# Patient Record
Sex: MALE | Race: WHITE | NOT HISPANIC OR LATINO | Employment: OTHER | URBAN - NONMETROPOLITAN AREA
[De-identification: names, ages, dates, MRNs, and addresses within clinical notes are randomized per-mention and may not be internally consistent; named-entity substitution may affect disease eponyms.]

---

## 2017-01-03 ENCOUNTER — DOCUMENTATION (OUTPATIENT)
Dept: NEUROSURGERY | Facility: CLINIC | Age: 47
End: 2017-01-03

## 2017-01-03 NOTE — PROGRESS NOTES
Mailed office note to Evelin Leung APRN  [address] [facility]  [id number]

## 2017-05-26 ENCOUNTER — OFFICE VISIT (OUTPATIENT)
Dept: NEUROLOGY | Age: 47
End: 2017-05-26
Payer: MEDICAID

## 2017-05-26 VITALS
DIASTOLIC BLOOD PRESSURE: 78 MMHG | OXYGEN SATURATION: 94 % | SYSTOLIC BLOOD PRESSURE: 133 MMHG | BODY MASS INDEX: 42.66 KG/M2 | WEIGHT: 315 LBS | HEIGHT: 72 IN | HEART RATE: 81 BPM

## 2017-05-26 DIAGNOSIS — G47.33 OBSTRUCTIVE SLEEP APNEA: Primary | ICD-10-CM

## 2017-05-26 DIAGNOSIS — Z99.89 CPAP (CONTINUOUS POSITIVE AIRWAY PRESSURE) DEPENDENCE: ICD-10-CM

## 2017-05-26 PROCEDURE — 99213 OFFICE O/P EST LOW 20 MIN: CPT | Performed by: PHYSICIAN ASSISTANT

## 2017-05-26 RX ORDER — SERTRALINE HYDROCHLORIDE 100 MG/1
TABLET, FILM COATED ORAL
COMMUNITY
Start: 2017-05-20

## 2017-08-24 ENCOUNTER — OFFICE VISIT (OUTPATIENT)
Dept: NEUROLOGY | Age: 47
End: 2017-08-24
Payer: MEDICAID

## 2017-08-24 VITALS
RESPIRATION RATE: 20 BRPM | HEART RATE: 85 BPM | SYSTOLIC BLOOD PRESSURE: 135 MMHG | DIASTOLIC BLOOD PRESSURE: 88 MMHG | HEIGHT: 72 IN | WEIGHT: 315 LBS | BODY MASS INDEX: 42.66 KG/M2

## 2017-08-24 DIAGNOSIS — M54.42 CHRONIC BILATERAL LOW BACK PAIN WITH BILATERAL SCIATICA: ICD-10-CM

## 2017-08-24 DIAGNOSIS — M54.41 CHRONIC BILATERAL LOW BACK PAIN WITH BILATERAL SCIATICA: ICD-10-CM

## 2017-08-24 DIAGNOSIS — G89.29 CHRONIC BILATERAL LOW BACK PAIN WITH BILATERAL SCIATICA: ICD-10-CM

## 2017-08-24 DIAGNOSIS — M25.50 ARTHRALGIA, UNSPECIFIED JOINT: ICD-10-CM

## 2017-08-24 DIAGNOSIS — R20.1 HYPOESTHESIA OF SKIN: ICD-10-CM

## 2017-08-24 DIAGNOSIS — G47.33 OBSTRUCTIVE SLEEP APNEA: Primary | ICD-10-CM

## 2017-08-24 DIAGNOSIS — M54.2 CERVICALGIA: ICD-10-CM

## 2017-08-24 LAB
ALBUMIN SERPL-MCNC: 4.2 G/DL (ref 3.5–5.2)
ALP BLD-CCNC: 76 U/L (ref 40–130)
ALT SERPL-CCNC: 26 U/L (ref 5–41)
ANION GAP SERPL CALCULATED.3IONS-SCNC: 13 MMOL/L (ref 7–19)
AST SERPL-CCNC: 21 U/L (ref 5–40)
BASOPHILS ABSOLUTE: 0 K/UL (ref 0–0.2)
BASOPHILS RELATIVE PERCENT: 0.3 % (ref 0–1)
BILIRUB SERPL-MCNC: <0.2 MG/DL (ref 0.2–1.2)
BUN BLDV-MCNC: 14 MG/DL (ref 6–20)
CALCIUM SERPL-MCNC: 9.2 MG/DL (ref 8.6–10)
CHLORIDE BLD-SCNC: 100 MMOL/L (ref 98–111)
CO2: 28 MMOL/L (ref 22–29)
CREAT SERPL-MCNC: 0.6 MG/DL (ref 0.5–1.2)
EOSINOPHILS ABSOLUTE: 0.2 K/UL (ref 0–0.6)
EOSINOPHILS RELATIVE PERCENT: 1.8 % (ref 0–5)
FOLATE: 8.2 NG/ML (ref 4.5–32.2)
GFR NON-AFRICAN AMERICAN: >60
GLUCOSE BLD-MCNC: 98 MG/DL (ref 74–109)
HCT VFR BLD CALC: 43.4 % (ref 42–52)
HEMOGLOBIN: 13.8 G/DL (ref 14–18)
LYMPHOCYTES ABSOLUTE: 2.3 K/UL (ref 1.1–4.5)
LYMPHOCYTES RELATIVE PERCENT: 22.9 % (ref 20–40)
MCH RBC QN AUTO: 28.1 PG (ref 27–31)
MCHC RBC AUTO-ENTMCNC: 31.8 G/DL (ref 33–37)
MCV RBC AUTO: 88.4 FL (ref 80–94)
MONOCYTES ABSOLUTE: 0.6 K/UL (ref 0–0.9)
MONOCYTES RELATIVE PERCENT: 5.7 % (ref 0–10)
NEUTROPHILS ABSOLUTE: 7 K/UL (ref 1.5–7.5)
NEUTROPHILS RELATIVE PERCENT: 68.9 % (ref 50–65)
PDW BLD-RTO: 15.5 % (ref 11.5–14.5)
PLATELET # BLD: 250 K/UL (ref 130–400)
PMV BLD AUTO: 10.3 FL (ref 9.4–12.4)
POTASSIUM SERPL-SCNC: 4.1 MMOL/L (ref 3.5–5)
RBC # BLD: 4.91 M/UL (ref 4.7–6.1)
RHEUMATOID FACTOR: <10 IU/ML
SEDIMENTATION RATE, ERYTHROCYTE: 28 MM/HR (ref 0–10)
SODIUM BLD-SCNC: 141 MMOL/L (ref 136–145)
TOTAL PROTEIN: 7.8 G/DL (ref 6.6–8.7)
TSH SERPL DL<=0.05 MIU/L-ACNC: 2.47 UIU/ML (ref 0.27–4.2)
VITAMIN B-12: 453 PG/ML (ref 211–946)
WBC # BLD: 10.2 K/UL (ref 4.8–10.8)

## 2017-08-24 PROCEDURE — 99214 OFFICE O/P EST MOD 30 MIN: CPT | Performed by: PSYCHIATRY & NEUROLOGY

## 2017-08-24 RX ORDER — ALBUTEROL SULFATE 90 UG/1
1-2 AEROSOL, METERED RESPIRATORY (INHALATION)
COMMUNITY

## 2017-08-24 RX ORDER — OXYCODONE AND ACETAMINOPHEN 7.5; 325 MG/1; MG/1
1 TABLET ORAL
COMMUNITY
Start: 2016-10-16 | End: 2018-05-15

## 2017-08-24 RX ORDER — MONTELUKAST SODIUM 10 MG/1
TABLET ORAL
COMMUNITY
Start: 2016-11-16

## 2017-08-24 RX ORDER — METAXALONE 800 MG/1
800 TABLET ORAL
COMMUNITY
Start: 2016-10-16 | End: 2018-05-15

## 2017-08-24 RX ORDER — ALBUTEROL SULFATE 2.5 MG/3ML
SOLUTION RESPIRATORY (INHALATION)
COMMUNITY
Start: 2017-08-21

## 2017-08-24 RX ORDER — AMITRIPTYLINE HYDROCHLORIDE 25 MG/1
25 TABLET, FILM COATED ORAL NIGHTLY
Qty: 30 TABLET | Refills: 2 | Status: SHIPPED | OUTPATIENT
Start: 2017-08-24 | End: 2017-11-30

## 2017-08-26 LAB
ANA IGG, ELISA: DETECTED
LYME, EIA: 0.33 LIV (ref 0–1.2)

## 2017-08-27 LAB — ANA BY IFA: NORMAL

## 2017-09-01 ENCOUNTER — HOSPITAL ENCOUNTER (OUTPATIENT)
Dept: NEUROLOGY | Age: 47
Discharge: HOME | End: 2017-09-01
Payer: MEDICAID

## 2017-09-01 DIAGNOSIS — G89.29 CHRONIC BILATERAL LOW BACK PAIN WITH BILATERAL SCIATICA: ICD-10-CM

## 2017-09-01 DIAGNOSIS — M25.50 ARTHRALGIA, UNSPECIFIED JOINT: ICD-10-CM

## 2017-09-01 DIAGNOSIS — R20.1 HYPOESTHESIA OF SKIN: ICD-10-CM

## 2017-09-01 DIAGNOSIS — M54.2 CERVICALGIA: ICD-10-CM

## 2017-09-01 DIAGNOSIS — M54.41 CHRONIC BILATERAL LOW BACK PAIN WITH BILATERAL SCIATICA: ICD-10-CM

## 2017-09-01 DIAGNOSIS — M54.42 CHRONIC BILATERAL LOW BACK PAIN WITH BILATERAL SCIATICA: ICD-10-CM

## 2017-09-01 PROCEDURE — 95886 MUSC TEST DONE W/N TEST COMP: CPT

## 2017-09-01 PROCEDURE — 95911 NRV CNDJ TEST 9-10 STUDIES: CPT | Performed by: PSYCHIATRY & NEUROLOGY

## 2017-09-01 PROCEDURE — 95886 MUSC TEST DONE W/N TEST COMP: CPT | Performed by: PSYCHIATRY & NEUROLOGY

## 2017-09-01 PROCEDURE — 95911 NRV CNDJ TEST 9-10 STUDIES: CPT

## 2017-11-30 ENCOUNTER — OFFICE VISIT (OUTPATIENT)
Dept: NEUROLOGY | Age: 47
End: 2017-11-30
Payer: MEDICAID

## 2017-11-30 VITALS
DIASTOLIC BLOOD PRESSURE: 82 MMHG | HEART RATE: 80 BPM | BODY MASS INDEX: 42.66 KG/M2 | RESPIRATION RATE: 22 BRPM | SYSTOLIC BLOOD PRESSURE: 128 MMHG | WEIGHT: 315 LBS | HEIGHT: 72 IN

## 2017-11-30 DIAGNOSIS — M25.50 ARTHRALGIA, UNSPECIFIED JOINT: ICD-10-CM

## 2017-11-30 DIAGNOSIS — M47.812 SPONDYLOSIS OF CERVICAL REGION WITHOUT MYELOPATHY OR RADICULOPATHY: Primary | ICD-10-CM

## 2017-11-30 DIAGNOSIS — G56.03 BILATERAL CARPAL TUNNEL SYNDROME: ICD-10-CM

## 2017-11-30 DIAGNOSIS — G47.33 OBSTRUCTIVE SLEEP APNEA: ICD-10-CM

## 2017-11-30 PROCEDURE — 1036F TOBACCO NON-USER: CPT | Performed by: PSYCHIATRY & NEUROLOGY

## 2017-11-30 PROCEDURE — 99213 OFFICE O/P EST LOW 20 MIN: CPT | Performed by: PSYCHIATRY & NEUROLOGY

## 2017-11-30 PROCEDURE — G8484 FLU IMMUNIZE NO ADMIN: HCPCS | Performed by: PSYCHIATRY & NEUROLOGY

## 2017-11-30 PROCEDURE — G8427 DOCREV CUR MEDS BY ELIG CLIN: HCPCS | Performed by: PSYCHIATRY & NEUROLOGY

## 2017-11-30 PROCEDURE — G8417 CALC BMI ABV UP PARAM F/U: HCPCS | Performed by: PSYCHIATRY & NEUROLOGY

## 2017-11-30 RX ORDER — RANITIDINE 300 MG/1
TABLET ORAL
COMMUNITY
Start: 2017-10-11 | End: 2020-01-02 | Stop reason: ALTCHOICE

## 2017-11-30 RX ORDER — NORTRIPTYLINE HYDROCHLORIDE 10 MG/1
10 CAPSULE ORAL
COMMUNITY
End: 2018-05-15

## 2017-11-30 RX ORDER — SUCRALFATE 1 G/10ML
SUSPENSION ORAL
COMMUNITY
Start: 2017-10-11 | End: 2022-02-08

## 2017-11-30 NOTE — PATIENT INSTRUCTIONS
INSTRUCTIONS:  1. Will refer you to an orthopedic surgeon regarding the carpal tunnel syndrome  2. Will have you see Dr. Leia Amaya for a rheumatology evaluation  3.  Continue the CPAP use and same medications

## 2017-12-01 ENCOUNTER — TELEPHONE (OUTPATIENT)
Dept: NEUROLOGY | Age: 47
End: 2017-12-01

## 2017-12-01 NOTE — TELEPHONE ENCOUNTER
Called patient was called and given information that referral must come from PCP due to insurance being [employer]ons.  Patient voiced understanding information

## 2017-12-26 ENCOUNTER — APPOINTMENT (OUTPATIENT)
Dept: PREADMISSION TESTING | Facility: HOSPITAL | Age: 47
End: 2017-12-26

## 2017-12-26 VITALS
HEART RATE: 79 BPM | BODY MASS INDEX: 41.75 KG/M2 | HEIGHT: 73 IN | SYSTOLIC BLOOD PRESSURE: 153 MMHG | WEIGHT: 315 LBS | OXYGEN SATURATION: 97 % | DIASTOLIC BLOOD PRESSURE: 91 MMHG | RESPIRATION RATE: 16 BRPM

## 2017-12-26 LAB
ANION GAP SERPL CALCULATED.3IONS-SCNC: 10 MMOL/L (ref 4–13)
BUN BLD-MCNC: 16 MG/DL (ref 5–21)
BUN/CREAT SERPL: 20.5 (ref 7–25)
CALCIUM SPEC-SCNC: 9.3 MG/DL (ref 8.4–10.4)
CHLORIDE SERPL-SCNC: 100 MMOL/L (ref 98–110)
CO2 SERPL-SCNC: 31 MMOL/L (ref 24–31)
CREAT BLD-MCNC: 0.78 MG/DL (ref 0.5–1.4)
DEPRECATED RDW RBC AUTO: 46.5 FL (ref 40–54)
ERYTHROCYTE [DISTWIDTH] IN BLOOD BY AUTOMATED COUNT: 15.6 % (ref 12–15)
GFR SERPL CREATININE-BSD FRML MDRD: 107 ML/MIN/1.73
GLUCOSE BLD-MCNC: 93 MG/DL (ref 70–100)
HCT VFR BLD AUTO: 41 % (ref 40–52)
HGB BLD-MCNC: 13.8 G/DL (ref 14–18)
MCH RBC QN AUTO: 27.8 PG (ref 28–32)
MCHC RBC AUTO-ENTMCNC: 33.7 G/DL (ref 33–36)
MCV RBC AUTO: 82.7 FL (ref 82–95)
PLATELET # BLD AUTO: 245 10*3/MM3 (ref 130–400)
PMV BLD AUTO: 10.3 FL (ref 6–12)
POTASSIUM BLD-SCNC: 3.9 MMOL/L (ref 3.5–5.3)
RBC # BLD AUTO: 4.96 10*6/MM3 (ref 4.8–5.9)
SODIUM BLD-SCNC: 141 MMOL/L (ref 135–145)
WBC NRBC COR # BLD: 12.32 10*3/MM3 (ref 4.8–10.8)

## 2017-12-26 PROCEDURE — 93005 ELECTROCARDIOGRAM TRACING: CPT

## 2017-12-26 PROCEDURE — 93010 ELECTROCARDIOGRAM REPORT: CPT | Performed by: INTERNAL MEDICINE

## 2017-12-26 PROCEDURE — 85027 COMPLETE CBC AUTOMATED: CPT | Performed by: ORTHOPAEDIC SURGERY

## 2017-12-26 PROCEDURE — 80048 BASIC METABOLIC PNL TOTAL CA: CPT | Performed by: ORTHOPAEDIC SURGERY

## 2017-12-26 PROCEDURE — 36415 COLL VENOUS BLD VENIPUNCTURE: CPT

## 2017-12-26 RX ORDER — RANITIDINE 300 MG/1
300 TABLET ORAL NIGHTLY
COMMUNITY
End: 2021-03-24

## 2017-12-26 RX ORDER — SUCRALFATE 1 G/1
1 TABLET ORAL 4 TIMES DAILY
COMMUNITY

## 2017-12-26 RX ORDER — METOCLOPRAMIDE 10 MG/1
10 TABLET ORAL 3 TIMES DAILY PRN
COMMUNITY

## 2017-12-26 RX ORDER — NORTRIPTYLINE HYDROCHLORIDE 10 MG/1
10 CAPSULE ORAL NIGHTLY
COMMUNITY
End: 2021-01-12 | Stop reason: ALTCHOICE

## 2017-12-26 RX ORDER — FLUTICASONE PROPIONATE 50 MCG
1 SPRAY, SUSPENSION (ML) NASAL DAILY
Status: ON HOLD | COMMUNITY
End: 2018-01-02

## 2017-12-26 NOTE — DISCHARGE INSTRUCTIONS
DAY OF SURGERY INSTRUCTIONS        YOUR SURGEON: DR STEPHON KIRKPATRICK    PROCEDURE: RIGHT CARPAL TUNNEL RELEASE    DATE OF SURGERY: January 2, 2018    ARRIVAL TIME: AS DIRECTED BY OFFICE    DAY OF SURGERY TAKE ONLY THESE MEDICATIONS: NONE        BEFORE YOU COME TO THE HOSPITAL  (Pre-op instructions)  • Do not eat, drink, smoke or chew gum after midnight the night before surgery.  This also includes no mints.  • Morning of surgery take only the medicines you have been instructed with a sip of water unless otherwise instructed  by your physician.  • Do not shave, wear makeup or dark nail polish.  • Remove all jewelry including rings.  • Leave anything you consider valuable at home.  • Leave your suitcase in the car until after your surgery.  • Bring the following with you if applicable:  o Picture ID and insurance, Medicare or Medicaid cards  o Co-pay/deductible required by insurance (cash, check, credit card)  o Copy of advance directive, living will or power-of- documents if not brought to PAT  o CPAP or BIPAP mask and tubing  o Relaxation aids (MP3 player, book, magazine)  • On the day of surgery check in at registration located at the main entrance of the hospital.       Outpatient Surgery Guidelines, Adult  Outpatient procedures are those for which the person having the procedure is allowed to go home the same day as the procedure. Various procedures are done on an outpatient basis. You should follow some general guidelines if you will be having an outpatient procedure.  LET YOUR HEALTH CARE PROVIDER KNOW ABOUT:  · Any allergies you have.  · All medicines you are taking, including vitamins, herbs, eye drops, creams, and over-the-counter medicines.  · Previous problems you or members of your family have had with the use of anesthetics.  · Any blood disorders you have.  · Previous surgeries you have had.  · Medical conditions you have.  RISKS AND COMPLICATIONS  Your health care provider will discuss possible  risks and complications with you before surgery. Common risks and complications include:    · Problems due to the use of anesthetics.  · Blood loss and replacement (does not apply to minor surgical procedures).  · Temporary increase in pain due to surgery.  · Uncorrected pain or problems that the surgery was meant to correct.  · Infection.  · New damage.  BEFORE THE PROCEDURE  · Ask your health care provider about changing or stopping your regular medicines. You may need to stop taking certain medicines in the days or weeks before the procedure.  · Stop smoking at least 2 weeks before surgery. This lowers your risk for complications during and after surgery. Ask your health care provider for help with this if needed.  · Eat your usual meals and a light supper the day before surgery. Continue fluid intake. Do not drink alcohol.  · Do not eat or drink after midnight the night before your surgery.   · Arrange for someone to take you home and to stay with you for 24 hours after the procedure. Medicine given for your procedure may affect your ability to drive or to care for yourself.  · Call your health care provider's office if you develop an illness or problem that may prevent you from safely having your procedure.  AFTER THE PROCEDURE  After surgery, you will be taken to a recovery area, where your progress will be monitored. If there are no complications, you will be allowed to go home when you are awake, stable, and taking fluids well. You may have numbness around the surgical site. Healing will take some time. You will have tenderness at the surgical site and may have some swelling and bruising. You may also have some nausea.  HOME CARE INSTRUCTIONS  · Do not drive for 24 hours, or as directed by your health care provider. Do not drive while taking prescription pain medicines.  · Do not drink alcohol for 24 hours.  · Do not make important decisions or sign legal documents for 24 hours.  · You may resume a normal  diet and activities as directed.  · Do not lift anything heavier than 10 pounds (4.5 kg) or play contact sports until your health care provider says it is okay.  · Change your bandages (dressings) as directed.  · Only take over-the-counter or prescription medicines as directed by your health care provider.  · Follow up with your health care provider as directed.  SEEK MEDICAL CARE IF:  · You have increased bleeding (more than a small spot) from the surgical site.  · You have redness, swelling, or increasing pain in the wound.  · You see pus coming from the wound.  · You have a fever.  · You notice a bad smell coming from the wound or dressing.  · You feel lightheaded or faint.  · You develop a rash.  · You have trouble breathing.  · You develop allergies.  MAKE SURE YOU:  · Understand these instructions.  · Will watch your condition.  · Will get help right away if you are not doing well or get worse.     This information is not intended to replace advice given to you by your health care provider. Make sure you discuss any questions you have with your health care provider.     Document Released: 09/12/2002 Document Revised: 05/03/2016 Document Reviewed: 05/22/2014  [employer] Interactive Patient Education ©2016 [employer]       Fall Prevention in Hospitals, Adult  As a hospital patient, your condition and the treatments you receive can increase your risk for falls. Some additional risk factors for falls in a hospital include:  · Being in an unfamiliar environment.  · Being on bed rest.  · Your surgery.  · Taking certain medicines.  · Your tubing requirements, such as intravenous (IV) therapy or catheters.  It is important that you learn how to decrease fall risks while at the hospital. Below are important tips that can help prevent falls.  SAFETY TIPS FOR PREVENTING FALLS  Talk about your risk of falling.  · Ask your health care provider why you are at risk for falling. Is it your medicine, illness, tubing  placement, or something else?  · Make a plan with your health care provider to keep you safe from falls.  · Ask your health care provider or pharmacist about side effects of your medicines. Some medicines can make you dizzy or affect your coordination.  Ask for help.  · Ask for help before getting out of bed. You may need to press your call button.  · Ask for assistance in getting safely to the toilet.  · Ask for a walker or cane to be put at your bedside. Ask that most of the side rails on your bed be placed up before your health care provider leaves the room.  · Ask family or friends to sit with you.  · Ask for things that are out of your reach, such as your glasses, hearing aids, telephone, bedside table, or call button.  Follow these tips to avoid falling:  · Stay lying or seated, rather than standing, while waiting for help.  · Wear rubber-soled slippers or shoes whenever you walk in the hospital.  · Avoid quick, sudden movements.  ¨ Change positions slowly.  ¨ Sit on the side of your bed before standing.  ¨ Stand up slowly and wait before you start to walk.  · Let your health care provider know if there is a spill on the floor.  · Pay careful attention to the medical equipment, electrical cords, and tubes around you.  · When you need help, use your call button by your bed or in the bathroom. Wait for one of your health care providers to help you.  · If you feel dizzy or unsure of your footing, return to bed and wait for assistance.  · Avoid being distracted by the TV, telephone, or another person in your room.  · Do not lean or support yourself on rolling objects, such as IV poles or bedside tables.     This information is not intended to replace advice given to you by your health care provider. Make sure you discuss any questions you have with your health care provider.     Document Released: 12/15/2001 Document Revised: 01/08/2016 Document Reviewed: 08/25/2013  [employer] Interactive Patient Education ©2016  [employer]       Surgical Site Infections FAQs  What is a Surgical Site Infection (SSI)?  A surgical site infection is an infection that occurs after surgery in the part of the body where the surgery took place. Most patients who have surgery do not develop an infection. However, infections develop in about 1 to 3 out of every 100 patients who have surgery.  Some of the common symptoms of a surgical site infection are:  · Redness and pain around the area where you had surgery  · Drainage of cloudy fluid from your surgical wound  · Fever  Can SSIs be treated?  Yes. Most surgical site infections can be treated with antibiotics. The antibiotic given to you depends on the bacteria (germs) causing the infection. Sometimes patients with SSIs also need another surgery to treat the infection.  What are some of the things that hospitals are doing to prevent SSIs?  To prevent SSIs, doctors, nurses, and other healthcare providers:  · Clean their hands and arms up to their elbows with an antiseptic agent just before the surgery.  · Clean their hands with soap and water or an alcohol-based hand rub before and after caring for each patient.  · May remove some of your hair immediately before your surgery using electric clippers if the hair is in the same area where the procedure will occur. They should not shave you with a razor.  · Wear special hair covers, masks, gowns, and gloves during surgery to keep the surgery area clean.  · Give you antibiotics before your surgery starts. In most cases, you should get antibiotics within 60 minutes before the surgery starts and the antibiotics should be stopped within 24 hours after surgery.  · Clean the skin at the site of your surgery with a special soap that kills germs.  What can I do to help prevent SSIs?  Before your surgery:  · Tell your doctor about other medical problems you may have. Health problems such as allergies, diabetes, and obesity could affect your surgery and your  treatment.  · Quit smoking. Patients who smoke get more infections. Talk to your doctor about how you can quit before your surgery.  · Do not shave near where you will have surgery. Shaving with a razor can irritate your skin and make it easier to develop an infection.  At the time of your surgery:  · Speak up if someone tries to shave you with a razor before surgery. Ask why you need to be shaved and talk with your surgeon if you have any concerns.  · Ask if you will get antibiotics before surgery.  After your surgery:  · Make sure that your healthcare providers clean their hands before examining you, either with soap and water or an alcohol-based hand rub.  · If you do not see your providers clean their hands, please ask them to do so.  · Family and friends who visit you should not touch the surgical wound or dressings.  · Family and friends should clean their hands with soap and water or an alcohol-based hand rub before and after visiting you. If you do not see them clean their hands, ask them to clean their hands.  What do I need to do when I go home from the hospital?  · Before you go home, your doctor or nurse should explain everything you need to know about taking care of your wound. Make sure you understand how to care for your wound before you leave the hospital.  · Always clean your hands before and after caring for your wound.  · Before you go home, make sure you know who to contact if you have questions or problems after you get home.  · If you have any symptoms of an infection, such as redness and pain at the surgery site, drainage, or fever, call your doctor immediately.  If you have additional questions, please ask your doctor or nurse.  Developed and co-sponsored by The Society for Healthcare Epidemiology of [place] (SHEA); Infectious Diseases Society of [place] (IDSA); American Hospital Association; Association for Professionals in Infection Control and Epidemiology (APIC); Centers for Disease  Control and Prevention (CDC); and The Joint Commission.     This information is not intended to replace advice given to you by your health care provider. Make sure you discuss any questions you have with your health care provider.     Document Released: 12/23/2014 Document Revised: 01/08/2016 Document Reviewed: 03/02/2016  [employer] Interactive Patient Education ©2016 [employer] Inc.       [facility]  CHG 4% Patient Instruction Sheet    Preparing the Skin Before Surgery  Preparing or “prepping” skin before surgery can reduce the risk of infection at the surgical site. To make the process easier,[facility] has chosen 4% Chlorhexidine Gluconate (CHG) antiseptic solution.   The steps below outline the prepping process and should be carefully followed.                                                                                                                                                      Prep the skin at the following time(s):                                                      We recommend you shower the night before surgery, and again the morning of surgery with the 4% CHG antiseptic solution using half of the bottle and a cloth each time.  Dress in clean clothes/sleepwear after showering.  See instructions below for application.          Do not apply any lotions or moisturizers.       Do not shave the area to be prepped for at least 2 days prior to surgery.    Clipping the hair may be done immediately prior to your surgery at the hospital    if needed.    Directions:  Thoroughly rinse your body with water.  Apply 4% CHG to a cloth and wash skin gently, paying special attention to the operative site.  Rinse again thoroughly.  Once you have begun using this product do not apply anything else to your skin. If itching or redness persists, rinse affected areas and discontinue use.    When using this product:  • Keep out of eyes, ears, and mouth.  • If solution should contact these areas, rinse out promptly  and thoroughly with water.  • For external use only.  • Do not use in genital area, on your face or head.      PATIENT/FAMILY/RESPONSIBLE PARTY VERBALIZES UNDERSTANDING OF ABOVE EDUCATION.  COPY OF PAIN SCALE GIVEN AND REVIEWED WITH VERBALIZED UNDERSTANDING.

## 2017-12-31 ENCOUNTER — ANESTHESIA EVENT (OUTPATIENT)
Dept: PERIOP | Facility: HOSPITAL | Age: 47
End: 2017-12-31

## 2018-01-02 ENCOUNTER — ANESTHESIA (OUTPATIENT)
Dept: PERIOP | Facility: HOSPITAL | Age: 48
End: 2018-01-02

## 2018-01-02 ENCOUNTER — HOSPITAL ENCOUNTER (OUTPATIENT)
Facility: HOSPITAL | Age: 48
Setting detail: HOSPITAL OUTPATIENT SURGERY
Discharge: HOME | End: 2018-01-02
Attending: ORTHOPAEDIC SURGERY | Admitting: ORTHOPAEDIC SURGERY

## 2018-01-02 VITALS
OXYGEN SATURATION: 96 % | TEMPERATURE: 98.3 F | DIASTOLIC BLOOD PRESSURE: 74 MMHG | SYSTOLIC BLOOD PRESSURE: 149 MMHG | RESPIRATION RATE: 16 BRPM | HEART RATE: 81 BPM

## 2018-01-02 PROBLEM — G56.01 RIGHT CARPAL TUNNEL SYNDROME: Status: ACTIVE | Noted: 2018-01-02

## 2018-01-02 PROCEDURE — 25010000003 CEFAZOLIN PER 500 MG: Performed by: NURSE ANESTHETIST, CERTIFIED REGISTERED

## 2018-01-02 PROCEDURE — 25010000002 FENTANYL CITRATE (PF) 100 MCG/2ML SOLUTION: Performed by: NURSE ANESTHETIST, CERTIFIED REGISTERED

## 2018-01-02 PROCEDURE — 25010000002 PROPOFOL 10 MG/ML EMULSION: Performed by: NURSE ANESTHETIST, CERTIFIED REGISTERED

## 2018-01-02 RX ORDER — MIDAZOLAM HYDROCHLORIDE 1 MG/ML
2 INJECTION INTRAMUSCULAR; INTRAVENOUS
Status: DISCONTINUED | OUTPATIENT
Start: 2018-01-02 | End: 2018-01-02 | Stop reason: HOSPADM

## 2018-01-02 RX ORDER — ONDANSETRON 2 MG/ML
4 INJECTION INTRAMUSCULAR; INTRAVENOUS ONCE AS NEEDED
Status: DISCONTINUED | OUTPATIENT
Start: 2018-01-02 | End: 2018-01-02 | Stop reason: HOSPADM

## 2018-01-02 RX ORDER — SODIUM CHLORIDE, SODIUM LACTATE, POTASSIUM CHLORIDE, CALCIUM CHLORIDE 600; 310; 30; 20 MG/100ML; MG/100ML; MG/100ML; MG/100ML
100 INJECTION, SOLUTION INTRAVENOUS CONTINUOUS
Status: DISCONTINUED | OUTPATIENT
Start: 2018-01-02 | End: 2018-01-02 | Stop reason: HOSPADM

## 2018-01-02 RX ORDER — CEFAZOLIN SODIUM 1 G/3ML
INJECTION, POWDER, FOR SOLUTION INTRAMUSCULAR; INTRAVENOUS AS NEEDED
Status: DISCONTINUED | OUTPATIENT
Start: 2018-01-02 | End: 2018-01-02 | Stop reason: SURG

## 2018-01-02 RX ORDER — LABETALOL HYDROCHLORIDE 5 MG/ML
5 INJECTION, SOLUTION INTRAVENOUS
Status: DISCONTINUED | OUTPATIENT
Start: 2018-01-02 | End: 2018-01-02 | Stop reason: HOSPADM

## 2018-01-02 RX ORDER — ONDANSETRON 2 MG/ML
4 INJECTION INTRAMUSCULAR; INTRAVENOUS AS NEEDED
Status: DISCONTINUED | OUTPATIENT
Start: 2018-01-02 | End: 2018-01-02 | Stop reason: HOSPADM

## 2018-01-02 RX ORDER — HYDROCODONE BITARTRATE AND ACETAMINOPHEN 5; 325 MG/1; MG/1
1 TABLET ORAL ONCE AS NEEDED
Status: DISCONTINUED | OUTPATIENT
Start: 2018-01-02 | End: 2018-01-02 | Stop reason: HOSPADM

## 2018-01-02 RX ORDER — HYDRALAZINE HYDROCHLORIDE 20 MG/ML
5 INJECTION INTRAMUSCULAR; INTRAVENOUS
Status: DISCONTINUED | OUTPATIENT
Start: 2018-01-02 | End: 2018-01-02 | Stop reason: HOSPADM

## 2018-01-02 RX ORDER — SODIUM CHLORIDE 0.9 % (FLUSH) 0.9 %
3 SYRINGE (ML) INJECTION AS NEEDED
Status: DISCONTINUED | OUTPATIENT
Start: 2018-01-02 | End: 2018-01-02 | Stop reason: HOSPADM

## 2018-01-02 RX ORDER — SODIUM CHLORIDE, SODIUM LACTATE, POTASSIUM CHLORIDE, CALCIUM CHLORIDE 600; 310; 30; 20 MG/100ML; MG/100ML; MG/100ML; MG/100ML
1000 INJECTION, SOLUTION INTRAVENOUS CONTINUOUS
Status: DISCONTINUED | OUTPATIENT
Start: 2018-01-02 | End: 2018-01-02 | Stop reason: HOSPADM

## 2018-01-02 RX ORDER — FENTANYL CITRATE 50 UG/ML
INJECTION, SOLUTION INTRAMUSCULAR; INTRAVENOUS AS NEEDED
Status: DISCONTINUED | OUTPATIENT
Start: 2018-01-02 | End: 2018-01-02 | Stop reason: SURG

## 2018-01-02 RX ORDER — PROPOFOL 10 MG/ML
VIAL (ML) INTRAVENOUS AS NEEDED
Status: DISCONTINUED | OUTPATIENT
Start: 2018-01-02 | End: 2018-01-02 | Stop reason: SURG

## 2018-01-02 RX ORDER — IPRATROPIUM BROMIDE AND ALBUTEROL SULFATE 2.5; .5 MG/3ML; MG/3ML
3 SOLUTION RESPIRATORY (INHALATION) ONCE AS NEEDED
Status: DISCONTINUED | OUTPATIENT
Start: 2018-01-02 | End: 2018-01-02 | Stop reason: HOSPADM

## 2018-01-02 RX ORDER — MAGNESIUM HYDROXIDE 1200 MG/15ML
LIQUID ORAL AS NEEDED
Status: DISCONTINUED | OUTPATIENT
Start: 2018-01-02 | End: 2018-01-02 | Stop reason: HOSPADM

## 2018-01-02 RX ORDER — MORPHINE SULFATE 2 MG/ML
2 INJECTION, SOLUTION INTRAMUSCULAR; INTRAVENOUS AS NEEDED
Status: DISCONTINUED | OUTPATIENT
Start: 2018-01-02 | End: 2018-01-02 | Stop reason: HOSPADM

## 2018-01-02 RX ORDER — SODIUM CHLORIDE 0.9 % (FLUSH) 0.9 %
1-10 SYRINGE (ML) INJECTION AS NEEDED
Status: DISCONTINUED | OUTPATIENT
Start: 2018-01-02 | End: 2018-01-02 | Stop reason: HOSPADM

## 2018-01-02 RX ORDER — MIDAZOLAM HYDROCHLORIDE 1 MG/ML
1 INJECTION INTRAMUSCULAR; INTRAVENOUS
Status: DISCONTINUED | OUTPATIENT
Start: 2018-01-02 | End: 2018-01-02 | Stop reason: HOSPADM

## 2018-01-02 RX ORDER — MEPERIDINE HYDROCHLORIDE 25 MG/ML
12.5 INJECTION INTRAMUSCULAR; INTRAVENOUS; SUBCUTANEOUS
Status: DISCONTINUED | OUTPATIENT
Start: 2018-01-02 | End: 2018-01-02 | Stop reason: HOSPADM

## 2018-01-02 RX ORDER — LIDOCAINE HYDROCHLORIDE 20 MG/ML
INJECTION, SOLUTION INFILTRATION; PERINEURAL AS NEEDED
Status: DISCONTINUED | OUTPATIENT
Start: 2018-01-02 | End: 2018-01-02 | Stop reason: SURG

## 2018-01-02 RX ORDER — HYDROCODONE BITARTRATE AND ACETAMINOPHEN 5; 325 MG/1; MG/1
TABLET ORAL
Qty: 20 TABLET | Refills: 0 | Status: SHIPPED | OUTPATIENT
Start: 2018-01-02 | End: 2021-01-12 | Stop reason: ALTCHOICE

## 2018-01-02 RX ORDER — FLUMAZENIL 0.1 MG/ML
0.2 INJECTION INTRAVENOUS AS NEEDED
Status: DISCONTINUED | OUTPATIENT
Start: 2018-01-02 | End: 2018-01-02 | Stop reason: HOSPADM

## 2018-01-02 RX ORDER — METOCLOPRAMIDE HYDROCHLORIDE 5 MG/ML
5 INJECTION INTRAMUSCULAR; INTRAVENOUS
Status: DISCONTINUED | OUTPATIENT
Start: 2018-01-02 | End: 2018-01-02 | Stop reason: HOSPADM

## 2018-01-02 RX ORDER — NALOXONE HCL 0.4 MG/ML
0.04 VIAL (ML) INJECTION AS NEEDED
Status: DISCONTINUED | OUTPATIENT
Start: 2018-01-02 | End: 2018-01-02 | Stop reason: HOSPADM

## 2018-01-02 RX ADMIN — CEFAZOLIN 2 G: 1 INJECTION, POWDER, FOR SOLUTION INTRAVENOUS at 09:14

## 2018-01-02 RX ADMIN — PROPOFOL 400 MG: 10 INJECTION, EMULSION INTRAVENOUS at 09:18

## 2018-01-02 RX ADMIN — LIDOCAINE HYDROCHLORIDE 0.5 ML: 10 INJECTION, SOLUTION EPIDURAL; INFILTRATION; INTRACAUDAL; PERINEURAL at 08:39

## 2018-01-02 RX ADMIN — SODIUM CHLORIDE, POTASSIUM CHLORIDE, SODIUM LACTATE AND CALCIUM CHLORIDE 1000 ML: 600; 310; 30; 20 INJECTION, SOLUTION INTRAVENOUS at 08:39

## 2018-01-02 RX ADMIN — LIDOCAINE HYDROCHLORIDE 100 MG: 20 INJECTION, SOLUTION INFILTRATION; PERINEURAL at 09:18

## 2018-01-02 RX ADMIN — FENTANYL CITRATE 100 MCG: 50 INJECTION, SOLUTION INTRAMUSCULAR; INTRAVENOUS at 09:18

## 2018-01-02 NOTE — PLAN OF CARE
Problem: Patient Care Overview (Adult)  Goal: Plan of Care Review  Outcome: Outcome(s) achieved Date Met: 01/02/18 01/02/18 1055   Coping/Psychosocial Response Interventions   Plan Of Care Reviewed With patient;adams   Patient Care Overview   Progress improving   Outcome Evaluation   Outcome Summary/Follow up Plan Patient meets discharge criteria and is ready for discharge.     Goal: Adult Individualization and Mutuality  Outcome: Outcome(s) achieved Date Met: 01/02/18    Goal: Discharge Needs Assessment  Outcome: Outcome(s) achieved Date Met: 01/02/18      Problem: Perioperative Period (Adult)  Goal: Signs and Symptoms of Listed Potential Problems Will be Absent or Manageable (Perioperative Period)  Outcome: Outcome(s) achieved Date Met: 01/02/18

## 2018-01-02 NOTE — ANESTHESIA POSTPROCEDURE EVALUATION
Patient: Jose Luis Ziegler    Procedure Summary     Date Anesthesia Start Anesthesia Stop Room / Location    01/02/18 0914 0943  [facility] OR 06 / [facility]       Procedure Diagnosis Surgeon Provider    RIGHT CARPAL TUNNEL RELEASE (Right Wrist) Right carpal tunnel syndrome  (RIGHT CARPAL TUNNEL SYNDROME) MD Jonathan Toth CRNA          Anesthesia Type: general  Last vitals  BP   149/74 (01/02/18 1045)   Temp   98.3 °F (36.8 °C) (01/02/18 0956)   Pulse   81 (01/02/18 1045)   Resp   16 (01/02/18 1045)     SpO2   96 % (01/02/18 1045)     Post Anesthesia Care and Evaluation    Patient location during evaluation: PACU  Patient participation: complete - patient participated  Level of consciousness: awake and alert  Pain management: adequate  Airway patency: patent  Anesthetic complications: No anesthetic complications  PONV Status: controlled  Cardiovascular status: acceptable and hemodynamically stable  Respiratory status: acceptable  Hydration status: acceptable    Comments: Patient discharged from PACU prior to anesthesia evaluation based on Bella Score.  For details, see RN note.     /74 (BP Location: Left arm, Patient Position: Lying)  Pulse 81  Temp 98.3 °F (36.8 °C) (Temporal Artery )   Resp 16  SpO2 96%

## 2018-01-02 NOTE — DISCHARGE INSTRUCTIONS
General Anesthesia, Adult, Care After  Refer to this sheet in the next few weeks. These instructions provide you with information on caring for yourself after your procedure. Your health care provider may also give you more specific instructions. Your treatment has been planned according to current medical practices, but problems sometimes occur. Call your health care provider if you have any problems or questions after your procedure.  WHAT TO EXPECT AFTER THE PROCEDURE  After the procedure, it is typical to experience:  · Sleepiness.  · Nausea and vomiting.  HOME CARE INSTRUCTIONS  · For the first 24 hours after general anesthesia:  ¨ Have a responsible person with you.  ¨ Do not drive a car. If you are alone, do not take public transportation.  ¨ Do not drink alcohol.  ¨ Do not take medicine that has not been prescribed by your health care provider.  ¨ Do not sign important papers or make important decisions.  ¨ You may resume a normal diet and activities as directed by your health care provider.  · Change bandages (dressings) as directed.  · If you have questions or problems that seem related to general anesthesia, call the hospital and ask for the anesthetist or anesthesiologist on call.  SEEK MEDICAL CARE IF:  · You have nausea and vomiting that continue the day after anesthesia.  · You develop a rash.  SEEK IMMEDIATE MEDICAL CARE IF:    · You have difficulty breathing.  · You have chest pain.  · You have any allergic problems.     This information is not intended to replace advice given to you by your health care provider. Make sure you discuss any questions you have with your health care provider.     Document Released: 03/26/2002 Document Revised: 01/08/2016 Document Reviewed: 07/03/2014  [email or web address] Interactive Patient Education ©2016 [email or web address]    CALL YOUR PHYSICIAN IF YOU EXPERIENCE  INCREASED PAIN NOT HELPED BY YOUR PAIN MEDICATION.    .                                              Fall Prevention in  the Home      Falls can cause injuries. They can happen to people of all ages. There are many things you can do to make your home safe and to help prevent falls.    WHAT CAN I DO ON THE OUTSIDE OF MY HOME?  · Regularly fix the edges of walkways and driveways and fix any cracks.  · Remove anything that might make you trip as you walk through a door, such as a raised step or threshold.  · Trim any bushes or trees on the path to your home.  · Use bright outdoor lighting.  · Clear any walking paths of anything that might make someone trip, such as rocks or tools.  · Regularly check to see if handrails are loose or broken. Make sure that both sides of any steps have handrails.  · Any raised decks and porches should have guardrails on the edges.  · Have any leaves, snow, or ice cleared regularly.  · Use sand or salt on walking paths during winter.  · Clean up any spills in your garage right away. This includes oil or grease spills.  WHAT CAN I DO IN THE BATHROOM?    · Use night lights.  · Install grab bars by the toilet and in the tub and shower. Do not use towel bars as grab bars.  · Use non-skid mats or decals in the tub or shower.  · If you need to sit down in the shower, use a plastic, non-slip stool.  · Keep the floor dry. Clean up any water that spills on the floor as soon as it happens.  · Remove soap buildup in the tub or shower regularly.  · Attach bath mats securely with double-sided non-slip rug tape.  · Do not have throw rugs and other things on the floor that can make you trip.  WHAT CAN I DO IN THE BEDROOM?  · Use night lights.  · Make sure that you have a light by your bed that is easy to reach.  · Do not use any sheets or blankets that are too big for your bed. They should not hang down onto the floor.  · Have a firm chair that has side arms. You can use this for support while you get dressed.  · Do not have throw rugs and other things on the floor that can make you trip.  WHAT CAN I DO IN THE  KITCHEN?  · Clean up any spills right away.  · Avoid walking on wet floors.  · Keep items that you use a lot in easy-to-reach places.  · If you need to reach something above you, use a strong step stool that has a grab bar.  · Keep electrical cords out of the way.  · Do not use floor polish or wax that makes floors slippery. If you must use wax, use non-skid floor wax.  · Do not have throw rugs and other things on the floor that can make you trip.  WHAT CAN I DO WITH MY STAIRS?  · Do not leave any items on the stairs.  · Make sure that there are handrails on both sides of the stairs and use them. Fix handrails that are broken or loose. Make sure that handrails are as long as the stairways.  · Check any carpeting to make sure that it is firmly attached to the stairs. Fix any carpet that is loose or worn.  · Avoid having throw rugs at the top or bottom of the stairs. If you do have throw rugs, attach them to the floor with carpet tape.  · Make sure that you have a light switch at the top of the stairs and the bottom of the stairs. If you do not have them, ask someone to add them for you.  WHAT ELSE CAN I DO TO HELP PREVENT FALLS?  · Wear shoes that:  ¨ Do not have high heels.  ¨ Have rubber bottoms.  ¨ Are comfortable and fit you well.  ¨ Are closed at the toe. Do not wear sandals.  · If you use a stepladder:  ¨ Make sure that it is fully opened. Do not climb a closed stepladder.  ¨ Make sure that both sides of the stepladder are locked into place.  ¨ Ask someone to hold it for you, if possible.  · Clearly leland and make sure that you can see:  ¨ Any grab bars or handrails.  ¨ First and last steps.  ¨ Where the edge of each step is.  · Use tools that help you move around (mobility aids) if they are needed. These include:  ¨ Canes.  ¨ Walkers.  ¨ Scooters.  ¨ Crutches.  · Turn on the lights when you go into a dark area. Replace any light bulbs as soon as they burn out.  · Set up your furniture so you have a clear path.  Avoid moving your furniture around.  · If any of your floors are uneven, fix them.  · If there are any pets around you, be aware of where they are.  · Review your medicines with your doctor. Some medicines can make you feel dizzy. This can increase your chance of falling.  Ask your doctor what other things that you can do to help prevent falls.     This information is not intended to replace advice given to you by your health care provider. Make sure you discuss any questions you have with your health care provider.     Document Released: 10/14/2010 Document Revised: 05/03/2016 Document Reviewed: 01/22/2016  [employer] Interactive Patient Education ©2016 [employer]     PATIENT/FAMILY/RESPONSIBLE PARTY VERBALIZES UNDERSTANDING OF ABOVE EDUCATION.  COPY OF PAIN SCALE GIVEN AND REVIEWED WITH VERBALIZED UNDERSTANDING.                              UPPER EXTREMITY POST-OP INSTRUCTIONS - DR. KIRKPATRICK    IMPORTANT PHONE NUMBERS:  • For emergencies, please call [phone]  • You may reach Dr. Kirkpatrick and clinical staff at [phone]- M-K 8:00 am-5:00 pm  • After 5pm or on the weekends, please call [phone]  • Call immediately if you have any of the following symptoms:     Elevated temperature above 101.5 degrees for more than 48 hours after surgery     Persistent drainage from wound     Severe pain around surgical site    Sling use: The sling is provided for your comfort and to ensure proper healing of your repair following surgery. Please place the abduction pillow with the curved side against your side and the sling on the side of the pillow. Your surgery requires that you wear the sling if noted below.  ____ For comfort. Remove sling 24 hours and begin range of motion exercises  ____ At all times except bathing, dressing, and therapy. Also wear the sling during sleep.  ____ No sling required    Bathing:  ___No bandages, no restrictions!!  ___You may remove you dressing and shower on the 3rd day after surgery (Ex. [place]  surgery, shower on Friday)  ** if you are told to it is ok to remove your dressing and shower, DO NOT SOAK your incisions in a tub.  ___Keep splint clean, dry, and intact. DO NOT place foreign objects into your splint.      Dressings: Keep dressing/splint intact unless instructed otherwise below. SOME DRAINAGE IS NORMAL!    • DO NOT touch or apply ointment to the incision.    • DO NOT remove the steri-strips over the incisions (if you have steri-strips). They will         generally fall off on their own or can be removed 1 weeksafter surgery.    • If you have yellow gauze and it comes off, do not worry about it. Leave them off.   • Signs of infection that warrant a phone call to our clinical line:     o Excessive drainage or redness     o Red streaking coming away from the incision  o Increased pain  o Increased temperature above 101 degrees      Physical Therapy:        *  Your physical therapy status will be discussed with you postoperatively and at your first post-op appointment. Some injuries will not require physical therapy.      *  If you have a shoulder manipulation, please schedule therapy for the next day      Medications: You will be discharged with the appropriate medications following your surgery. Fill these at the pharmacy and take them as directed on the label. Not all of the medications below may be prescribed. Occasionally, other medications may be prescribed with specific instructions.    Percocet/Lortab (oxycodone/hydrocodone with tylenol) - Pain Medication, will cause drowsiness, possibly itchiness (this is NOT an allergy - use benadryl or an over the counter allergy medication such as Claritin or Zyrtec)     o Take 1-2 tablets every 4-6 hours. DO NOT EXCEED 4,000mg of Tylenol in 24 hours.  **DO NOT MIX WITH ALCOHOL, DRIVE WHILE TAKING, OR TAKE with extra TYLENOL**    Colace (Docusate) - stool softener, used for constipation. Take this only if you feel constipated.      Zofran (Ondansetron) or  Phenergan - Anti-nausea medication, will cause drowsiness      **If you are running low on pain medications, please notify us if you need a refill 24-48 hours prior to when you run out, so we can make arrangements to refill the prescription for you if we determine is necessary

## 2018-01-02 NOTE — H&P
Pt Name: Jose Luis Ziegler  MRN: [id number]  YOB: 1970  Date of evaluation: 1/2/2018    H&P including current review of systems was updated in the paper chart and/or the document previously scanned into the record.  There have been no significant changes or new problems since the original evaluation.  The patient's problems continue and indications for contemplated procedure have not changed.    Electronically signed by Derrell Pritchard MD on 1/2/2018 at 8:59 AM

## 2018-01-02 NOTE — OP NOTE
Patient Name: Natalie  : 1970  MRN: [id number]      DATE of SURGERY: 2018    SURGEON: Derrell Pritchard MD    ASSISTANT: NONE    PREOPERATIVE DIAGNOSIS    Right carpal tunnel syndrome.       POSTOPERATIVE DIAGNOSIS    Right carpal tunnel syndrome.       PROCEDURE PERFORMED    Right carpal tunnel release.       IMPLANTS  None.       ANESTHESIA    General endotracheal.       OPERATIVE INDICATIONS    The patient is a 47 y.o. male who presented to my clinic with complaints of numbness and tingling in the hand with clinical exam and neurodiagnostic evidence of carpal tunnel syndrome.  The patient wished to proceed with surgery, understanding the risks, benefits, and alternatives.  Conservative treatment failed to improve symptoms.  The risks include, but are not limited to, that of anesthesia, bleeding, infection, pain, damage to the motor and sensory branch of the median nerve, chronic pillar pain, incomplete resolution of symptoms.       ESTIMATED BLOOD LOSS    Less than 5 mL.       SPECIMENS    None.       DRAINS  None.       COMPLICATIONS    None.       PROCEDURE IN DETAIL    The patient was seen in the preoperative holding room; once again, the informed consent form was reviewed with the patient and signed. The site of surgery was marked with the patients agreement. After being transferred to the operating room, a timeout was performed identifying the correct patient as well as the operative site. Perioperative antibiotics were administered. The tourniquet was then placed on the brachium of the operative extremity. A sterile prep and drape was performed.       A longitudinal incision was made at the base of the palm in line with the radial border of the ring finger intersecting Kaplans cardinal line. Soft tissue was dissected in line with the incision through the palmar fascia. The transverse carpal ligament was identified and beginning distally, while protecting the superficial radial arch of  vessels, it was incised. A Cottonwood elevator was inserted into the carpal tunnel protecting the underlying median nerve and transection of the ligament was performed proximally. The motor branch of the median nerve was identified and protected. Finally, a pair of blunt-tipped scissors was inserted with the points directed toward the ulnar aspect of the wrist to protect the palmar cutaneous branch of the median nerve and the distal forearm fascia was incised as well.       A complete release of the transverse carpal ligament was performed, verified visually as well as with palpation. The floor of the carpal tunnel was swept with a Cottonwood identifying no masses. The incision was irrigated and the skin closed with simple interrupted 3-0 nylon sutures. Total tourniquet time was less than 10 minutes. A soft dressing was placed; the patient was awakened from anesthesia and transported to the recovery room in stable condition.       POSTOPERATIVE PLAN  Discharge home, return to clinic in 2 weeks for suture removal and activity as tolerated. No lifting heavier than a full coffee cup for 2 weeks.     Electronically signed by Derrell Pritchard MD on 1/2/2018 at 9:33 AM

## 2018-01-02 NOTE — PLAN OF CARE
Problem: Perioperative Period (Adult)  Goal: Signs and Symptoms of Listed Potential Problems Will be Absent or Manageable (Perioperative Period)  Outcome: Ongoing (interventions implemented as appropriate)   01/02/18 0900   Perioperative Period   Problems Assessed (Perioperative Period) pain;hypoxia/hypoxemia;hypothermia;situational response;perioperative injury   Problems Present (Perioperative Period) situational response

## 2018-01-02 NOTE — PLAN OF CARE
Problem: Patient Care Overview (Adult)  Goal: Plan of Care Review  Outcome: Ongoing (interventions implemented as appropriate)   01/02/18 0955   Coping/Psychosocial Response Interventions   Plan Of Care Reviewed With patient   Patient Care Overview   Progress improving   Outcome Evaluation   Outcome Summary/Follow up Plan d/c criteria met       Problem: Perioperative Period (Adult)  Goal: Signs and Symptoms of Listed Potential Problems Will be Absent or Manageable (Perioperative Period)  Outcome: Ongoing (interventions implemented as appropriate)

## 2018-01-02 NOTE — BRIEF OP NOTE
CARPAL TUNNEL RELEASE  Progress Note    Jose Luis Ziegler  1/2/2018    Pre-op Diagnosis:   RIGHT CARPAL TUNNEL SYNDROME       Post-Op Diagnosis Codes:     * Right carpal tunnel syndrome [G56.01]    Procedure/CPT® Codes:  CA REVISE MEDIAN N/CARPAL TUNNEL SURG [67366]    Procedure(s):  RIGHT CARPAL TUNNEL RELEASE    Surgeon(s):  Derrell Pritchard MD    Anesthesia: General with Block    Staff:   Circulator: Gael Paredes RN  Scrub Person: Jonathan Ziegler    Estimated Blood Loss: minimal    Urine Voided: * No values recorded between 1/2/2018 12:00 AM and 1/2/2018  8:59 AM *    Specimens:                None      Drains:           Findings: see op note    Complications: none      Derrell Pritchard MD     Date: 1/2/2018  Time: 8:59 AM

## 2018-01-02 NOTE — ANESTHESIA PREPROCEDURE EVALUATION
Anesthesia Evaluation     Patient summary reviewed and Nursing notes reviewed   no history of anesthetic complications:  NPO Solid Status: > 8 hours  NPO Liquid Status: > 8 hours     Airway   Mallampati: III  possible difficult intubation  Dental - normal exam     Pulmonary    (+) asthma, sleep apnea,     ROS comment: Took inhaler at 0630  Cardiovascular   Exercise tolerance: good (4-7 METS)    (+) hypertension poorly controlled,       Neuro/Psych  (+) weakness, numbness, psychiatric history Anxiety,     GI/Hepatic/Renal/Endo    (+) obesity, morbid obesity, PUD,     Musculoskeletal     Abdominal    Substance History - negative use     OB/GYN negative ob/gyn ROS         Other   (+) arthritis                                             Anesthesia Plan    ASA 3     general     intravenous induction   Anesthetic plan and risks discussed with patient.

## 2018-01-02 NOTE — ANESTHESIA PROCEDURE NOTES
Airway  Urgency: elective    Airway not difficult    General Information and Staff    Patient location during procedure: OR  CRNA: DOMINICK ORTIZ    Indications and Patient Condition  Indications for airway management: airway protection    Preoxygenated: yes  MILS maintained throughout  Mask difficulty assessment: 1 - vent by mask    Final Airway Details  Final airway type: supraglottic airway      Successful airway: I-gel  Size 5    Number of attempts at approach: 1

## 2018-01-02 NOTE — PLAN OF CARE
Problem: Patient Care Overview (Adult)  Goal: Plan of Care Review  Outcome: Ongoing (interventions implemented as appropriate)   01/02/18 0919   Coping/Psychosocial Response Interventions   Plan Of Care Reviewed With patient   Patient Care Overview   Progress no change   Outcome Evaluation   Outcome Summary/Follow up Plan no changes in the preop holding phase

## 2018-02-15 ENCOUNTER — TELEPHONE (OUTPATIENT)
Dept: NEUROSURGERY | Age: 48
End: 2018-02-15

## 2018-02-15 NOTE — TELEPHONE ENCOUNTER
Left a message that he can come by office and sign a records release to get copy of labs or can sign a record release at Dr Sheila Samuels and then we call fax there.

## 2018-03-07 ENCOUNTER — TRANSCRIBE ORDERS (OUTPATIENT)
Dept: ADMINISTRATIVE | Facility: HOSPITAL | Age: 48
End: 2018-03-07

## 2018-03-07 DIAGNOSIS — G56.93 BILATERAL NEUROPATHY OF UPPER EXTREMITIES: ICD-10-CM

## 2018-03-07 DIAGNOSIS — M54.50 LUMBAR SPINE PAIN: Primary | ICD-10-CM

## 2018-03-07 DIAGNOSIS — G57.93 NEUROPATHY INVOLVING BOTH LOWER EXTREMITIES: ICD-10-CM

## 2018-03-12 ENCOUNTER — HOSPITAL ENCOUNTER (OUTPATIENT)
Dept: MRI IMAGING | Facility: HOSPITAL | Age: 48
Discharge: HOME | End: 2018-03-12
Admitting: NURSE PRACTITIONER

## 2018-03-12 DIAGNOSIS — G57.93 NEUROPATHY INVOLVING BOTH LOWER EXTREMITIES: ICD-10-CM

## 2018-03-12 DIAGNOSIS — M54.50 LUMBAR SPINE PAIN: ICD-10-CM

## 2018-03-12 PROCEDURE — 72148 MRI LUMBAR SPINE W/O DYE: CPT

## 2018-03-16 PROCEDURE — 88304 TISSUE EXAM BY PATHOLOGIST: CPT | Performed by: SURGERY

## 2018-03-19 ENCOUNTER — LAB REQUISITION (OUTPATIENT)
Dept: LAB | Facility: HOSPITAL | Age: 48
End: 2018-03-19

## 2018-03-19 DIAGNOSIS — Z00.00 ENCOUNTER FOR GENERAL ADULT MEDICAL EXAMINATION WITHOUT ABNORMAL FINDINGS: ICD-10-CM

## 2018-03-20 LAB
CYTO UR: NORMAL
LAB AP CASE REPORT: NORMAL
LAB AP CLINICAL INFORMATION: NORMAL
Lab: NORMAL
PATH REPORT.FINAL DX SPEC: NORMAL
PATH REPORT.GROSS SPEC: NORMAL

## 2018-04-02 ENCOUNTER — TELEPHONE (OUTPATIENT)
Dept: NEUROLOGY | Age: 48
End: 2018-04-02

## 2018-05-15 ENCOUNTER — OFFICE VISIT (OUTPATIENT)
Dept: NEUROLOGY | Age: 48
End: 2018-05-15
Payer: MEDICAID

## 2018-05-15 VITALS
HEART RATE: 75 BPM | WEIGHT: 315 LBS | DIASTOLIC BLOOD PRESSURE: 84 MMHG | HEIGHT: 72 IN | BODY MASS INDEX: 42.66 KG/M2 | SYSTOLIC BLOOD PRESSURE: 143 MMHG

## 2018-05-15 DIAGNOSIS — M54.42 CHRONIC BILATERAL LOW BACK PAIN WITH BILATERAL SCIATICA: ICD-10-CM

## 2018-05-15 DIAGNOSIS — G56.21 ULNAR NEUROPATHY OF RIGHT UPPER EXTREMITY: ICD-10-CM

## 2018-05-15 DIAGNOSIS — M54.2 CERVICALGIA: ICD-10-CM

## 2018-05-15 DIAGNOSIS — G47.33 OBSTRUCTIVE SLEEP APNEA: Primary | ICD-10-CM

## 2018-05-15 DIAGNOSIS — G56.03 BILATERAL CARPAL TUNNEL SYNDROME: ICD-10-CM

## 2018-05-15 DIAGNOSIS — M54.41 CHRONIC BILATERAL LOW BACK PAIN WITH BILATERAL SCIATICA: ICD-10-CM

## 2018-05-15 DIAGNOSIS — G89.29 CHRONIC BILATERAL LOW BACK PAIN WITH BILATERAL SCIATICA: ICD-10-CM

## 2018-05-15 DIAGNOSIS — M47.812 SPONDYLOSIS OF CERVICAL REGION WITHOUT MYELOPATHY OR RADICULOPATHY: ICD-10-CM

## 2018-05-15 PROCEDURE — 99213 OFFICE O/P EST LOW 20 MIN: CPT | Performed by: PSYCHIATRY & NEUROLOGY

## 2018-05-15 PROCEDURE — 1036F TOBACCO NON-USER: CPT | Performed by: PSYCHIATRY & NEUROLOGY

## 2018-05-15 PROCEDURE — G8417 CALC BMI ABV UP PARAM F/U: HCPCS | Performed by: PSYCHIATRY & NEUROLOGY

## 2018-05-15 PROCEDURE — G8427 DOCREV CUR MEDS BY ELIG CLIN: HCPCS | Performed by: PSYCHIATRY & NEUROLOGY

## 2018-05-15 RX ORDER — ACETAMINOPHEN, ASPIRIN AND CAFFEINE 250; 250; 65 MG/1; MG/1; MG/1
1 TABLET, FILM COATED ORAL PRN
COMMUNITY

## 2018-05-15 RX ORDER — AMITRIPTYLINE HYDROCHLORIDE 25 MG/1
25 TABLET, FILM COATED ORAL NIGHTLY
COMMUNITY
End: 2020-01-02 | Stop reason: ALTCHOICE

## 2018-05-15 RX ORDER — DICLOFENAC SODIUM AND MISOPROSTOL 75; 200 MG/1; UG/1
1 TABLET, DELAYED RELEASE ORAL 2 TIMES DAILY
COMMUNITY
End: 2020-01-02 | Stop reason: ALTCHOICE

## 2018-05-15 RX ORDER — METOCLOPRAMIDE 10 MG/1
10 TABLET ORAL 4 TIMES DAILY
COMMUNITY
End: 2022-02-08

## 2018-05-15 RX ORDER — CHOLECALCIFEROL (VITAMIN D3) 1250 MCG
10000 CAPSULE ORAL
COMMUNITY

## 2018-05-15 RX ORDER — FLUTICASONE PROPIONATE 50 MCG
1 SPRAY, SUSPENSION (ML) NASAL DAILY
COMMUNITY
End: 2020-01-02 | Stop reason: ALTCHOICE

## 2018-05-15 RX ORDER — FOLIC ACID 1 MG/1
1 TABLET ORAL DAILY
COMMUNITY

## 2018-12-07 PROCEDURE — 88304 TISSUE EXAM BY PATHOLOGIST: CPT | Performed by: SURGERY

## 2018-12-10 ENCOUNTER — LAB REQUISITION (OUTPATIENT)
Dept: LAB | Facility: HOSPITAL | Age: 48
End: 2018-12-10

## 2018-12-10 DIAGNOSIS — Z00.00 ENCOUNTER FOR GENERAL ADULT MEDICAL EXAMINATION WITHOUT ABNORMAL FINDINGS: ICD-10-CM

## 2018-12-14 LAB
CYTO UR: NORMAL
LAB AP CASE REPORT: NORMAL
LAB AP CLINICAL INFORMATION: NORMAL
PATH REPORT.FINAL DX SPEC: NORMAL
PATH REPORT.GROSS SPEC: NORMAL

## 2019-06-03 ENCOUNTER — TELEPHONE (OUTPATIENT)
Dept: NEUROLOGY | Age: 49
End: 2019-06-03

## 2019-06-03 NOTE — TELEPHONE ENCOUNTER
Left a VM for pt to get a current dwnld report on his machine and bring with him to his appt. ..NL 06-03-19

## 2019-06-04 ENCOUNTER — OFFICE VISIT (OUTPATIENT)
Dept: NEUROLOGY | Age: 49
End: 2019-06-04
Payer: COMMERCIAL

## 2019-06-04 VITALS
HEIGHT: 72 IN | WEIGHT: 315 LBS | SYSTOLIC BLOOD PRESSURE: 141 MMHG | BODY MASS INDEX: 42.66 KG/M2 | HEART RATE: 80 BPM | DIASTOLIC BLOOD PRESSURE: 79 MMHG

## 2019-06-04 DIAGNOSIS — M54.2 CERVICALGIA: ICD-10-CM

## 2019-06-04 DIAGNOSIS — M47.22 OSTEOARTHRITIS OF SPINE WITH RADICULOPATHY, CERVICAL REGION: ICD-10-CM

## 2019-06-04 DIAGNOSIS — G47.33 OBSTRUCTIVE SLEEP APNEA: Primary | ICD-10-CM

## 2019-06-04 PROCEDURE — 99214 OFFICE O/P EST MOD 30 MIN: CPT | Performed by: PSYCHIATRY & NEUROLOGY

## 2019-06-04 RX ORDER — BUDESONIDE AND FORMOTEROL FUMARATE DIHYDRATE 160; 4.5 UG/1; UG/1
2 AEROSOL RESPIRATORY (INHALATION) 2 TIMES DAILY
COMMUNITY
End: 2021-06-08

## 2019-06-04 RX ORDER — LEVALBUTEROL TARTRATE 45 UG/1
1-2 AEROSOL, METERED ORAL EVERY 4 HOURS PRN
COMMUNITY
End: 2020-01-02 | Stop reason: ALTCHOICE

## 2019-06-04 RX ORDER — LAMOTRIGINE 25 MG/1
25 TABLET ORAL DAILY
COMMUNITY
End: 2022-02-08

## 2019-06-04 RX ORDER — CALCIUM CARBONATE 500(1250)
500 TABLET ORAL DAILY
COMMUNITY

## 2019-06-04 RX ORDER — LISINOPRIL 10 MG/1
10 TABLET ORAL DAILY
COMMUNITY

## 2019-06-04 RX ORDER — TIZANIDINE 4 MG/1
4 TABLET ORAL NIGHTLY
COMMUNITY

## 2019-06-04 NOTE — PATIENT INSTRUCTIONS
INSTRUCTIONS:  1. Increase the CPAP pressure to 18cm. At some point will need a sleep study and new machine possibly a BiPAP  2. Will arrange an MRI C spine  3. Will arrange physical therapy  4.  Call back after PT if that does not help

## 2019-06-04 NOTE — PROGRESS NOTES
Pneumonia 1981    Proteinuria     PTSD (post-traumatic stress disorder)     Umbilical hernia     Umbilical pain 0/53/2104    Unspecified sleep apnea     uses CPAP on room air       Past Surgical History:   Procedure Laterality Date    CARPAL TUNNEL RELEASE Left 2011    CHOLECYSTECTOMY  03/2018    DENTAL SURGERY  1986    HERNIA REPAIR      umbilical & supraumbilical hernia repair on 6/27/14    HIATAL HERNIA REPAIR      ROTATOR CUFF REPAIR Right 2008    VASECTOMY  2001       Recent Hospitalizations  · None    Significant Injuries  · None    Habits  Dair Luis reports that he quit smoking about 25 years ago. His smoking use included cigarettes. He has a 4.00 pack-year smoking history. He quit smokeless tobacco use about 10 years ago. His smokeless tobacco use included snuff. He reports that he drinks alcohol. He reports that he does not use drugs. Family History   Problem Relation Age of Onset    Diabetes Mother     High Blood Pressure Mother     Diabetes Father     Cancer Father         leukemia    Diabetes Brother         type 1       Social History  Chino Martell , lives in [place], [place], and is a student.     Medications:  Current Outpatient Medications   Medication Sig Dispense Refill    budesonide-formoterol (SYMBICORT) 160-4.5 MCG/ACT AERO Inhale 2 puffs into the lungs 2 times daily      levalbuterol (XOPENEX HFA) 45 MCG/ACT inhaler Inhale 1-2 puffs into the lungs every 4 hours as needed for Wheezing      calcium carbonate (OSCAL) 500 MG TABS tablet Take 500 mg by mouth daily      lisinopril (PRINIVIL;ZESTRIL) 10 MG tablet Take 10 mg by mouth daily      tiZANidine (ZANAFLEX) 4 MG tablet Take 4 mg by mouth nightly      lamoTRIgine (LAMICTAL) 25 MG tablet Take 25 mg by mouth daily      fluticasone (FLONASE) 50 MCG/ACT nasal spray 1 spray by Nasal route daily      Cholecalciferol (VITAMIN D3) 64537 units CAPS Take 10,000 Units by mouth       diclofenac-Misoprostol negative for - hematuria or urinary frequency/urgency  Musculoskeletal ROS: positive for - joint pain, joint stiffness   Neurological ROS: negative for - memory loss,  weakness  Positive for - numbness, tingling. Examination:  Vitals:  BP (!) 141/79   Pulse 80   Ht 6' (1.829 m)   Wt (!) 349 lb (158.3 kg)   BMI 47.33 kg/m²   General appearance:  His head is telted to the right and he appears in pain. HEENT:  Sclera clear, anicteric, Oropharynx clear, no lesions, Neck supple with midline trachea, Thyroid without masses and Trachea midline  Mallampati 3  Heart::  regular rate and rhythm, S1, S2 normal, no murmur, click, rub or gallop  Lungs:  clear to auscultation bilaterally  Extremities:  extremities normal, atraumatic, no cyanosis or edema  Neurologic:  Extraocular movements are intact without nystagmus.  Visual fields are full to confrontation.  Facial movements are symmetrical and normal.  Speech is precise.  Extremity strength is normal in both uppers and lowers.  Tinel's signs are positive at the wrists.  He has right thenar muscle atrophy.  Deep tendon reflexes are intact and symmetrical.  Rapid alternating movements are unimpaired.  Finger-to-nose testing is performed well, without dysmetria.  Gait is normal.    Pertinent Diagnostic Studies:  Sleep study from 2014. CPAP download shows that he is 100% compliant with CPAP use at 16cm with residual AHI of 0.6. Result Narrative     OM-CERVICAL SPINE WO CONTRAST- 6/16/2016 8-00 AM CDT    HISTORY- Neck pain, cervicalgia. FINDINGS- This exam of the cervical spine consists of sagittal and axial  T1 and T2-weighted images with a sagittal STIR sequence. The C2-3 level  is unremarkable. At C3-4, there is mild spondylotic change which produces mild flattening  of the cord and thecal sac. There appears to be mild bilateral foraminal  narrowing.     At C4-5, there is a left paracentral disc osteophyte complex which  produces a left paracentral concave deformity upon the cord and thecal  sac. There is mild spinal stenosis with the AP diameter of the canal  measuring 9 mm in the midline. There is mild bilateral foraminal  narrowing. At C5-6, there is mild spondylotic change which produces uniform  flattening of the cord and thecal sac. There is mild spinal stenosis  with the AP diameter of the canal measuring 9 mm in the midline. There  is bilateral foraminal narrowing. At C6-7, there is spondylotic change producing mild flattening of the  thecal sac, worse on the left. There appears to be mild left-sided  foraminal narrowing. The C7-T1 level is unremarkable. IMPRESSION-   Multilevel degenerative changes as described above with spinal stenosis  at C4-5 and C5-6.             Reading Radiologist- Lis Castillo       Releasing Radiologist- Lis Castillo       Released Date Time- 06/16/16 1134       Assessment:       ICD-10-CM    1. Obstructive sleep apnea G47.33    2. Osteoarthritis of spine with radiculopathy, cervical region M47.22    I discussed the diagnosis of obstructive sleep apnea with Erle Oar including the pathophysiology (namely the mechanism of breathing and obstruction of upper airway, interruptions of sleep, hypoxemia, hypercapnia, and results of repetitive sympathetic activation), risks, evaluation, and treatment options. I discussed the risks of driving when drowsy and advised that Erle Oar not drive when drowsy and avoid sedating medications and respiratory suppressants. He has had worsened neck pain, stiffness and arm pain and numbness. He had severe spondylosis in 2016 with some spinal cord impression then. I worry it has worsened and he may require surgery. Plan:   1. Increase the CPAP pressure to 18cm. At some point will need a sleep study and new machine possibly a BiPAP  2. Will arrange an MRI C spine  3. Will arrange physical therapy  4.  Call back after PT if that does not help    Electronically signed by Hoda Palomares MD on 6/4/2019

## 2019-06-13 ENCOUNTER — HOSPITAL ENCOUNTER (OUTPATIENT)
Dept: MRI IMAGING | Age: 49
Discharge: HOME | End: 2019-06-13
Payer: COMMERCIAL

## 2019-06-13 DIAGNOSIS — M47.22 OSTEOARTHRITIS OF SPINE WITH RADICULOPATHY, CERVICAL REGION: ICD-10-CM

## 2019-06-13 DIAGNOSIS — M54.2 CERVICALGIA: ICD-10-CM

## 2019-06-13 PROCEDURE — 6360000004 HC RX CONTRAST MEDICATION: Performed by: PSYCHIATRY & NEUROLOGY

## 2019-06-13 PROCEDURE — 72156 MRI NECK SPINE W/O & W/DYE: CPT

## 2019-06-13 PROCEDURE — A9577 INJ MULTIHANCE: HCPCS | Performed by: PSYCHIATRY & NEUROLOGY

## 2019-06-13 RX ADMIN — GADOBENATE DIMEGLUMINE 20 ML: 529 INJECTION, SOLUTION INTRAVENOUS at 08:28

## 2019-06-14 ENCOUNTER — TELEPHONE (OUTPATIENT)
Dept: NEUROLOGY | Age: 49
End: 2019-06-14

## 2019-06-14 NOTE — TELEPHONE ENCOUNTER
----- Message from Kavita Dean MD sent at 6/13/2019 11:48 PM CDT -----  MRI C spine looked about the same as in 2016.   He has spurs, disc bulges with some narrowing of the spinal canal.  If PT does not help, call back and will have him see a spine surgeon

## 2019-06-20 ENCOUNTER — TELEPHONE (OUTPATIENT)
Dept: NEUROLOGY | Age: 49
End: 2019-06-20

## 2019-06-20 NOTE — TELEPHONE ENCOUNTER
Kelvin Man called from [facility]    Number :[phone]  GVA:[phone]    Looking for Orders for the patient for Physical Therapy. Please call or Fax.

## 2019-08-12 ENCOUNTER — OFFICE VISIT (OUTPATIENT)
Dept: NEUROLOGY | Age: 49
End: 2019-08-12
Payer: COMMERCIAL

## 2019-08-12 VITALS
SYSTOLIC BLOOD PRESSURE: 131 MMHG | WEIGHT: 315 LBS | DIASTOLIC BLOOD PRESSURE: 73 MMHG | RESPIRATION RATE: 18 BRPM | BODY MASS INDEX: 42.66 KG/M2 | HEART RATE: 81 BPM | HEIGHT: 72 IN

## 2019-08-12 DIAGNOSIS — G47.33 OBSTRUCTIVE SLEEP APNEA: ICD-10-CM

## 2019-08-12 DIAGNOSIS — M54.2 CERVICALGIA: ICD-10-CM

## 2019-08-12 DIAGNOSIS — M54.42 CHRONIC BILATERAL LOW BACK PAIN WITH BILATERAL SCIATICA: ICD-10-CM

## 2019-08-12 DIAGNOSIS — M47.22 OSTEOARTHRITIS OF SPINE WITH RADICULOPATHY, CERVICAL REGION: Primary | ICD-10-CM

## 2019-08-12 DIAGNOSIS — M54.41 CHRONIC BILATERAL LOW BACK PAIN WITH BILATERAL SCIATICA: ICD-10-CM

## 2019-08-12 DIAGNOSIS — G89.29 CHRONIC BILATERAL LOW BACK PAIN WITH BILATERAL SCIATICA: ICD-10-CM

## 2019-08-12 PROCEDURE — 99213 OFFICE O/P EST LOW 20 MIN: CPT | Performed by: PSYCHIATRY & NEUROLOGY

## 2019-08-12 NOTE — PROGRESS NOTES
in pain. HEENT:  Sclera clear, anicteric, Oropharynx clear, no lesions, Neck supple with midline trachea, Thyroid without masses and Trachea midline  Mallampati 3  Heart::  regular rate and rhythm, S1, S2 normal, no murmur, click, rub or gallop  Lungs:  clear to auscultation bilaterally  Extremities:  extremities normal, atraumatic, no cyanosis or edema  Neurologic:  Extraocular movements are intact without nystagmus.  Visual fields are full to confrontation.  Facial movements are symmetrical and normal.  Speech is precise.  Extremity strength is normal in both uppers and lowers.  Tinel's signs are positive at the wrists.  He has right thenar muscle atrophy.  Deep tendon reflexes are intact and symmetrical.  Rapid alternating movements are unimpaired.  Finger-to-nose testing is performed well, without dysmetria.  Gait is normal.    Pertinent Diagnostic Studies:  Narrative   EXAMINATION:  MRI CERVICAL SPINE W WO CONTRAST  6/13/2019 8:30 AM   HISTORY: Osteoarthritis of cervical spine with radiculopathy   COMPARISON: No comparison study. TECHNIQUE: MR evaluation of the cervical spine was performed. Gadolinium enhanced imaging obtained. FINDINGS:    There is no cord signal abnormalities. There is T2 signal hyperintensity can compatible with type I Modic   endplate changes at O4-C4. No additional marrow signal abnormalities   observed. Particularly there is no MR evidence of acute posttraumatic   change. There is no pathologic marrow replacement process. There is no abnormal gadolinium enhancement. DISC SPACE LEVELS:   C2-C3: There is mild narrowing of the disc space anteriorly. Minimal   central disc protrusion suspected. Bony spinal canal is capacious   without canal stenosis. The foramina are widely patent. C3-C4: There is mild disc space narrowing. There is broad-based   posterior osteophyte and disc complex extending into the foramina with   uncinate hypertrophy.  There is mild intradural thecal sac

## 2019-08-28 ENCOUNTER — TRANSCRIBE ORDERS (OUTPATIENT)
Dept: ADMINISTRATIVE | Facility: HOSPITAL | Age: 49
End: 2019-08-28

## 2019-08-28 DIAGNOSIS — M25.512 CHRONIC LEFT SHOULDER PAIN: Primary | ICD-10-CM

## 2019-08-28 DIAGNOSIS — G89.29 CHRONIC LEFT SHOULDER PAIN: Primary | ICD-10-CM

## 2019-09-03 ENCOUNTER — HOSPITAL ENCOUNTER (OUTPATIENT)
Dept: MRI IMAGING | Facility: HOSPITAL | Age: 49
Discharge: HOME | End: 2019-09-03
Admitting: NURSE PRACTITIONER

## 2019-09-03 PROCEDURE — 73221 MRI JOINT UPR EXTREM W/O DYE: CPT

## 2019-11-26 ENCOUNTER — TRANSCRIBE ORDERS (OUTPATIENT)
Dept: ADMINISTRATIVE | Facility: HOSPITAL | Age: 49
End: 2019-11-26

## 2019-11-26 DIAGNOSIS — K21.9 HIATAL HERNIA WITH GERD: Primary | ICD-10-CM

## 2019-11-26 DIAGNOSIS — K44.9 HIATAL HERNIA WITH GERD: Primary | ICD-10-CM

## 2019-12-02 ENCOUNTER — APPOINTMENT (OUTPATIENT)
Dept: GENERAL RADIOLOGY | Facility: HOSPITAL | Age: 49
End: 2019-12-02

## 2019-12-11 ENCOUNTER — TELEPHONE (OUTPATIENT)
Dept: NEUROLOGY | Age: 49
End: 2019-12-11

## 2020-01-02 ENCOUNTER — OFFICE VISIT (OUTPATIENT)
Dept: NEUROLOGY | Age: 50
End: 2020-01-02
Payer: COMMERCIAL

## 2020-01-02 VITALS
HEIGHT: 72 IN | OXYGEN SATURATION: 97 % | WEIGHT: 315 LBS | SYSTOLIC BLOOD PRESSURE: 126 MMHG | HEART RATE: 93 BPM | DIASTOLIC BLOOD PRESSURE: 80 MMHG | BODY MASS INDEX: 42.66 KG/M2

## 2020-01-02 PROCEDURE — G8484 FLU IMMUNIZE NO ADMIN: HCPCS | Performed by: PSYCHIATRY & NEUROLOGY

## 2020-01-02 PROCEDURE — G8427 DOCREV CUR MEDS BY ELIG CLIN: HCPCS | Performed by: PSYCHIATRY & NEUROLOGY

## 2020-01-02 PROCEDURE — G8417 CALC BMI ABV UP PARAM F/U: HCPCS | Performed by: PSYCHIATRY & NEUROLOGY

## 2020-01-02 PROCEDURE — 99213 OFFICE O/P EST LOW 20 MIN: CPT | Performed by: PSYCHIATRY & NEUROLOGY

## 2020-01-02 PROCEDURE — 1036F TOBACCO NON-USER: CPT | Performed by: PSYCHIATRY & NEUROLOGY

## 2020-01-02 NOTE — PROGRESS NOTES
(PROVENTIL) (2.5 MG/3ML) 0.083% nebulizer solution       sertraline (ZOLOFT) 100 MG tablet       cetirizine (ZYRTEC) 10 MG tablet Take 10 mg by mouth daily      azelastine (ASTELIN) 137 MCG/SPRAY nasal spray 1-2 sprays by Nasal route daily. Use in each nostril as directed       No current facility-administered medications for this visit. Allergies: Allergies as of 01/02/2020 - Review Complete 01/02/2020   Allergen Reaction Noted    Beta adrenergic blockers  10/16/2016    Gabapentin      Ibuprofen Other (See Comments) 12/21/2016    Medrol [methylprednisolone]  06/10/2014    Proton pump inhibitors  12/26/2017       Review of Systems:  General ROS: negative for - chills or fever  Psychological ROS: negative for - anxiety or depression  ENT ROS: negative for - headaches or sinus pain  Hematological and Lymphatic ROS: negative for - bleeding problems, bruising or swollen lymph nodes  Respiratory ROS: negative for - cough, hemoptysis or shortness of breath  Cardiovascular ROS: negative for - chest pain or palpitations  Gastrointestinal ROS: negative for - blood in stools, constipation, diarrhea or nausea/vomiting  Genito-Urinary ROS: negative for - hematuria or urinary frequency/urgency  Musculoskeletal ROS: posiitve for - joint pain, joint stiffness   Neurological ROS: negative for - memory loss, weakness     Examination:  Vitals:  /80   Pulse 93   Ht 6' (1.829 m)   Wt (!) 358 lb (162.4 kg)   SpO2 97%   BMI 48.55 kg/m²   General appearance:  His head is telted to the right and he appears in pain.   HEENT:  Sclera clear, anicteric, Oropharynx clear, no lesions, Neck supple with midline trachea, Thyroid without masses and Trachea midline  Mallampati 3  Heart::  regular rate and rhythm, S1, S2 normal, no murmur, click, rub or gallop  Lungs:  clear to auscultation bilaterally  Extremities:  extremities normal, atraumatic, no cyanosis or edema  Neurologic:  Extraocular movements are intact without nystagmus.  Visual fields are full to confrontation.  Facial movements are symmetrical and normal.  Speech is precise.  Extremity strength is normal in both uppers and lowers.  Tinel's signs are positive at the Deyanira Horns has right thenar muscle atrophy.  Deep tendon reflexes are intact and symmetrical.  Rapid alternating movements are unimpaired.  Finger-to-nose testing is performed well, without dysmetria.  Gait is normal.    Pertinent Diagnostic Studies:  CPAP download shows that he is is 100% compliant with CPAP at 18cm with residual AHI of 0.6. Assessment:       ICD-10-CM    1. Obstructive sleep apnea G47.33    2. Osteoarthritis of spine with radiculopathy, cervical region M47.22    3. Bilateral carpal tunnel syndrome G56.03    4. Cubital tunnel syndrome, bilateral G56.23    He is objectively compliant with and clinically benefiting from CPAP use. He has known left CTS based on NCS/EMG from over 2 years ago. He also has known right cubital tunnel syndrome and now complains of left hand ulnar side numbness and tingling. Plan:   1. CPAP supplies ordered  2. NCS/EMG  3.  After that will refer back to Dr. Garcia Russian      Electronically signed by Cherelle Crawford MD on 1/2/2020

## 2020-01-10 ENCOUNTER — HOSPITAL ENCOUNTER (OUTPATIENT)
Dept: NEUROLOGY | Age: 50
Discharge: HOME | End: 2020-01-10
Payer: MEDICARE

## 2020-01-10 PROCEDURE — 95886 MUSC TEST DONE W/N TEST COMP: CPT

## 2020-01-10 PROCEDURE — 95911 NRV CNDJ TEST 9-10 STUDIES: CPT | Performed by: PSYCHIATRY & NEUROLOGY

## 2020-01-10 PROCEDURE — 95886 MUSC TEST DONE W/N TEST COMP: CPT | Performed by: PSYCHIATRY & NEUROLOGY

## 2020-01-10 PROCEDURE — 95911 NRV CNDJ TEST 9-10 STUDIES: CPT

## 2020-06-05 ENCOUNTER — TELEPHONE (OUTPATIENT)
Dept: NEUROLOGY | Age: 50
End: 2020-06-05

## 2020-06-05 NOTE — TELEPHONE ENCOUNTER
Called patient to see if patient would like to change his appointment to a  VV through his mychart with Dr. Luna Garcia. No answer. Left a VM regarding this information and to call our office back.

## 2020-06-08 ENCOUNTER — OFFICE VISIT (OUTPATIENT)
Dept: NEUROLOGY | Age: 50
End: 2020-06-08
Payer: MEDICARE

## 2020-06-08 VITALS
HEART RATE: 84 BPM | DIASTOLIC BLOOD PRESSURE: 77 MMHG | WEIGHT: 315 LBS | SYSTOLIC BLOOD PRESSURE: 128 MMHG | BODY MASS INDEX: 42.66 KG/M2 | HEIGHT: 72 IN | RESPIRATION RATE: 20 BRPM

## 2020-06-08 PROBLEM — G56.23 CUBITAL TUNNEL SYNDROME, BILATERAL: Status: ACTIVE | Noted: 2020-06-08

## 2020-06-08 PROCEDURE — 1036F TOBACCO NON-USER: CPT | Performed by: PSYCHIATRY & NEUROLOGY

## 2020-06-08 PROCEDURE — 3017F COLORECTAL CA SCREEN DOC REV: CPT | Performed by: PSYCHIATRY & NEUROLOGY

## 2020-06-08 PROCEDURE — G8417 CALC BMI ABV UP PARAM F/U: HCPCS | Performed by: PSYCHIATRY & NEUROLOGY

## 2020-06-08 PROCEDURE — G8427 DOCREV CUR MEDS BY ELIG CLIN: HCPCS | Performed by: PSYCHIATRY & NEUROLOGY

## 2020-06-08 PROCEDURE — 99213 OFFICE O/P EST LOW 20 MIN: CPT | Performed by: PSYCHIATRY & NEUROLOGY

## 2020-06-08 NOTE — PROGRESS NOTES
[facility]  [address] [place] [address] [address], [address]  [place]  Phone [phone]  Fax [phone]     [facility] Follow Up Encounter  20 1:51 PM CDT    Information:   Patient Name: Sydnie Duncan  :   1970  Age:   48 y.o. MRN:   [id number]  Account #:  [de-identified]  Today:  20    Provider: Fabian Raphael M.D. Chief Complaint:   Chief Complaint   Patient presents with    Follow-up    Sleep Apnea       Subjective: Sydnie Duncan is a 48 y.o. man  with a history of obstructive sleep apnea and cervical spondylosis who is following up. He continues to use his CPAP nightly. He rests well at night. He denies daytime drowsiness. Last visit, he was having worsening hand numbness. A NCS/EMG was performed that showed CTS. He was referred for CTR but did not proceed. He now says he is ready to have the [facility]. He still has aggravating hand numbness that interferes with daily activity.         Objective:     Past Medical History:  Past Medical History:   Diagnosis Date    Anemia     Ankle edema     Anxiety     Asthma     Autoimmune disorder ([facility] [address]     been screened but not positively diagnosed    CPAP (continuous positive airway pressure) dependence     12cm    Depression     Diverticulosis     left colon    Fatigue     Fatty liver     Headache(784.0)     cluster sinus    Hyperthyroidism     Obesity, Class III, BMI 40-49.9 (morbid obesity) (HCC)     Obsessive compulsive disorder     Obstructive sleep apnea     AHI:  19.4 per PSG,     Pneumonia 1981    Proteinuria     PTSD (post-traumatic stress disorder)     Umbilical hernia     Umbilical pain     Unspecified sleep apnea     uses CPAP on room air       Past Surgical History:   Procedure Laterality Date    CARPAL TUNNEL RELEASE Left 2011    CHOLECYSTECTOMY  2018    DENTAL SURGERY  1986    HERNIA REPAIR      umbilical & supraumbilical hernia repair on 14    HIATAL HERNIA REPAIR  ROTATOR CUFF REPAIR Right 2008    VASECTOMY  2001       Recent Hospitalizations  · None    Significant Injuries  · None    Habits  Anuradha Limon reports that he quit smoking about 26 years ago. His smoking use included cigarettes. He has a 4.00 pack-year smoking history. He quit smokeless tobacco use about 11 years ago. His smokeless tobacco use included snuff. He reports current alcohol use. He reports that he does not use drugs. Family History   Problem Relation Age of Onset    Diabetes Mother     High Blood Pressure Mother     Diabetes Father     Cancer Father         leukemia    Diabetes Brother         type 1       Social History  John Graham , lives in [place], [place], and is a student.     Medications:  Current Outpatient Medications   Medication Sig Dispense Refill    Fluticasone furoate-vilanterol (BREO ELLIPTA) 200-25 MCG/INH AEPB inhaler Breo Ellipta 200 mcg-25 mcg/dose powder for inhalation      calcium carbonate (OSCAL) 500 MG TABS tablet Take 500 mg by mouth daily      lisinopril (PRINIVIL;ZESTRIL) 10 MG tablet Take 10 mg by mouth daily      tiZANidine (ZANAFLEX) 4 MG tablet Take 4 mg by mouth nightly      lamoTRIgine (LAMICTAL) 25 MG tablet Take 25 mg by mouth daily      Cholecalciferol (VITAMIN D3) 75362 units CAPS Take 10,000 Units by mouth       Levothyroxine Sodium (SYNTHROID PO) Take 0.5 mcg by mouth       methotrexate (RHEUMATREX) 2.5 MG chemo tablet Take by mouth once a week Indications: 10 PO Q Week       metoclopramide (REGLAN) 10 MG tablet Take 10 mg by mouth 4 times daily      folic acid (FOLVITE) 1 MG tablet Take 1 mg by mouth daily      CARAFATE 1 GM/10ML suspension       albuterol sulfate  (90 Base) MCG/ACT inhaler Inhale 1-2 puffs into the lungs      montelukast (SINGULAIR) 10 MG tablet       albuterol (PROVENTIL) (2.5 MG/3ML) 0.083% nebulizer solution       sertraline (ZOLOFT) 100 MG tablet       cetirizine (ZYRTEC) 10 MG tablet Take Alert, well developed, well nourished, in no distress  HEENT:  normocephalic, atraumatic, sclera appear normal, no nasal abnormalities, no rhinorrhea, Ears appear normal, oral mucous membranes are moist without erythema, trachea midline, thyroid is normal, no lymphadenopathy or neck mass. Cardiovascular:  Regular rate and rhythm without murmer. No peripheral edema, No cyanosis or clubbing. No carotid bruits. Pulmonary:  Lungs are clear to auscultation. Breathing appears normal, good expansion, normal effort without use of accessory muscles  Musculoskeletal:  Joints are normal.  No splints, slings, or casts. Spine appears normal with normal posture and range of motion. Integument:  No rash, erythema, or pallor  Psychiatric:  Mood, affect, and behavior appear normal      NEUROLOGIC EXAMINATION:  Mental Status:  alert, oriented to person, place, and time. Speech:  Clear without dysarthria or dysphonia  Language:  Fluent without aphasia  Cranial Nerves:   II Visual fields are full to confrontation   III,IV, VI Extraocular movements are full   V Facial sensation is intact   VII Facial movements are symmetrical without weakness   VIII Hearing is intact   IX,X Shoulder shrug and head rotation strength are intact   XII No tongue atrophy or fasciculations. Normal tongue protrusion. No tongue weakness  Motor:  Normal strength in both upper and lower extremities. Normal muscle tone and bulk. Deep tendon reflexes are intact and symmetrical in both upper and lower extremities. Weems's signs are absent bilaterally. There is no ankle clonus on either side. Toes are downgoing to plantar stimulation bilaterally. Sensation:  Sensation is intact to light touch, temperature, and vibration in all extremities. Coordination:  Rapid alternating movements are normal in both upper and lower extremities. Finger to nose testing is unimpaired bilaterally. Gait:  Normal station and gait.      Pertinent Diagnostic

## 2020-07-27 ENCOUNTER — OFFICE VISIT (OUTPATIENT)
Dept: NEUROSURGERY | Facility: CLINIC | Age: 50
End: 2020-07-27

## 2020-07-27 ENCOUNTER — TELEPHONE (OUTPATIENT)
Dept: NEUROLOGY | Age: 50
End: 2020-07-27

## 2020-07-27 ENCOUNTER — HOSPITAL ENCOUNTER (OUTPATIENT)
Dept: GENERAL RADIOLOGY | Facility: HOSPITAL | Age: 50
Discharge: HOME | End: 2020-07-27
Admitting: NURSE PRACTITIONER

## 2020-07-27 VITALS
DIASTOLIC BLOOD PRESSURE: 80 MMHG | BODY MASS INDEX: 42.66 KG/M2 | HEIGHT: 72 IN | SYSTOLIC BLOOD PRESSURE: 122 MMHG | WEIGHT: 315 LBS

## 2020-07-27 DIAGNOSIS — M47.812 CERVICAL SPONDYLOSIS: Primary | ICD-10-CM

## 2020-07-27 DIAGNOSIS — Z78.9 NON-SMOKER: ICD-10-CM

## 2020-07-27 PROCEDURE — 99204 OFFICE O/P NEW MOD 45 MIN: CPT | Performed by: NURSE PRACTITIONER

## 2020-07-27 PROCEDURE — 72052 X-RAY EXAM NECK SPINE 6/>VWS: CPT

## 2020-07-27 NOTE — PROGRESS NOTES
Chief complaint:   Chief Complaint   Patient presents with   • Neck Pain     Garth has been referred here today for follow up for neck, arm and hand pain, his MRI of his cervical was June of last year at [facility], we have called to have uploaded for review.  He has had bilateral carpal tunnel procedures done.  He has also had EMG/NCV of BUE by Dr. Chowdhury.  No recent physical therapy.        Subjective     HPI: This is a 50-year-old male gentleman who was referred to us by SOFIA Kang for neck and arm pain.  He is here to be evaluated today.  The patient states that the pain in his neck has been going on for several years.  The pain is constant.  It is better whenever he has his neck supported and worse whenever he has his arms above his head.  He has pain that radiates into both arms with the left being worse than the right.  Pain is constant but the severity varies in his arms.  The pain will radiate all the way down his arms to his hands.  It is made better and worse depending on position of his arms.  Denies any bowel or bladder incontinence.  He has not done any recent physical therapy with his last therapy session done in 2019.  He has not done any chiropractic care.  He was working with Dr. Li in pain management and receiving injections however he has not had any injections since November and was only having limited improvement with him at that time.  He currently is disabled and has been for over 8 years due to his pain issues as well as other mental issues that he deals with.  He is right-hand dominant.    Review of Systems   Constitutional: Positive for fatigue.   Musculoskeletal: Positive for back pain, gait problem, neck pain and neck stiffness.   Neurological: Positive for dizziness, weakness and numbness.   All other systems reviewed and are negative.       Past Medical History:   Diagnosis Date   • Anemia    • Arthritis    • Asthma    • Claustrophobia    • Gluten intolerance    • Hernia  "   • Hypertension    • OCD (obsessive compulsive disorder)    • Peptic ulcer     AND DUODENAL   • PTSD (post-traumatic stress disorder)    • Reflux gastritis    • Sleep apnea    • Spinal stenosis     injections in cervical area approx every 3 months.      Past Surgical History:   Procedure Laterality Date   • CARPAL TUNNEL RELEASE Left    • CARPAL TUNNEL RELEASE Right 2018    Procedure: RIGHT CARPAL TUNNEL RELEASE;  Surgeon: Derrell Pritchard MD;  Location: [facility] OR;  Service:    • ROTATOR CUFF REPAIR     • UMBILICAL HERNIA REPAIR      with mesh   • VASECTOMY       Family History   Problem Relation Age of Onset   • Diabetes Mother    • Hypertension Mother    • Diabetes Sister      Social History     Tobacco Use   • Smoking status: Former Smoker     Packs/day: 1.00     Years: 6.00     Pack years: 6.00     Types: Cigarettes     Last attempt to quit: 1994     Years since quittin.6   • Smokeless tobacco: Former User     Types: Snuff     Quit date: 2009   Substance Use Topics   • Alcohol use: Yes     Alcohol/week: 3.0 standard drinks     Types: 3 Cans of beer per week   • Drug use: No       (Not in a hospital admission)  Allergies:  Gabapentin; Methylprednisolone; Beta adrenergic blockers; Ibuprofen; and Proton pump inhibitors    Objective      Vital Signs  /80 (BP Location: Right arm, Patient Position: Sitting)   Ht 182.9 cm (72\")   Wt (!) 160 kg (351 lb 12.8 oz)   BMI 47.71 kg/m²     Physical Exam   Constitutional: He is oriented to person, place, and time. He appears well-developed and well-nourished.   HENT:   Head: Normocephalic.   Eyes: Pupils are equal, round, and reactive to light. Conjunctivae, EOM and lids are normal.   Neck: Normal range of motion.   Cardiovascular: Normal rate and regular rhythm.   Pulmonary/Chest: Effort normal and breath sounds normal.   Abdominal: Normal appearance.   Musculoskeletal: Normal range of motion.        Cervical back: He exhibits pain. "   Neurological: He is alert and oriented to person, place, and time. He has normal strength and normal reflexes. He displays normal reflexes. No cranial nerve deficit or sensory deficit. GCS eye subscore is 4. GCS verbal subscore is 5. GCS motor subscore is 6.   Skin: Skin is warm.   Psychiatric: He has a normal mood and affect. His speech is normal and behavior is normal. Thought content normal. Cognition and memory are normal.       Neurologic Exam     Mental Status   Oriented to person, place, and time.   Speech: speech is normal     Cranial Nerves     CN III, IV, VI   Pupils are equal, round, and reactive to light.  Extraocular motions are normal.     Motor Exam     Strength   Strength 5/5 throughout.       Results Review: MRI of the cervical spine shows multilevel disc degeneration and spondylosis throughout with disc bulging and areas of bilateral foraminal narrowing.  No fracture visualized.  No cord signal change.  Mild loss of the normal cervical curvature.        Assessment/Plan: I am going to send the patient for a x-ray of the cervical spine to include standing flexion extension.  Patient has been through multiple conservative measures without any significant improvement.  I will discuss this patient with Dr. Elmore once I have the imaging results and the nerve conduction study and see if he feels like anything from a surgical standpoint would be of a benefit to the patient versus continuing with conservative treatments.  We will follow-up with him after I get the results and can talk to Dr. Elmore.  Patient is a non-smoker  BMI shows the patient is Extremely Obese. BMI chart was given to the patient.     Jose Luis was seen today for neck pain.    Diagnoses and all orders for this visit:    Cervical spondylosis  -     XR Spine Cervical Complete With Obli Flex Ext    Body mass index (BMI) 45.0-49.9, adult (CMS/[facility])    Non-smoker          I discussed the patients findings and my recommendations with  patient    Pavel Lyn, APRN  07/27/20  10:13

## 2020-07-27 NOTE — PATIENT INSTRUCTIONS

## 2020-07-29 ENCOUNTER — TELEPHONE (OUTPATIENT)
Dept: NEUROSURGERY | Facility: CLINIC | Age: 50
End: 2020-07-29

## 2020-07-29 NOTE — TELEPHONE ENCOUNTER
Called and spoke with Linn in Dr. Jose Luis Chowdhury's office and requested copies of Jose Luis's EMG/NCV test results.  They are to fax over to the office.

## 2020-07-30 NOTE — TELEPHONE ENCOUNTER
EMG/NCV still not had not been received, called their office again and spoke with Roxy, she states she is sending that report over now.

## 2020-07-30 NOTE — TELEPHONE ENCOUNTER
EMG/ NCV report from [facility]/Dr. Chowdhury's has been routed to chart.

## 2020-09-28 ENCOUNTER — LAB REQUISITION (OUTPATIENT)
Dept: LAB | Facility: HOSPITAL | Age: 50
End: 2020-09-28

## 2020-09-28 DIAGNOSIS — Z00.00 ENCOUNTER FOR GENERAL ADULT MEDICAL EXAMINATION WITHOUT ABNORMAL FINDINGS: ICD-10-CM

## 2020-09-28 LAB — SARS-COV-2 RNA PNL SPEC NAA+PROBE: NOT DETECTED

## 2020-09-28 PROCEDURE — 87635 SARS-COV-2 COVID-19 AMP PRB: CPT

## 2020-11-02 ENCOUNTER — TELEPHONE (OUTPATIENT)
Dept: NEUROSURGERY | Facility: CLINIC | Age: 50
End: 2020-11-02

## 2020-11-02 NOTE — TELEPHONE ENCOUNTER
Pepper with patients pcp's office called to check on a f/u appt for this patient as he stated he has not been seen since 7/27/20 with Pavel. Per the note it stated Pavel would talk to Dr. Elmore on test results. Please review and contact patient.

## 2020-11-06 ENCOUNTER — TELEPHONE (OUTPATIENT)
Dept: CARDIOVASCULAR ICU | Facility: HOSPITAL | Age: 50
End: 2020-11-06

## 2020-11-06 ENCOUNTER — TELEPHONE (OUTPATIENT)
Dept: NEUROSURGERY | Facility: CLINIC | Age: 50
End: 2020-11-06

## 2020-11-06 DIAGNOSIS — M50.20 DISPLACEMENT OF CERVICAL INTERVERTEBRAL DISC WITHOUT MYELOPATHY: Primary | ICD-10-CM

## 2020-11-06 NOTE — TELEPHONE ENCOUNTER
Called pt to let him know we have faxed his therapy order to [facility] and that he just needs to call to schedule an appt. Pt voiced understanding.

## 2020-11-06 NOTE — TELEPHONE ENCOUNTER
Physical therapy order faxed  appt made, reminder mailed to patient  Pepper called patient to let him know      med lowery CMA

## 2020-11-06 NOTE — TELEPHONE ENCOUNTER
Call patient with results of his imaging and after discussing everything with Dr. Elmore he said he may potentially need a surgical intervention for his neck.  He is can go through physical therapy and then follow-up with Dr. Elmore for further evaluation and see if surgery is the next appropriate step for him

## 2020-11-10 ENCOUNTER — TRANSCRIBE ORDERS (OUTPATIENT)
Dept: ADMINISTRATIVE | Facility: HOSPITAL | Age: 50
End: 2020-11-10

## 2020-11-10 DIAGNOSIS — R42 DIZZINESS AND GIDDINESS: Primary | ICD-10-CM

## 2020-11-17 ENCOUNTER — HOSPITAL ENCOUNTER (OUTPATIENT)
Dept: ULTRASOUND IMAGING | Facility: HOSPITAL | Age: 50
Discharge: HOME | End: 2020-11-17
Admitting: NURSE PRACTITIONER

## 2020-11-17 DIAGNOSIS — R42 DIZZINESS AND GIDDINESS: ICD-10-CM

## 2020-11-17 PROCEDURE — 93880 EXTRACRANIAL BILAT STUDY: CPT | Performed by: SURGERY

## 2020-11-17 PROCEDURE — 93880 EXTRACRANIAL BILAT STUDY: CPT

## 2020-12-28 ENCOUNTER — TELEPHONE (OUTPATIENT)
Dept: NEUROLOGY | Age: 50
End: 2020-12-28

## 2020-12-28 NOTE — TELEPHONE ENCOUNTER
Called and spoke with patient to let patient know that his appointment for today 12-28-20 with Dr. Zelda Rosenberg had to be rescheduled due to the provider is out of the office. Patient is aware of the appointment time/date of when I have his appointment rescheduled too.

## 2021-01-12 ENCOUNTER — OFFICE VISIT (OUTPATIENT)
Dept: NEUROSURGERY | Facility: CLINIC | Age: 51
End: 2021-01-12

## 2021-01-12 VITALS
WEIGHT: 315 LBS | DIASTOLIC BLOOD PRESSURE: 78 MMHG | HEIGHT: 72 IN | SYSTOLIC BLOOD PRESSURE: 126 MMHG | BODY MASS INDEX: 42.66 KG/M2

## 2021-01-12 DIAGNOSIS — R53.1 WEAKNESS: ICD-10-CM

## 2021-01-12 DIAGNOSIS — Z78.9 NON-SMOKER: ICD-10-CM

## 2021-01-12 DIAGNOSIS — M47.812 CERVICAL SPONDYLOSIS: ICD-10-CM

## 2021-01-12 DIAGNOSIS — G56.01 RIGHT CARPAL TUNNEL SYNDROME: ICD-10-CM

## 2021-01-12 DIAGNOSIS — G56.02 LEFT CARPAL TUNNEL SYNDROME: ICD-10-CM

## 2021-01-12 DIAGNOSIS — M50.20 DISPLACEMENT OF CERVICAL INTERVERTEBRAL DISC WITHOUT MYELOPATHY: Primary | ICD-10-CM

## 2021-01-12 DIAGNOSIS — M54.12 CERVICAL RADICULOPATHY: ICD-10-CM

## 2021-01-12 PROCEDURE — 99214 OFFICE O/P EST MOD 30 MIN: CPT | Performed by: NEUROLOGICAL SURGERY

## 2021-01-12 RX ORDER — LAMOTRIGINE 25 MG/1
TABLET ORAL
COMMUNITY

## 2021-01-12 NOTE — PATIENT INSTRUCTIONS
"PATIENT TO CONTINUE TO FOLLOW UP WITH HIS PRIMARY CARE PROVIDER FOR YEARLY PHYSICAL EXAMS TO ENSURE COMPLETE HEALTH MAINTENANCE        BMI for Adults  What is BMI?  Body mass index (BMI) is a number that is calculated from a person's weight and height. BMI can help estimate how much of a person's weight is composed of fat. BMI does not measure body fat directly. Rather, it is an alternative to procedures that directly measure body fat, which can be difficult and expensive.  BMI can help identify people who may be at higher risk for certain medical problems.  What are BMI measurements used for?  BMI is used as a screening tool to identify possible weight problems. It helps determine whether a person is obese, overweight, a healthy weight, or underweight.  BMI is useful for:  · Identifying a weight problem that may be related to a medical condition or may increase the risk for medical problems.  · Promoting changes, such as changes in diet and exercise, to help reach a healthy weight. BMI screening can be repeated to see if these changes are working.  How is BMI calculated?  BMI involves measuring your weight in relation to your height. Both height and weight are measured, and the BMI is calculated from those numbers. This can be done either in English ([place] or metric measurements. Note that charts and online BMI calculators are available to help you find your BMI quickly and easily without having to do these calculations yourself.  To calculate your BMI in English ([place] measurements:    1. Measure your weight in pounds (lb).  2. Multiply the number of pounds by 703.  ? For example, for a person who weighs 180 lb, multiply that number by 703, which equals 126,540.  3. Measure your height in inches. Then multiply that number by itself to get a measurement called \"inches squared.\"  ? For example, for a person who is 70 inches tall, the \"inches squared\" measurement is 70 inches x 70 inches, which equals 4,900 inches " "squared.  4. Divide the total from step 2 (number of lb x 703) by the total from step 3 (inches squared): 126,540 ÷ 4,900 = 25.8. This is your BMI.  To calculate your BMI in metric measurements:  1. Measure your weight in kilograms (kg).  2. Measure your height in meters (m). Then multiply that number by itself to get a measurement called \"meters squared.\"  ? For example, for a person who is 1.75 m tall, the \"meters squared\" measurement is 1.75 m x 1.75 m, which is equal to 3.1 meters squared.  3. Divide the number of kilograms (your weight) by the meters squared number. In this example: 70 ÷ 3.1 = 22.6. This is your BMI.  What do the results mean?  BMI charts are used to identify whether you are underweight, normal weight, overweight, or obese. The following guidelines will be used:  · Underweight: BMI less than 18.5.  · Normal weight: BMI between 18.5 and 24.9.  · Overweight: BMI between 25 and 29.9.  · Obese: BMI of 30 or above.  Keep these notes in mind:  · Weight includes both fat and muscle, so someone with a muscular build, such as an athlete, may have a BMI that is higher than 24.9. In cases like these, BMI is not an accurate measure of body fat.  · To determine if excess body fat is the cause of a BMI of 25 or higher, further assessments may need to be done by a health care provider.  · BMI is usually interpreted in the same way for men and women.  Where to find more information  For more information about BMI, including tools to quickly calculate your BMI, go to these websites:  · Centers for Disease Control and Prevention: www.cdc.gov  · American Heart Association: www.heart.org  · National Heart, Lung, and Blood [place]: www.nhlbi.nih.gov  Summary  · Body mass index (BMI) is a number that is calculated from a person's weight and height.  · BMI may help estimate how much of a person's weight is composed of fat. BMI can help identify those who may be at higher risk for certain medical problems.  · BMI " "can be measured using English measurements or metric measurements.  · BMI charts are used to identify whether you are underweight, normal weight, overweight, or obese.  This information is not intended to replace advice given to you by your health care provider. Make sure you discuss any questions you have with your health care provider.  Document Revised: 09/09/2020 Document Reviewed: 07/17/2020  [employer] Patient Education © 2020 [employer]      DASH Eating Plan  DASH stands for \"Dietary Approaches to Stop Hypertension.\" The DASH eating plan is a healthy eating plan that has been shown to reduce high blood pressure (hypertension). It may also reduce your risk for type 2 diabetes, heart disease, and stroke. The DASH eating plan may also help with weight loss.  What are tips for following this plan?    General guidelines  · Avoid eating more than 2,300 mg (milligrams) of salt (sodium) a day. If you have hypertension, you may need to reduce your sodium intake to 1,500 mg a day.  · Limit alcohol intake to no more than 1 drink a day for nonpregnant women and 2 drinks a day for men. One drink equals 12 oz of beer, 5 oz of wine, or 1½ oz of hard liquor.  · Work with your health care provider to maintain a healthy body weight or to lose weight. Ask what an ideal weight is for you.  · Get at least 30 minutes of exercise that causes your heart to beat faster (aerobic exercise) most days of the week. Activities may include walking, swimming, or biking.  · Work with your health care provider or diet and nutrition specialist (dietitian) to adjust your eating plan to your individual calorie needs.  Reading food labels    · Check food labels for the amount of sodium per serving. Choose foods with less than 5 percent of the Daily Value of sodium. Generally, foods with less than 300 mg of sodium per serving fit into this eating plan.  · To find whole grains, look for the word \"whole\" as the first word in the ingredient " "list.  Shopping  · Buy products labeled as \"low-sodium\" or \"no salt added.\"  · Buy fresh foods. Avoid canned foods and premade or frozen meals.  Cooking  · Avoid adding salt when cooking. Use salt-free seasonings or herbs instead of table salt or sea salt. Check with your health care provider or pharmacist before using salt substitutes.  · Do not molina foods. Cook foods using healthy methods such as baking, boiling, grilling, and broiling instead.  · Cook with heart-healthy oils, such as olive, canola, soybean, or sunflower oil.  Meal planning  · Eat a balanced diet that includes:  ? 5 or more servings of fruits and vegetables each day. At each meal, try to fill half of your plate with fruits and vegetables.  ? Up to 6-8 servings of whole grains each day.  ? Less than 6 oz of lean meat, poultry, or fish each day. A 3-oz serving of meat is about the same size as a deck of cards. One egg equals 1 oz.  ? 2 servings of low-fat dairy each day.  ? A serving of nuts, seeds, or beans 5 times each week.  ? Heart-healthy fats. Healthy fats called Omega-3 fatty acids are found in foods such as flaxseeds and coldwater fish, like sardines, salmon, and mackerel.  · Limit how much you eat of the following:  ? Canned or prepackaged foods.  ? Food that is high in trans fat, such as fried foods.  ? Food that is high in saturated fat, such as fatty meat.  ? Sweets, desserts, sugary drinks, and other foods with added sugar.  ? Full-fat dairy products.  · Do not salt foods before eating.  · Try to eat at least 2 vegetarian meals each week.  · Eat more home-cooked food and less restaurant, buffet, and fast food.  · When eating at a restaurant, ask that your food be prepared with less salt or no salt, if possible.  What foods are recommended?  The items listed may not be a complete list. Talk with your dietitian about what dietary choices are best for you.  Grains  Whole-grain or whole-wheat bread. Whole-grain or whole-wheat pasta. Brown " rice. Oatmeal. Quinoa. Bulgur. Whole-grain and low-sodium cereals. Rosie bread. Low-fat, low-sodium crackers. Whole-wheat flour tortillas.  Vegetables  Fresh or frozen vegetables (raw, steamed, roasted, or grilled). Low-sodium or reduced-sodium tomato and vegetable juice. Low-sodium or reduced-sodium tomato sauce and tomato paste. Low-sodium or reduced-sodium canned vegetables.  Fruits  All fresh, dried, or frozen fruit. Canned fruit in natural juice (without added sugar).  Meat and other protein foods  Skinless chicken or turkey. Ground chicken or turkey. Pork with fat trimmed off. Fish and seafood. Egg whites. Dried beans, peas, or lentils. Unsalted nuts, nut butters, and seeds. Unsalted canned beans. Lean cuts of beef with fat trimmed off. Low-sodium, lean deli meat.  Dairy  Low-fat (1%) or fat-free (skim) milk. Fat-free, low-fat, or reduced-fat cheeses. Nonfat, low-sodium ricotta or cottage cheese. Low-fat or nonfat yogurt. Low-fat, low-sodium cheese.  Fats and oils  Soft margarine without trans fats. Vegetable oil. Low-fat, reduced-fat, or light mayonnaise and salad dressings (reduced-sodium). Canola, safflower, olive, soybean, and sunflower oils. Avocado.  Seasoning and other foods  Herbs. Spices. Seasoning mixes without salt. Unsalted popcorn and pretzels. Fat-free sweets.  What foods are not recommended?  The items listed may not be a complete list. Talk with your dietitian about what dietary choices are best for you.  Grains  Baked goods made with fat, such as croissants, muffins, or some breads. Dry pasta or rice meal packs.  Vegetables  Creamed or fried vegetables. Vegetables in a cheese sauce. Regular canned vegetables (not low-sodium or reduced-sodium). Regular canned tomato sauce and paste (not low-sodium or reduced-sodium). Regular tomato and vegetable juice (not low-sodium or reduced-sodium). Pickles. Olives.  Fruits  Canned fruit in a light or heavy syrup. Fried fruit. Fruit in cream or butter  sauce.  Meat and other protein foods  Fatty cuts of meat. Ribs. Fried meat. Moran. Sausage. Bologna and other processed lunch meats. Salami. Fatback. Hotdogs. Bratwurst. Salted nuts and seeds. Canned beans with added salt. Canned or smoked fish. Whole eggs or egg yolks. Chicken or turkey with skin.  Dairy  Whole or 2% milk, cream, and half-and-half. Whole or full-fat cream cheese. Whole-fat or sweetened yogurt. Full-fat cheese. Nondairy creamers. Whipped toppings. Processed cheese and cheese spreads.  Fats and oils  Butter. Stick margarine. Lard. Shortening. Ghee. Moran fat. Tropical oils, such as coconut, palm kernel, or palm oil.  Seasoning and other foods  Salted popcorn and pretzels. Onion salt, garlic salt, seasoned salt, table salt, and sea salt. Worcestershire sauce. Tartar sauce. Barbecue sauce. Teriyaki sauce. Soy sauce, including reduced-sodium. Steak sauce. Canned and packaged gravies. Fish sauce. Oyster sauce. Cocktail sauce. Horseradish that you find on the shelf. Ketchup. Mustard. Meat flavorings and tenderizers. Bouillon cubes. Hot sauce and Tabasco sauce. Premade or packaged marinades. Premade or packaged taco seasonings. Relishes. Regular salad dressings.  Where to find more information:  · National Heart, Lung, and Blood [place]: www.nhlbi.nih.gov  · American Heart Association: www.heart.org  Summary  · The DASH eating plan is a healthy eating plan that has been shown to reduce high blood pressure (hypertension). It may also reduce your risk for type 2 diabetes, heart disease, and stroke.  · With the DASH eating plan, you should limit salt (sodium) intake to 2,300 mg a day. If you have hypertension, you may need to reduce your sodium intake to 1,500 mg a day.  · When on the DASH eating plan, aim to eat more fresh fruits and vegetables, whole grains, lean proteins, low-fat dairy, and heart-healthy fats.  · Work with your health care provider or diet and nutrition specialist (dietitian) to adjust  your eating plan to your individual calorie needs.  This information is not intended to replace advice given to you by your health care provider. Make sure you discuss any questions you have with your health care provider.  Document Revised: 11/30/2018 Document Reviewed: 12/11/2017  Elsevier Patient Education © 2020 [employer] Inc.

## 2021-01-12 NOTE — H&P
SUBJECTIVE:  Patient ID: Jose Luis Ziegler is a 50 y.o. male is here today for follow-up.    Chief Complaint: Neck  pain  Chief Complaint   Patient presents with   • NECK & ARM PAIN     patient is here for follow up to discuss further options; patient is undergoing therapy at this time with [facility] in [place] (had #7 yesterday); patient has seen Dr Li and had PMI w/o significant improvement; patient had BCTR - however, EMG/NCV BUE on 1/20/2020 shows BCTS; patient has xrays @ [facility] and MRI @ [facility] (shared).       HPI  Is a 50-year-old gentleman that we have been following for neck pain and bilateral upper extremity pain.  He is here in follow-up after his most recent dedicated course of physical therapy.  He complains of neck pain and left greater than right paraspinal radicular pain down the arms into the hands along with numbness and tingling.  He just finished a dedicated course of physical therapy which included traction.  He said he got short-term relief from the traction sessions but no long-term improvement.  He has had several pain management interventions from Dr. Li all with only short-term improvement.  He has had carpal tunnel release surgery by Dr. Irizarry about 2 years ago which he said did significantly help the discomfort he was having in his hands.  He has also been diagnosed with a severe left cubital tunnel syndrome.    The following portions of the patient's history were reviewed and updated as appropriate: allergies, current medications, past family history, past medical history, past social history, past surgical history and problem list.    OBJECTIVE:    Review of Systems   Musculoskeletal: Positive for neck pain.   Neurological: Positive for numbness.          Physical Exam  Eyes:      Extraocular Movements: EOM normal.      Pupils: Pupils are equal, round, and reactive to light.   Neurological:      Mental Status: He is oriented to person, place, and time.       Coordination: Finger-Nose-Finger Test normal.      Gait: Gait is intact.      Deep Tendon Reflexes: Strength normal.   Psychiatric:         Speech: Speech normal.         Neurologic Exam     Mental Status   Oriented to person, place, and time.   Attention: normal.   Speech: speech is normal   Level of consciousness: alert  Knowledge: good.     Cranial Nerves     CN II   Visual fields full to confrontation.     CN III, IV, VI   Pupils are equal, round, and reactive to light.  Extraocular motions are normal.     CN V   Facial sensation intact.     CN VII   Facial expression full, symmetric.     CN VIII   CN VIII normal.     CN IX, X   CN IX normal.   CN X normal.     CN XI   CN XI normal.     CN XII   CN XII normal.     Motor Exam   Muscle bulk: normal  Overall muscle tone: normal  Right arm pronator drift: absent  Left arm pronator drift: absent    Strength   Strength 5/5 throughout.     Sensory Exam   Light touch normal.   Pinprick normal.     Gait, Coordination, and Reflexes     Gait  Gait: normal    Coordination   Finger to nose coordination: normal    Tremor   Resting tremor: absent  Intention tremor: absent  Action tremor: absent    Reflexes   Reflexes 2+ except as noted.       Independent Review of Radiographic Studies:       ASSESSMENT/PLAN:  The patient complains of primarily left paraspinal neck pain and left upper extremity radicular pain numbness or tingling.  He also has some right-sided symptoms.  His MRI demonstrates severe leftward stenosis at C6-7, bilateral stenosis at C5-6 and severe leftward stenosis at C4-5.  I do believe his neck is primarily responsible for his discomfort.  He has been through an extensive course of conservative care and at this point I would recommend a 3 level anterior cervical fusion.  The risks and benefits were discussed at length which included but were not limited to infection, bleeding, paralysis, spinal fluid leak, bowel bladder incontinence, speech and swallow  difficulty, stroke, coma, and death.      1. Displacement of cervical intervertebral disc without myelopathy    2. Cervical spondylosis    3. Cervical radiculopathy    4. Right carpal tunnel syndrome    5. Left carpal tunnel syndrome    6. Weakness    7. Non-smoker    8. Body mass index (BMI) 45.0-49.9, adult        The patient's Body mass index is 45.54 kg/m².. BMI is above normal parameters. Recommendations include: educational material    Return for POSTOPERATIVELY.      Antony Elmore MD

## 2021-02-01 ENCOUNTER — TELEPHONE (OUTPATIENT)
Dept: NEUROLOGY | Age: 51
End: 2021-02-01

## 2021-02-01 NOTE — TELEPHONE ENCOUNTER
Called pt to let them know we had to reschedule appt that was on 2/4/21 with Dr. Alysa Ramirez, he has had a death in the family. Pt should be aware of his new appt time and date, but he was not happy and he hung up on me.

## 2021-03-24 ENCOUNTER — APPOINTMENT (OUTPATIENT)
Dept: PREADMISSION TESTING | Facility: HOSPITAL | Age: 51
End: 2021-03-24

## 2021-03-24 VITALS
HEIGHT: 73 IN | RESPIRATION RATE: 18 BRPM | SYSTOLIC BLOOD PRESSURE: 153 MMHG | OXYGEN SATURATION: 96 % | BODY MASS INDEX: 41.75 KG/M2 | HEART RATE: 84 BPM | DIASTOLIC BLOOD PRESSURE: 85 MMHG | WEIGHT: 315 LBS

## 2021-03-24 DIAGNOSIS — M54.12 CERVICAL RADICULOPATHY: ICD-10-CM

## 2021-03-24 DIAGNOSIS — M47.812 CERVICAL SPONDYLOSIS: ICD-10-CM

## 2021-03-24 DIAGNOSIS — M50.20 DISPLACEMENT OF CERVICAL INTERVERTEBRAL DISC WITHOUT MYELOPATHY: ICD-10-CM

## 2021-03-24 LAB
ALBUMIN SERPL-MCNC: 4.4 G/DL (ref 3.5–5.2)
ALBUMIN/GLOB SERPL: 1.3 G/DL
ALP SERPL-CCNC: 74 U/L (ref 39–117)
ALT SERPL W P-5'-P-CCNC: 25 U/L (ref 1–41)
ANION GAP SERPL CALCULATED.3IONS-SCNC: 10 MMOL/L (ref 5–15)
AST SERPL-CCNC: 19 U/L (ref 1–40)
BACTERIA UR QL AUTO: ABNORMAL /HPF
BILIRUB SERPL-MCNC: 0.4 MG/DL (ref 0–1.2)
BILIRUB UR QL STRIP: NEGATIVE
BUN SERPL-MCNC: 16 MG/DL (ref 6–20)
BUN/CREAT SERPL: 22.5 (ref 7–25)
CALCIUM SPEC-SCNC: 9.7 MG/DL (ref 8.6–10.5)
CHLORIDE SERPL-SCNC: 102 MMOL/L (ref 98–107)
CLARITY UR: CLEAR
CO2 SERPL-SCNC: 26 MMOL/L (ref 22–29)
COLOR UR: YELLOW
CREAT SERPL-MCNC: 0.71 MG/DL (ref 0.76–1.27)
DEPRECATED RDW RBC AUTO: 49.1 FL (ref 37–54)
ERYTHROCYTE [DISTWIDTH] IN BLOOD BY AUTOMATED COUNT: 15.4 % (ref 12.3–15.4)
GFR SERPL CREATININE-BSD FRML MDRD: 117 ML/MIN/1.73
GLOBULIN UR ELPH-MCNC: 3.3 GM/DL
GLUCOSE SERPL-MCNC: 122 MG/DL (ref 65–99)
GLUCOSE UR STRIP-MCNC: NEGATIVE MG/DL
HCT VFR BLD AUTO: 43.3 % (ref 37.5–51)
HGB BLD-MCNC: 14.3 G/DL (ref 13–17.7)
HGB UR QL STRIP.AUTO: NEGATIVE
HYALINE CASTS UR QL AUTO: ABNORMAL /LPF
KETONES UR QL STRIP: NEGATIVE
LEUKOCYTE ESTERASE UR QL STRIP.AUTO: NEGATIVE
MCH RBC QN AUTO: 28.9 PG (ref 26.6–33)
MCHC RBC AUTO-ENTMCNC: 33 G/DL (ref 31.5–35.7)
MCV RBC AUTO: 87.7 FL (ref 79–97)
NITRITE UR QL STRIP: NEGATIVE
PH UR STRIP.AUTO: 6 [PH] (ref 5–8)
PLATELET # BLD AUTO: 259 10*3/MM3 (ref 140–450)
PMV BLD AUTO: 10.3 FL (ref 6–12)
POTASSIUM SERPL-SCNC: 4 MMOL/L (ref 3.5–5.2)
PROT SERPL-MCNC: 7.7 G/DL (ref 6–8.5)
PROT UR QL STRIP: ABNORMAL
RBC # BLD AUTO: 4.94 10*6/MM3 (ref 4.14–5.8)
RBC # UR: ABNORMAL /HPF
REF LAB TEST METHOD: ABNORMAL
SODIUM SERPL-SCNC: 138 MMOL/L (ref 136–145)
SP GR UR STRIP: >=1.03 (ref 1–1.03)
SQUAMOUS #/AREA URNS HPF: ABNORMAL /HPF
UROBILINOGEN UR QL STRIP: ABNORMAL
WBC # BLD AUTO: 7.8 10*3/MM3 (ref 3.4–10.8)
WBC UR QL AUTO: ABNORMAL /HPF

## 2021-03-24 PROCEDURE — 36415 COLL VENOUS BLD VENIPUNCTURE: CPT

## 2021-03-24 PROCEDURE — 85027 COMPLETE CBC AUTOMATED: CPT

## 2021-03-24 PROCEDURE — 80053 COMPREHEN METABOLIC PANEL: CPT

## 2021-03-24 PROCEDURE — 93005 ELECTROCARDIOGRAM TRACING: CPT

## 2021-03-24 PROCEDURE — 93010 ELECTROCARDIOGRAM REPORT: CPT | Performed by: INTERNAL MEDICINE

## 2021-03-24 PROCEDURE — 81001 URINALYSIS AUTO W/SCOPE: CPT

## 2021-03-24 RX ORDER — ASPIRIN 81 MG/1
81 TABLET ORAL DAILY
COMMUNITY

## 2021-03-24 RX ORDER — CHLORAL HYDRATE 500 MG
CAPSULE ORAL
COMMUNITY
End: 2022-07-26

## 2021-03-24 RX ORDER — FLUCONAZOLE 100 MG/1
100 TABLET ORAL DAILY
COMMUNITY
Start: 2021-03-20 | End: 2021-03-29

## 2021-03-24 RX ORDER — FAMOTIDINE 40 MG/1
40 TABLET, FILM COATED ORAL DAILY
COMMUNITY

## 2021-03-24 RX ORDER — FOLIC ACID 1 MG/1
1 TABLET ORAL DAILY
COMMUNITY

## 2021-03-24 RX ORDER — ACETAMINOPHEN 500 MG
1000 TABLET ORAL 2 TIMES DAILY
COMMUNITY

## 2021-03-24 RX ORDER — IPRATROPIUM BROMIDE AND ALBUTEROL SULFATE 2.5; .5 MG/3ML; MG/3ML
3 SOLUTION RESPIRATORY (INHALATION) EVERY 4 HOURS PRN
COMMUNITY

## 2021-03-24 RX ORDER — LEVOTHYROXINE SODIUM 0.05 MG/1
50 TABLET ORAL DAILY
COMMUNITY

## 2021-03-24 RX ORDER — MOMETASONE FUROATE 50 UG/1
2 SPRAY, METERED NASAL DAILY
COMMUNITY

## 2021-03-24 RX ORDER — LISINOPRIL 10 MG/1
10 TABLET ORAL DAILY
COMMUNITY

## 2021-03-24 NOTE — DISCHARGE INSTRUCTIONS
DAY OF SURGERY INSTRUCTIONS        YOUR SURGEON: Dr. Elmore    PROCEDURE: Cervical Discectomy Anterior Interbody Fusion, Anterior Cervical Instrumentation Cervical 4-5, 5-6, 6-7    DATE OF SURGERY: March 31, 2021    ARRIVAL TIME: AS DIRECTED BY OFFICE    YOU MAY TAKE THE FOLLOWING MEDICATION(S) THE MORNING OF SURGERY WITH A SIP OF WATER: ***      ALL OTHER HOME MEDICATION CHECK WITH YOUR PHYSICIAN      DO NOT TAKE ANY ERECTILE DYSFUNCTION MEDICATIONS (EX: CIALIS, VIAGRA) 24 HOURS PRIOR TO SURGERY                      MANAGING PAIN AFTER SURGERY    We know you are probably wondering what your pain will be like after surgery.  Following surgery it is unrealistic to expect you will not have pain.   Pain is how our bodies let us know that something is wrong or cautions us to be careful.  That said, our goal is to make your pain tolerable.    Methods we may use to treat your pain include (oral or IV medications, PCAs, epidurals, nerve blocks, etc.)   While some procedures require IV pain medications for a short time after surgery, transitioning to pain medications by mouth allows for better management of pain.   Your nurse will encourage you to take oral pain medications whenever possible.  IV medications work almost immediately, but only last a short while.  Taking medications by mouth allows for a more constant level of medication in your blood stream for a longer period of time.      Once your pain is out of control it is harder to get back under control.  It is important you are aware when your next dose of pain medication is due.  If you are admitted, your nurse may write the time of your next dose on the white board in your room to help you remember.      We are interested in your pain and encourage you to inform us about aggravating factors during your visit.   Many times a simple repositioning every few hours can make a big difference.    If your physician says it is okay, do not let your pain prevent you from  getting out of bed. Be sure to call your nurse for assistance prior to getting up so you do not fall.      Before surgery, please decide your tolerable pain goal.  These faces help describe the pain ratings we use on a 0-10 scale.   Be prepared to tell us your goal and whether or not you take pain or anxiety medications at home.          BEFORE YOU COME TO THE HOSPITAL  (Pre-op instructions)  • Do not eat, drink, smoke or chew gum after midnight the night before surgery.  This also includes no mints.  • Morning of surgery take only the medicines you have been instructed with a sip of water unless otherwise instructed  by your physician.  • Do not shave, wear makeup or dark nail polish.  • Remove all jewelry including rings.  • Leave anything you consider valuable at home.  • Leave your suitcase in the car until after your surgery.  • Bring the following with you if applicable:  o Picture ID and insurance, Medicare or Medicaid cards  o Co-pay/deductible required by insurance (cash, check, credit card)  o Copy of advance directive, living will or power-of- documents if not brought to PAT  o CPAP or BIPAP mask and tubing  o Relaxation aids ( book, magazine), etc.  o Hearing aids                        ON THE DAY OF SURGERY  · On the day of surgery check in at registration located at the main entrance of the hospital.   ? You will be registered and given a beeper with instructions where to wait in the main lobby.  ? When your beeper lights up and vibrates a member of the Outpatient Surgery staff will meet you at the double doors under the stair steps and escort you to your preoperative room.   · You may have cloth compression devices placed on your legs. These help to prevent blood clots and reduce swelling in your legs.  · An IV may be inserted into one of your veins.  · In the operating room, you may be given one or more of the following:  ? A medicine to help you relax (sedative).  ? A medicine to numb the  "area (local anesthetic).  ? A medicine to make you fall asleep (general anesthetic).  ? A medicine that is injected into an area of your body to numb everything below the injection site (regional anesthetic).  · Your surgical site will be marked or identified.  · You may be given an antibiotic through your IV to help prevent infection.  Contact a health care provider if you:  · Develop a fever of more than 100.4°F (38°C) or other feelings of illness during the 48 hours before your surgery.  · Have symptoms that get worse.  Have questions or concerns about your surgery    General Anesthesia/Surgery, Adult  General anesthesia is the use of medicines to make a person \"go to sleep\" (unconscious) for a medical procedure. General anesthesia must be used for certain procedures, and is often recommended for procedures that:  · Last a long time.  · Require you to be still or in an unusual position.  · Are major and can cause blood loss.  The medicines used for general anesthesia are called general anesthetics. As well as making you unconscious for a certain amount of time, these medicines:  · Prevent pain.  · Control your blood pressure.  · Relax your muscles.  Tell a health care provider about:  · Any allergies you have.  · All medicines you are taking, including vitamins, herbs, eye drops, creams, and over-the-counter medicines.  · Any problems you or family members have had with anesthetic medicines.  · Types of anesthetics you have had in the past.  · Any blood disorders you have.  · Any surgeries you have had.  · Any medical conditions you have.  · Any recent upper respiratory, chest, or ear infections.  · Any history of:  ? Heart or lung conditions, such as heart failure, sleep apnea, asthma, or chronic obstructive pulmonary disease (COPD).  ?  service.  ? Depression or anxiety.  · Any tobacco or drug use, including marijuana or alcohol use.  · Whether you are pregnant or may be pregnant.  What are the " risks?  Generally, this is a safe procedure. However, problems may occur, including:  · Allergic reaction.  · Lung and heart problems.  · Inhaling food or liquid from the stomach into the lungs (aspiration).  · Nerve injury.  · Air in the bloodstream, which can lead to stroke.  · Extreme agitation or confusion (delirium) when you wake up from the anesthetic.  · Waking up during your procedure and being unable to move. This is rare.  These problems are more likely to develop if you are having a major surgery or if you have an advanced or serious medical condition. You can prevent some of these complications by answering all of your health care provider's questions thoroughly and by following all instructions before your procedure.  General anesthesia can cause side effects, including:  · Nausea or vomiting.  · A sore throat from the breathing tube.  · Hoarseness.  · Wheezing or coughing.  · Shaking chills.  · Tiredness.  · Body aches.  · Anxiety.  · Sleepiness or drowsiness.  · Confusion or agitation.  RISKS AND COMPLICATIONS OF SURGERY  Your health care provider will discuss possible risks and complications with you before surgery. Common risks and complications include:    · Problems due to the use of anesthetics.  · Blood loss and replacement (does not apply to minor surgical procedures).  · Temporary increase in pain due to surgery.  · Uncorrected pain or problems that the surgery was meant to correct.  · Infection.  · New damage.    What happens before the procedure?    Medicines  Ask your health care provider about:  · Changing or stopping your regular medicines. This is especially important if you are taking diabetes medicines or blood thinners.  · Taking medicines such as aspirin and ibuprofen. These medicines can thin your blood. Do not take these medicines unless your health care provider tells you to take them.  · Taking over-the-counter medicines, vitamins, herbs, and supplements. Do not take these during  the week before your procedure unless your health care provider approves them.  General instructions  · Starting 3-6 weeks before the procedure, do not use any products that contain nicotine or tobacco, such as cigarettes and e-cigarettes. If you need help quitting, ask your health care provider.  · If you brush your teeth on the morning of the procedure, make sure to spit out all of the toothpaste.  · Tell your health care provider if you become ill or develop a cold, cough, or fever.  · If instructed by your health care provider, bring your sleep apnea device with you on the day of your surgery (if applicable).  · Ask your health care provider if you will be going home the same day, the following day, or after a longer hospital stay.  ? Plan to have someone take you home from the hospital or clinic.  ? Plan to have a responsible adult care for you for at least 24 hours after you leave the hospital or clinic. This is important.  What happens during the procedure?  · You will be given anesthetics through both of the following:  ? A mask placed over your nose and mouth.  ? An IV in one of your veins.  · You may receive a medicine to help you relax (sedative).  · After you are unconscious, a breathing tube may be inserted down your throat to help you breathe. This will be removed before you wake up.  · An anesthesia specialist will stay with you throughout your procedure. He or she will:  ? Keep you comfortable and safe by continuing to give you medicines and adjusting the amount of medicine that you get.  ? Monitor your blood pressure, pulse, and oxygen levels to make sure that the anesthetics do not cause any problems.  The procedure may vary among health care providers and hospitals.  What happens after the procedure?  · Your blood pressure, temperature, heart rate, breathing rate, and blood oxygen level will be monitored until the medicines you were given have worn off.  · You will wake up in a recovery area. You  may wake up slowly.  · If you feel anxious or agitated, you may be given medicine to help you calm down.  · If you will be going home the same day, your health care provider may check to make sure you can walk, drink, and urinate.  · Your health care provider will treat any pain or side effects you have before you go home.  · Do not drive for 24 hours if you were given a sedative.  Summary  · General anesthesia is used to keep you still and prevent pain during a procedure.  · It is important to tell your healthcare provider about your medical history and any surgeries you have had, and previous experience with anesthesia.  · Follow your healthcare provider’s instructions about when to stop eating, drinking, or taking certain medicines before your procedure.  · Plan to have someone take you home from the hospital or clinic.  This information is not intended to replace advice given to you by your health care provider. Make sure you discuss any questions you have with your health care provider.  Document Released: 03/26/2009 Document Revised: 08/03/2018 Document Reviewed: 08/03/2018  [employer] Interactive Patient Education © 2019 [employer]       Fall Prevention in Hospitals, Adult  As a hospital patient, your condition and the treatments you receive can increase your risk for falls. Some additional risk factors for falls in a hospital include:  · Being in an unfamiliar environment.  · Being on bed rest.  · Your surgery.  · Taking certain medicines.  · Your tubing requirements, such as intravenous (IV) therapy or catheters.  It is important that you learn how to decrease fall risks while at the hospital. Below are important tips that can help prevent falls.  SAFETY TIPS FOR PREVENTING FALLS  Talk about your risk of falling.  · Ask your health care provider why you are at risk for falling. Is it your medicine, illness, tubing placement, or something else?  · Make a plan with your health care provider to keep you safe  from falls.  · Ask your health care provider or pharmacist about side effects of your medicines. Some medicines can make you dizzy or affect your coordination.  Ask for help.  · Ask for help before getting out of bed. You may need to press your call button.  · Ask for assistance in getting safely to the toilet.  · Ask for a walker or cane to be put at your bedside. Ask that most of the side rails on your bed be placed up before your health care provider leaves the room.  · Ask family or friends to sit with you.  · Ask for things that are out of your reach, such as your glasses, hearing aids, telephone, bedside table, or call button.  Follow these tips to avoid falling:  · Stay lying or seated, rather than standing, while waiting for help.  · Wear rubber-soled slippers or shoes whenever you walk in the hospital.  · Avoid quick, sudden movements.  ¨ Change positions slowly.  ¨ Sit on the side of your bed before standing.  ¨ Stand up slowly and wait before you start to walk.  · Let your health care provider know if there is a spill on the floor.  · Pay careful attention to the medical equipment, electrical cords, and tubes around you.  · When you need help, use your call button by your bed or in the bathroom. Wait for one of your health care providers to help you.  · If you feel dizzy or unsure of your footing, return to bed and wait for assistance.  · Avoid being distracted by the TV, telephone, or another person in your room.  · Do not lean or support yourself on rolling objects, such as IV poles or bedside tables.     This information is not intended to replace advice given to you by your health care provider. Make sure you discuss any questions you have with your health care provider.     Document Released: 12/15/2001 Document Revised: 01/08/2016 Document Reviewed: 08/25/2013  [employer] Interactive Patient Education ©2016 [employer]       [facility] 4% Patient Instruction Sheet    Chlorhexidine  Before Surgery  Chlorhexidine gluconate (CHG) is a germ-killing (antiseptic) solution that is used to clean the skin. It gets rid of the bacteria that normally live on the skin. Cleaning your skin with CHG before surgery helps lower the risk for infection after surgery.    How to use CHG solution  · You will take 2 showers, one shower the night before surgery, the second shower the morning of surgery before coming to the hospital.  · Use CHG only as told by your health care provider, and follow the instructions on the label.  · Use CHG solution while taking a shower. Follow these steps when using CHG solution (unless your health care provider gives you different instructions):  1. Start the shower.  2. Use your normal soap and shampoo to wash your face and hair.  3. Turn off the shower or move out of the shower stream.  4. Pour the CHG onto a clean washcloth. Do not use any type of brush or rough-edged sponge.  5. Starting at your neck, lather your body down to your toes. Make sure you:  6. Pay special attention to the part of your body where you will be having surgery. Scrub this area for at least 1 minute.  7. Use the full amount of CHG as directed. Usually, this is one half bottle for each shower.  8. Do not use CHG on your head or face. If the solution gets into your ears or eyes, rinse them well with water.  9. Avoid your genital area.  10. Avoid any areas of skin that have broken skin, cuts, or scrapes.  11. Scrub your back and under your arms. Make sure to wash skin folds.  12. Let the lather sit on your skin for 1-2 minutes or as long as told by your health care  provider.  13. Thoroughly rinse your entire body in the shower. Make sure that all body creases and crevices are rinsed well.  14. Dry off with a clean towel. Do not put any substances on your body afterward, such as powder, lotion, or perfume.  15. Put on clean clothes or pajamas.  16. If it is the night before your surgery, sleep in clean  sheets.    What are the risks?  Risks of using CHG include:  · A skin reaction.  · Hearing loss, if CHG gets in your ears.  · Eye injury, if CHG gets in your eyes and is not rinsed out.  · The CHG product catching fire.  Make sure that you avoid smoking and flames after applying CHG to your skin.  Do not use CHG:  · If you have a chlorhexidine allergy or have previously reacted to chlorhexidine.  · On babies younger than 2 months of age.      On the day of surgery, when you are taken to your room in Outpatient Surgery you will be given a CHG prepackaged cloth to wipe the site for your surgery.  How to use CHG prepackaged cloths  · Follow the instructions on the label.  · Use the CHG cloth on clean, dry skin. Follow these steps when using a CHG cloth (unless your health care provider gives you different instructions):  1. Using the CHG cloth, vigorously scrub the part of your body where you will be having surgery. Scrub using a back-and-forth motion for 3 minutes. The area on your body should be completely wet with CHG when you are finished scrubbing.  2. Do not rinse. Discard the cloth and let the area air-dry for 1 minute. Do not put any substances on your body afterward, such as powder, lotion, or perfume.  Contact a health care provider if:  · Your skin gets irritated after scrubbing.  · You have questions about using your solution or cloth.  Get help right away if:  · Your eyes become very red or swollen.  · Your eyes itch badly.  · Your skin itches badly and is red or swollen.  · Your hearing changes.  · You have trouble seeing.  · You have swelling or tingling in your mouth or throat.  · You have trouble breathing.  · You swallow any chlorhexidine.  Summary  · Chlorhexidine gluconate (CHG) is a germ-killing (antiseptic) solution that is used to clean the skin. Cleaning your skin with CHG before surgery helps lower the risk for infection after surgery.  · You may be given CHG to use at home. It may be in a  bottle or in a prepackaged cloth to use on your skin. Carefully follow your health care provider's instructions and the instructions on the product label.  · Do not use CHG if you have a chlorhexidine allergy.  · Contact your health care provider if your skin gets irritated after scrubbing.  This information is not intended to replace advice given to you by your health care provider. Make sure you discuss any questions you have with your health care provider.  Document Released: 09/11/2013 Document Revised: 11/15/2018 Document Reviewed: 11/15/2018  [employer] Interactive Patient Education © 2019 [employer]          PATIENT/FAMILY/RESPONSIBLE PARTY VERBALIZES UNDERSTANDING OF ABOVE EDUCATION.  COPY OF PAIN SCALE GIVEN AND REVIEWED WITH VERBALIZED UNDERSTANDING.

## 2021-03-25 ENCOUNTER — TRANSCRIBE ORDERS (OUTPATIENT)
Dept: ADMINISTRATIVE | Facility: HOSPITAL | Age: 51
End: 2021-03-25

## 2021-03-25 DIAGNOSIS — Z11.59 SCREENING FOR VIRAL DISEASE: Primary | ICD-10-CM

## 2021-03-25 LAB
QT INTERVAL: 388 MS
QTC INTERVAL: 430 MS

## 2021-03-26 ENCOUNTER — HOSPITAL ENCOUNTER (OUTPATIENT)
Dept: GENERAL RADIOLOGY | Facility: HOSPITAL | Age: 51
Discharge: HOME | End: 2021-03-26
Admitting: NURSE PRACTITIONER

## 2021-03-26 ENCOUNTER — TRANSCRIBE ORDERS (OUTPATIENT)
Dept: ADMINISTRATIVE | Facility: HOSPITAL | Age: 51
End: 2021-03-26

## 2021-03-26 DIAGNOSIS — Z01.818 ENCOUNTER FOR OTHER PREPROCEDURAL EXAMINATION: ICD-10-CM

## 2021-03-26 DIAGNOSIS — Z01.818 ENCOUNTER FOR OTHER PREPROCEDURAL EXAMINATION: Primary | ICD-10-CM

## 2021-03-26 PROCEDURE — 71046 X-RAY EXAM CHEST 2 VIEWS: CPT

## 2021-03-30 ENCOUNTER — LAB (OUTPATIENT)
Dept: LAB | Facility: HOSPITAL | Age: 51
End: 2021-03-30

## 2021-03-30 DIAGNOSIS — Z00.00 ENCOUNTER FOR GENERAL ADULT MEDICAL EXAMINATION WITHOUT ABNORMAL FINDINGS: Primary | ICD-10-CM

## 2021-03-30 LAB — SARS-COV-2 RNA RESP QL NAA+PROBE: NOT DETECTED

## 2021-03-30 PROCEDURE — C9803 HOPD COVID-19 SPEC COLLECT: HCPCS

## 2021-03-30 PROCEDURE — U0003 INFECTIOUS AGENT DETECTION BY NUCLEIC ACID (DNA OR RNA); SEVERE ACUTE RESPIRATORY SYNDROME CORONAVIRUS 2 (SARS-COV-2) (CORONAVIRUS DISEASE [COVID-19]), AMPLIFIED PROBE TECHNIQUE, MAKING USE OF HIGH THROUGHPUT TECHNOLOGIES AS DESCRIBED BY CMS-2020-01-R: HCPCS

## 2021-03-31 ENCOUNTER — HOSPITAL ENCOUNTER (OUTPATIENT)
Facility: HOSPITAL | Age: 51
Discharge: HOME | End: 2021-04-01
Attending: NEUROLOGICAL SURGERY | Admitting: NEUROLOGICAL SURGERY

## 2021-03-31 ENCOUNTER — ANESTHESIA (OUTPATIENT)
Dept: PERIOP | Facility: HOSPITAL | Age: 51
End: 2021-03-31

## 2021-03-31 ENCOUNTER — APPOINTMENT (OUTPATIENT)
Dept: GENERAL RADIOLOGY | Facility: HOSPITAL | Age: 51
End: 2021-03-31

## 2021-03-31 ENCOUNTER — ANESTHESIA EVENT (OUTPATIENT)
Dept: PERIOP | Facility: HOSPITAL | Age: 51
End: 2021-03-31

## 2021-03-31 DIAGNOSIS — M47.812 CERVICAL SPONDYLOSIS: ICD-10-CM

## 2021-03-31 DIAGNOSIS — M50.20 DISPLACEMENT OF CERVICAL INTERVERTEBRAL DISC WITHOUT MYELOPATHY: ICD-10-CM

## 2021-03-31 DIAGNOSIS — M54.12 CERVICAL RADICULOPATHY: ICD-10-CM

## 2021-03-31 DIAGNOSIS — Z78.9 DECREASED ACTIVITIES OF DAILY LIVING (ADL): ICD-10-CM

## 2021-03-31 LAB
ABO GROUP BLD: NORMAL
BLD GP AB SCN SERPL QL: NEGATIVE
RH BLD: NEGATIVE
T&S EXPIRATION DATE: NORMAL

## 2021-03-31 PROCEDURE — 25010000002 CEFAZOLIN PER 500 MG: Performed by: NURSE PRACTITIONER

## 2021-03-31 PROCEDURE — 86901 BLOOD TYPING SEROLOGIC RH(D): CPT | Performed by: NEUROLOGICAL SURGERY

## 2021-03-31 PROCEDURE — 25010000002 SUCCINYLCHOLINE PER 20 MG: Performed by: NURSE ANESTHETIST, CERTIFIED REGISTERED

## 2021-03-31 PROCEDURE — 76000 FLUOROSCOPY <1 HR PHYS/QHP: CPT

## 2021-03-31 PROCEDURE — C1713 ANCHOR/SCREW BN/BN,TIS/BN: HCPCS | Performed by: NEUROLOGICAL SURGERY

## 2021-03-31 PROCEDURE — 25010000002 CEFAZOLIN PER 500 MG: Performed by: NEUROLOGICAL SURGERY

## 2021-03-31 PROCEDURE — 22853 INSJ BIOMECHANICAL DEVICE: CPT | Performed by: NEUROLOGICAL SURGERY

## 2021-03-31 PROCEDURE — 88311 DECALCIFY TISSUE: CPT | Performed by: NEUROLOGICAL SURGERY

## 2021-03-31 PROCEDURE — 25010000003 CEFAZOLIN PER 500 MG: Performed by: NEUROLOGICAL SURGERY

## 2021-03-31 PROCEDURE — 86900 BLOOD TYPING SEROLOGIC ABO: CPT | Performed by: NEUROLOGICAL SURGERY

## 2021-03-31 PROCEDURE — 25010000002 FENTANYL CITRATE (PF) 250 MCG/5ML SOLUTION: Performed by: NURSE ANESTHETIST, CERTIFIED REGISTERED

## 2021-03-31 PROCEDURE — 94799 UNLISTED PULMONARY SVC/PX: CPT

## 2021-03-31 PROCEDURE — S0260 H&P FOR SURGERY: HCPCS | Performed by: NEUROLOGICAL SURGERY

## 2021-03-31 PROCEDURE — 72040 X-RAY EXAM NECK SPINE 2-3 VW: CPT

## 2021-03-31 PROCEDURE — 88304 TISSUE EXAM BY PATHOLOGIST: CPT | Performed by: NEUROLOGICAL SURGERY

## 2021-03-31 PROCEDURE — 22551 ARTHRD ANT NTRBDY CERVICAL: CPT | Performed by: NEUROLOGICAL SURGERY

## 2021-03-31 PROCEDURE — 22552 ARTHRD ANT NTRBD CERVICAL EA: CPT | Performed by: NEUROLOGICAL SURGERY

## 2021-03-31 PROCEDURE — 22846 INSERT SPINE FIXATION DEVICE: CPT | Performed by: NEUROLOGICAL SURGERY

## 2021-03-31 PROCEDURE — 86850 RBC ANTIBODY SCREEN: CPT | Performed by: NEUROLOGICAL SURGERY

## 2021-03-31 PROCEDURE — 25010000002 PROPOFOL 10 MG/ML EMULSION: Performed by: NURSE ANESTHETIST, CERTIFIED REGISTERED

## 2021-03-31 PROCEDURE — 25010000002 HYDROMORPHONE PER 4 MG: Performed by: ANESTHESIOLOGY

## 2021-03-31 DEVICE — INTERBODY FUSION DEVICE NANOLOCK SURFACE TECHNOLOGY 6 DEGREE SMALL 7MM
Type: IMPLANTABLE DEVICE | Site: SPINE CERVICAL | Status: FUNCTIONAL
Brand: ENDOSKELETON TC

## 2021-03-31 DEVICE — INTERBODY FUSION DEVICE NANOLOCK SURFACE TECHNOLOGY 6 DEGREE SMALL 8MM
Type: IMPLANTABLE DEVICE | Site: SPINE CERVICAL | Status: FUNCTIONAL
Brand: ENDOSKELETON TC

## 2021-03-31 DEVICE — KT HEMOST ABS SURGIFOAM PORCN 1GRAM: Type: IMPLANTABLE DEVICE | Site: SPINE CERVICAL | Status: FUNCTIONAL

## 2021-03-31 DEVICE — SCREW 7713515 ZEVO VAR SD 3.5MM X 15MM
Type: IMPLANTABLE DEVICE | Site: SPINE CERVICAL | Status: FUNCTIONAL
Brand: ZEVO™ ANTERIOR CERVICAL PLATE SYSTEM

## 2021-03-31 DEVICE — INTERBODY FUSION DEVICE NANOLOCK SURFACE TECHNOLOGY 6 DEGREE SMALL 9MM
Type: IMPLANTABLE DEVICE | Site: SPINE CERVICAL | Status: FUNCTIONAL
Brand: ENDOSKELETON TC

## 2021-03-31 DEVICE — HEMOST ABS SURGIFOAM SZ100 8X12 10MM: Type: IMPLANTABLE DEVICE | Site: SPINE CERVICAL | Status: FUNCTIONAL

## 2021-03-31 DEVICE — DBM T43103 2.5CC GRAFTON PUTTY
Type: IMPLANTABLE DEVICE | Site: SPINE CERVICAL | Status: FUNCTIONAL
Brand: GRAFTON®AND GRAFTON PLUS®DEMINERALIZED BONE MATRIX (DBM)

## 2021-03-31 RX ORDER — SODIUM CHLORIDE 9 MG/ML
INJECTION, SOLUTION INTRAVENOUS AS NEEDED
Status: DISCONTINUED | OUTPATIENT
Start: 2021-03-31 | End: 2021-03-31 | Stop reason: HOSPADM

## 2021-03-31 RX ORDER — SODIUM CHLORIDE 0.9 % (FLUSH) 0.9 %
3 SYRINGE (ML) INJECTION AS NEEDED
Status: DISCONTINUED | OUTPATIENT
Start: 2021-03-31 | End: 2021-03-31 | Stop reason: HOSPADM

## 2021-03-31 RX ORDER — SODIUM CHLORIDE, SODIUM LACTATE, POTASSIUM CHLORIDE, CALCIUM CHLORIDE 600; 310; 30; 20 MG/100ML; MG/100ML; MG/100ML; MG/100ML
1000 INJECTION, SOLUTION INTRAVENOUS CONTINUOUS
Status: DISCONTINUED | OUTPATIENT
Start: 2021-03-31 | End: 2021-03-31 | Stop reason: SDUPTHER

## 2021-03-31 RX ORDER — POLYETHYLENE GLYCOL 3350 17 G/17G
17 POWDER, FOR SOLUTION ORAL DAILY PRN
Status: DISCONTINUED | OUTPATIENT
Start: 2021-03-31 | End: 2021-04-01 | Stop reason: HOSPADM

## 2021-03-31 RX ORDER — FENTANYL CITRATE 50 UG/ML
INJECTION, SOLUTION INTRAMUSCULAR; INTRAVENOUS AS NEEDED
Status: DISCONTINUED | OUTPATIENT
Start: 2021-03-31 | End: 2021-03-31 | Stop reason: SURG

## 2021-03-31 RX ORDER — MONTELUKAST SODIUM 10 MG/1
10 TABLET ORAL NIGHTLY
Status: DISCONTINUED | OUTPATIENT
Start: 2021-03-31 | End: 2021-04-01 | Stop reason: HOSPADM

## 2021-03-31 RX ORDER — ACETAMINOPHEN 500 MG
1000 TABLET ORAL ONCE
Status: COMPLETED | OUTPATIENT
Start: 2021-03-31 | End: 2021-03-31

## 2021-03-31 RX ORDER — SERTRALINE HYDROCHLORIDE 100 MG/1
100 TABLET, FILM COATED ORAL NIGHTLY
Status: DISCONTINUED | OUTPATIENT
Start: 2021-03-31 | End: 2021-04-01 | Stop reason: HOSPADM

## 2021-03-31 RX ORDER — OXYCODONE AND ACETAMINOPHEN 10; 325 MG/1; MG/1
1 TABLET ORAL ONCE AS NEEDED
Status: DISCONTINUED | OUTPATIENT
Start: 2021-03-31 | End: 2021-03-31 | Stop reason: HOSPADM

## 2021-03-31 RX ORDER — LEVOTHYROXINE SODIUM 0.05 MG/1
50 TABLET ORAL
Status: DISCONTINUED | OUTPATIENT
Start: 2021-03-31 | End: 2021-04-01 | Stop reason: HOSPADM

## 2021-03-31 RX ORDER — LAMOTRIGINE 25 MG/1
25 TABLET ORAL NIGHTLY
Status: DISCONTINUED | OUTPATIENT
Start: 2021-03-31 | End: 2021-04-01 | Stop reason: HOSPADM

## 2021-03-31 RX ORDER — SODIUM CHLORIDE 0.9 % (FLUSH) 0.9 %
3-10 SYRINGE (ML) INJECTION AS NEEDED
Status: DISCONTINUED | OUTPATIENT
Start: 2021-03-31 | End: 2021-03-31 | Stop reason: HOSPADM

## 2021-03-31 RX ORDER — CYCLOBENZAPRINE HCL 10 MG
10 TABLET ORAL 3 TIMES DAILY PRN
Status: DISCONTINUED | OUTPATIENT
Start: 2021-03-31 | End: 2021-04-01 | Stop reason: HOSPADM

## 2021-03-31 RX ORDER — ACETAMINOPHEN 325 MG/1
650 TABLET ORAL EVERY 4 HOURS PRN
Status: DISCONTINUED | OUTPATIENT
Start: 2021-03-31 | End: 2021-04-01 | Stop reason: HOSPADM

## 2021-03-31 RX ORDER — NALOXONE HCL 0.4 MG/ML
0.4 VIAL (ML) INJECTION
Status: DISCONTINUED | OUTPATIENT
Start: 2021-03-31 | End: 2021-04-01 | Stop reason: HOSPADM

## 2021-03-31 RX ORDER — FAMOTIDINE 20 MG/1
40 TABLET, FILM COATED ORAL DAILY
Status: DISCONTINUED | OUTPATIENT
Start: 2021-04-01 | End: 2021-04-01 | Stop reason: HOSPADM

## 2021-03-31 RX ORDER — LABETALOL HYDROCHLORIDE 5 MG/ML
5 INJECTION, SOLUTION INTRAVENOUS
Status: DISCONTINUED | OUTPATIENT
Start: 2021-03-31 | End: 2021-03-31 | Stop reason: HOSPADM

## 2021-03-31 RX ORDER — LIDOCAINE HYDROCHLORIDE 10 MG/ML
0.5 INJECTION, SOLUTION EPIDURAL; INFILTRATION; INTRACAUDAL; PERINEURAL ONCE AS NEEDED
Status: DISCONTINUED | OUTPATIENT
Start: 2021-03-31 | End: 2021-03-31 | Stop reason: HOSPADM

## 2021-03-31 RX ORDER — NALOXONE HCL 0.4 MG/ML
0.04 VIAL (ML) INJECTION AS NEEDED
Status: DISCONTINUED | OUTPATIENT
Start: 2021-03-31 | End: 2021-03-31 | Stop reason: HOSPADM

## 2021-03-31 RX ORDER — PROPOFOL 10 MG/ML
VIAL (ML) INTRAVENOUS AS NEEDED
Status: DISCONTINUED | OUTPATIENT
Start: 2021-03-31 | End: 2021-03-31 | Stop reason: SURG

## 2021-03-31 RX ORDER — NEOSTIGMINE METHYLSULFATE 5 MG/5 ML
SYRINGE (ML) INTRAVENOUS AS NEEDED
Status: DISCONTINUED | OUTPATIENT
Start: 2021-03-31 | End: 2021-03-31

## 2021-03-31 RX ORDER — SODIUM CHLORIDE 0.9 % (FLUSH) 0.9 %
3 SYRINGE (ML) INJECTION EVERY 12 HOURS SCHEDULED
Status: DISCONTINUED | OUTPATIENT
Start: 2021-03-31 | End: 2021-03-31 | Stop reason: HOSPADM

## 2021-03-31 RX ORDER — HYDROMORPHONE HYDROCHLORIDE 1 MG/ML
0.5 INJECTION, SOLUTION INTRAMUSCULAR; INTRAVENOUS; SUBCUTANEOUS
Status: DISCONTINUED | OUTPATIENT
Start: 2021-03-31 | End: 2021-03-31 | Stop reason: HOSPADM

## 2021-03-31 RX ORDER — LISINOPRIL 10 MG/1
10 TABLET ORAL DAILY
Status: DISCONTINUED | OUTPATIENT
Start: 2021-03-31 | End: 2021-04-01 | Stop reason: HOSPADM

## 2021-03-31 RX ORDER — FAMOTIDINE 10 MG/ML
20 INJECTION, SOLUTION INTRAVENOUS
Status: COMPLETED | OUTPATIENT
Start: 2021-03-31 | End: 2021-03-31

## 2021-03-31 RX ORDER — BUPIVACAINE HCL/0.9 % NACL/PF 0.1 %
2 PLASTIC BAG, INJECTION (ML) EPIDURAL EVERY 8 HOURS SCHEDULED
Status: COMPLETED | OUTPATIENT
Start: 2021-03-31 | End: 2021-04-01

## 2021-03-31 RX ORDER — SODIUM CHLORIDE 9 MG/ML
75 INJECTION, SOLUTION INTRAVENOUS CONTINUOUS
Status: DISCONTINUED | OUTPATIENT
Start: 2021-03-31 | End: 2021-04-01 | Stop reason: HOSPADM

## 2021-03-31 RX ORDER — SUCCINYLCHOLINE CHLORIDE 20 MG/ML
INJECTION INTRAMUSCULAR; INTRAVENOUS AS NEEDED
Status: DISCONTINUED | OUTPATIENT
Start: 2021-03-31 | End: 2021-03-31 | Stop reason: SURG

## 2021-03-31 RX ORDER — BUPIVACAINE HCL/0.9 % NACL/PF 0.1 %
2 PLASTIC BAG, INJECTION (ML) EPIDURAL ONCE
Status: COMPLETED | OUTPATIENT
Start: 2021-03-31 | End: 2021-03-31

## 2021-03-31 RX ORDER — MORPHINE SULFATE 2 MG/ML
1 INJECTION, SOLUTION INTRAMUSCULAR; INTRAVENOUS EVERY 4 HOURS PRN
Status: DISCONTINUED | OUTPATIENT
Start: 2021-03-31 | End: 2021-04-01 | Stop reason: HOSPADM

## 2021-03-31 RX ORDER — MAGNESIUM HYDROXIDE 1200 MG/15ML
LIQUID ORAL AS NEEDED
Status: DISCONTINUED | OUTPATIENT
Start: 2021-03-31 | End: 2021-03-31 | Stop reason: HOSPADM

## 2021-03-31 RX ORDER — SODIUM CHLORIDE 0.9 % (FLUSH) 0.9 %
10 SYRINGE (ML) INJECTION AS NEEDED
Status: DISCONTINUED | OUTPATIENT
Start: 2021-03-31 | End: 2021-04-01 | Stop reason: HOSPADM

## 2021-03-31 RX ORDER — SODIUM CHLORIDE 0.9 % (FLUSH) 0.9 %
3 SYRINGE (ML) INJECTION EVERY 12 HOURS SCHEDULED
Status: DISCONTINUED | OUTPATIENT
Start: 2021-03-31 | End: 2021-04-01 | Stop reason: HOSPADM

## 2021-03-31 RX ORDER — ONDANSETRON 2 MG/ML
4 INJECTION INTRAMUSCULAR; INTRAVENOUS AS NEEDED
Status: DISCONTINUED | OUTPATIENT
Start: 2021-03-31 | End: 2021-03-31 | Stop reason: HOSPADM

## 2021-03-31 RX ORDER — HYDROCODONE BITARTRATE AND ACETAMINOPHEN 7.5; 325 MG/1; MG/1
1 TABLET ORAL EVERY 4 HOURS PRN
Status: DISCONTINUED | OUTPATIENT
Start: 2021-03-31 | End: 2021-04-01 | Stop reason: HOSPADM

## 2021-03-31 RX ORDER — METOCLOPRAMIDE 10 MG/1
10 TABLET ORAL 3 TIMES DAILY PRN
Status: DISCONTINUED | OUTPATIENT
Start: 2021-03-31 | End: 2021-04-01 | Stop reason: HOSPADM

## 2021-03-31 RX ORDER — SUCRALFATE 1 G/1
1 TABLET ORAL 4 TIMES DAILY
Status: DISCONTINUED | OUTPATIENT
Start: 2021-03-31 | End: 2021-04-01 | Stop reason: HOSPADM

## 2021-03-31 RX ORDER — SODIUM CHLORIDE 0.9 % (FLUSH) 0.9 %
10 SYRINGE (ML) INJECTION AS NEEDED
Status: DISCONTINUED | OUTPATIENT
Start: 2021-03-31 | End: 2021-03-31 | Stop reason: HOSPADM

## 2021-03-31 RX ORDER — IPRATROPIUM BROMIDE AND ALBUTEROL SULFATE 2.5; .5 MG/3ML; MG/3ML
3 SOLUTION RESPIRATORY (INHALATION) EVERY 4 HOURS PRN
Status: DISCONTINUED | OUTPATIENT
Start: 2021-03-31 | End: 2021-04-01 | Stop reason: HOSPADM

## 2021-03-31 RX ORDER — BISACODYL 10 MG
10 SUPPOSITORY, RECTAL RECTAL DAILY PRN
Status: DISCONTINUED | OUTPATIENT
Start: 2021-03-31 | End: 2021-04-01 | Stop reason: HOSPADM

## 2021-03-31 RX ORDER — FLUMAZENIL 0.1 MG/ML
0.2 INJECTION INTRAVENOUS AS NEEDED
Status: DISCONTINUED | OUTPATIENT
Start: 2021-03-31 | End: 2021-03-31 | Stop reason: HOSPADM

## 2021-03-31 RX ORDER — FENTANYL CITRATE 50 UG/ML
25 INJECTION, SOLUTION INTRAMUSCULAR; INTRAVENOUS
Status: DISCONTINUED | OUTPATIENT
Start: 2021-03-31 | End: 2021-03-31 | Stop reason: HOSPADM

## 2021-03-31 RX ORDER — DOCUSATE SODIUM 100 MG/1
100 CAPSULE, LIQUID FILLED ORAL 2 TIMES DAILY
Status: DISCONTINUED | OUTPATIENT
Start: 2021-03-31 | End: 2021-04-01 | Stop reason: HOSPADM

## 2021-03-31 RX ORDER — ONDANSETRON 2 MG/ML
4 INJECTION INTRAMUSCULAR; INTRAVENOUS EVERY 6 HOURS PRN
Status: DISCONTINUED | OUTPATIENT
Start: 2021-03-31 | End: 2021-04-01 | Stop reason: HOSPADM

## 2021-03-31 RX ORDER — SODIUM CHLORIDE, SODIUM LACTATE, POTASSIUM CHLORIDE, CALCIUM CHLORIDE 600; 310; 30; 20 MG/100ML; MG/100ML; MG/100ML; MG/100ML
100 INJECTION, SOLUTION INTRAVENOUS CONTINUOUS
Status: DISCONTINUED | OUTPATIENT
Start: 2021-03-31 | End: 2021-03-31 | Stop reason: SDUPTHER

## 2021-03-31 RX ORDER — LIDOCAINE HYDROCHLORIDE 20 MG/ML
INJECTION, SOLUTION EPIDURAL; INFILTRATION; INTRACAUDAL; PERINEURAL AS NEEDED
Status: DISCONTINUED | OUTPATIENT
Start: 2021-03-31 | End: 2021-03-31 | Stop reason: SURG

## 2021-03-31 RX ADMIN — SUCRALFATE 1 G: 1 TABLET ORAL at 16:15

## 2021-03-31 RX ADMIN — CEFAZOLIN SODIUM 2 G: 10 INJECTION, POWDER, FOR SOLUTION INTRAVENOUS at 14:17

## 2021-03-31 RX ADMIN — LIDOCAINE HYDROCHLORIDE 100 MG: 20 INJECTION, SOLUTION EPIDURAL; INFILTRATION; INTRACAUDAL; PERINEURAL at 07:17

## 2021-03-31 RX ADMIN — CYCLOBENZAPRINE 10 MG: 10 TABLET, FILM COATED ORAL at 21:39

## 2021-03-31 RX ADMIN — HYDROMORPHONE HYDROCHLORIDE 0.5 MG: 1 INJECTION, SOLUTION INTRAMUSCULAR; INTRAVENOUS; SUBCUTANEOUS at 11:12

## 2021-03-31 RX ADMIN — SODIUM CHLORIDE, POTASSIUM CHLORIDE, SODIUM LACTATE AND CALCIUM CHLORIDE: 600; 310; 30; 20 INJECTION, SOLUTION INTRAVENOUS at 07:15

## 2021-03-31 RX ADMIN — SODIUM CHLORIDE, POTASSIUM CHLORIDE, SODIUM LACTATE AND CALCIUM CHLORIDE 1000 ML: 600; 310; 30; 20 INJECTION, SOLUTION INTRAVENOUS at 06:32

## 2021-03-31 RX ADMIN — ACETAMINOPHEN 1000 MG: 500 TABLET, FILM COATED ORAL at 06:52

## 2021-03-31 RX ADMIN — LEVOTHYROXINE SODIUM 50 MCG: 50 TABLET ORAL at 13:04

## 2021-03-31 RX ADMIN — SERTRALINE 100 MG: 100 TABLET, FILM COATED ORAL at 21:39

## 2021-03-31 RX ADMIN — SODIUM CHLORIDE, PRESERVATIVE FREE 3 ML: 5 INJECTION INTRAVENOUS at 13:03

## 2021-03-31 RX ADMIN — SODIUM CHLORIDE, POTASSIUM CHLORIDE, SODIUM LACTATE AND CALCIUM CHLORIDE: 600; 310; 30; 20 INJECTION, SOLUTION INTRAVENOUS at 09:22

## 2021-03-31 RX ADMIN — DOCUSATE SODIUM 100 MG: 100 CAPSULE ORAL at 21:39

## 2021-03-31 RX ADMIN — DOCUSATE SODIUM 100 MG: 100 CAPSULE ORAL at 13:03

## 2021-03-31 RX ADMIN — LISINOPRIL 10 MG: 10 TABLET ORAL at 13:03

## 2021-03-31 RX ADMIN — SODIUM CHLORIDE, PRESERVATIVE FREE 3 ML: 5 INJECTION INTRAVENOUS at 21:39

## 2021-03-31 RX ADMIN — FAMOTIDINE 20 MG: 10 INJECTION INTRAVENOUS at 06:52

## 2021-03-31 RX ADMIN — Medication 200 MCG/KG/MIN: at 07:20

## 2021-03-31 RX ADMIN — SODIUM CHLORIDE 75 ML/HR: 9 INJECTION, SOLUTION INTRAVENOUS at 12:30

## 2021-03-31 RX ADMIN — HYDROCODONE BITARTRATE AND ACETAMINOPHEN 1 TABLET: 7.5; 325 TABLET ORAL at 18:10

## 2021-03-31 RX ADMIN — SUCCINYLCHOLINE CHLORIDE 140 MG: 20 INJECTION, SOLUTION INTRAMUSCULAR; INTRAVENOUS at 07:17

## 2021-03-31 RX ADMIN — Medication 200 MG: at 07:17

## 2021-03-31 RX ADMIN — MONTELUKAST SODIUM 10 MG: 10 TABLET, FILM COATED ORAL at 21:39

## 2021-03-31 RX ADMIN — FENTANYL CITRATE 100 MCG: 50 INJECTION, SOLUTION INTRAMUSCULAR; INTRAVENOUS at 07:21

## 2021-03-31 RX ADMIN — FENTANYL CITRATE 150 MCG: 50 INJECTION, SOLUTION INTRAMUSCULAR; INTRAVENOUS at 07:17

## 2021-03-31 RX ADMIN — SUCRALFATE 1 G: 1 TABLET ORAL at 21:39

## 2021-03-31 RX ADMIN — Medication 3 G: at 07:17

## 2021-03-31 RX ADMIN — SODIUM CHLORIDE, POTASSIUM CHLORIDE, SODIUM LACTATE AND CALCIUM CHLORIDE 1000 ML: 600; 310; 30; 20 INJECTION, SOLUTION INTRAVENOUS at 06:31

## 2021-03-31 RX ADMIN — LAMOTRIGINE 25 MG: 25 TABLET ORAL at 21:39

## 2021-03-31 RX ADMIN — FENTANYL CITRATE 250 MCG: 50 INJECTION, SOLUTION INTRAMUSCULAR; INTRAVENOUS at 07:27

## 2021-03-31 RX ADMIN — CEFAZOLIN SODIUM 2 G: 10 INJECTION, POWDER, FOR SOLUTION INTRAVENOUS at 21:38

## 2021-03-31 NOTE — ANESTHESIA PROCEDURE NOTES
Airway  Urgency: elective    Date/Time: 3/31/2021 7:30 AM  Airway not difficult    General Information and Staff    Patient location during procedure: OR  CRNA: Jonathan Malhotra CRNA    Indications and Patient Condition  Indications for airway management: airway protection    Preoxygenated: yes  Mask difficulty assessment: 1 - vent by mask    Final Airway Details  Final airway type: endotracheal airway      Successful airway: ETT    Successful intubation technique: video laryngoscopy  Facilitating devices/methods: Bougie  Endotracheal tube insertion site: oral  Blade: Ponce  Blade size: 4 (3.5)  ETT size (mm): 8.0  Cormack-Lehane Classification: grade I - full view of glottis  Placement verified by: chest auscultation and capnometry   Measured from: lips  ETT/EBT  to lips (cm): 24  Number of attempts at approach: 1  Assessment: lips, teeth, and gum same as pre-op and atraumatic intubation

## 2021-03-31 NOTE — ANESTHESIA PREPROCEDURE EVALUATION
Anesthesia Evaluation     no history of anesthetic complications:  NPO Solid Status: > 8 hours  NPO Liquid Status: > 8 hours           Airway   Mallampati: III  TM distance: >3 FB  Neck ROM: full  Dental      Pulmonary    (+) a smoker (quit 1994) Former, asthma,sleep apnea on CPAP,   Cardiovascular   Exercise tolerance: good (4-7 METS)    ECG reviewed    (+) hypertension,   (-) CAD      Neuro/Psych  (+) numbness, psychiatric history PTSD,     (-) seizures, TIA, CVA  GI/Hepatic/Renal/Endo    (+) morbid obesity, GERD, PUD,    (-) liver disease, no renal disease, diabetes    ROS Comment: thrush    Musculoskeletal     Abdominal    Substance History      OB/GYN          Other   arthritis (Rheumatoid arthritis),                      Anesthesia Plan    ASA 3     general     intravenous induction     Anesthetic plan, all risks, benefits, and alternatives have been provided, discussed and informed consent has been obtained with: patient.

## 2021-03-31 NOTE — ANESTHESIA POSTPROCEDURE EVALUATION
Patient: Jose Luis Ziegler    Procedure Summary     Date: 03/31/21 Room / Location: [facility] OR [address] [place] [address] PAD OR    Anesthesia Start: 0707 Anesthesia Stop: 1043    Procedure: CERVICAL DISCECTOMY ANTERIOR INTERBODY  FUSION, ANTERIOR CERVICAL INSTRUMENTATION C4-5,5-6,6-7 (N/A Spine Cervical) Diagnosis:       Displacement of cervical intervertebral disc without myelopathy      Cervical spondylosis      Cervical radiculopathy      (Displacement of cervical intervertebral disc without myelopathy [M50.20])      (Cervical spondylosis [M47.812])      (Cervical radiculopathy [M54.12])    Surgeons: Antony Elmore MD Provider: Jonathan Malhotra CRNA    Anesthesia Type: general ASA Status: 3          Anesthesia Type: general    Vitals  Vitals Value Taken Time   /80 03/31/21 1155   Temp 97.4 °F (36.3 °C) 03/31/21 1155   Pulse 81 03/31/21 1155   Resp 18 03/31/21 1155   SpO2 96 % 03/31/21 1155           Post Anesthesia Care and Evaluation    Patient location during evaluation: PACU  Patient participation: complete - patient participated  Level of consciousness: awake and alert  Pain management: adequate  Airway patency: patent  Anesthetic complications: No anesthetic complications    Cardiovascular status: acceptable  Respiratory status: acceptable  Hydration status: acceptable    Comments: Blood pressure 118/72, pulse 78, temperature 97.8 °F (36.6 °C), temperature source Oral, resp. rate 17, SpO2 95 %.    Pt discharged from PACU based on sergio score >8

## 2021-04-01 VITALS
SYSTOLIC BLOOD PRESSURE: 122 MMHG | HEIGHT: 73 IN | DIASTOLIC BLOOD PRESSURE: 74 MMHG | RESPIRATION RATE: 18 BRPM | TEMPERATURE: 97.6 F | OXYGEN SATURATION: 96 % | WEIGHT: 315 LBS | BODY MASS INDEX: 41.75 KG/M2 | HEART RATE: 90 BPM

## 2021-04-01 LAB
ANION GAP SERPL CALCULATED.3IONS-SCNC: 8 MMOL/L (ref 5–15)
BASOPHILS # BLD AUTO: 0.03 10*3/MM3 (ref 0–0.2)
BASOPHILS NFR BLD AUTO: 0.3 % (ref 0–1.5)
BUN SERPL-MCNC: 13 MG/DL (ref 6–20)
BUN/CREAT SERPL: 23.6 (ref 7–25)
CALCIUM SPEC-SCNC: 8.7 MG/DL (ref 8.6–10.5)
CHLORIDE SERPL-SCNC: 103 MMOL/L (ref 98–107)
CO2 SERPL-SCNC: 29 MMOL/L (ref 22–29)
CREAT SERPL-MCNC: 0.55 MG/DL (ref 0.76–1.27)
DEPRECATED RDW RBC AUTO: 49.6 FL (ref 37–54)
EOSINOPHIL # BLD AUTO: 0.04 10*3/MM3 (ref 0–0.4)
EOSINOPHIL NFR BLD AUTO: 0.3 % (ref 0.3–6.2)
ERYTHROCYTE [DISTWIDTH] IN BLOOD BY AUTOMATED COUNT: 15.4 % (ref 12.3–15.4)
GFR SERPL CREATININE-BSD FRML MDRD: >150 ML/MIN/1.73
GLUCOSE SERPL-MCNC: 117 MG/DL (ref 65–99)
HCT VFR BLD AUTO: 37.4 % (ref 37.5–51)
HGB BLD-MCNC: 12.4 G/DL (ref 13–17.7)
IMM GRANULOCYTES # BLD AUTO: 0.03 10*3/MM3 (ref 0–0.05)
IMM GRANULOCYTES NFR BLD AUTO: 0.3 % (ref 0–0.5)
LAB AP CASE REPORT: NORMAL
LYMPHOCYTES # BLD AUTO: 1.69 10*3/MM3 (ref 0.7–3.1)
LYMPHOCYTES NFR BLD AUTO: 14.5 % (ref 19.6–45.3)
MCH RBC QN AUTO: 29.3 PG (ref 26.6–33)
MCHC RBC AUTO-ENTMCNC: 33.2 G/DL (ref 31.5–35.7)
MCV RBC AUTO: 88.4 FL (ref 79–97)
MONOCYTES # BLD AUTO: 0.85 10*3/MM3 (ref 0.1–0.9)
MONOCYTES NFR BLD AUTO: 7.3 % (ref 5–12)
NEUTROPHILS NFR BLD AUTO: 77.3 % (ref 42.7–76)
NEUTROPHILS NFR BLD AUTO: 9 10*3/MM3 (ref 1.7–7)
NRBC BLD AUTO-RTO: 0 /100 WBC (ref 0–0.2)
PATH REPORT.FINAL DX SPEC: NORMAL
PATH REPORT.GROSS SPEC: NORMAL
PLATELET # BLD AUTO: 247 10*3/MM3 (ref 140–450)
PMV BLD AUTO: 10.8 FL (ref 6–12)
POTASSIUM SERPL-SCNC: 4.1 MMOL/L (ref 3.5–5.2)
RBC # BLD AUTO: 4.23 10*6/MM3 (ref 4.14–5.8)
SODIUM SERPL-SCNC: 140 MMOL/L (ref 136–145)
WBC # BLD AUTO: 11.64 10*3/MM3 (ref 3.4–10.8)

## 2021-04-01 PROCEDURE — 97161 PT EVAL LOW COMPLEX 20 MIN: CPT

## 2021-04-01 PROCEDURE — 25010000002 CEFAZOLIN PER 500 MG: Performed by: NURSE PRACTITIONER

## 2021-04-01 PROCEDURE — 97165 OT EVAL LOW COMPLEX 30 MIN: CPT

## 2021-04-01 PROCEDURE — 99024 POSTOP FOLLOW-UP VISIT: CPT | Performed by: NURSE PRACTITIONER

## 2021-04-01 PROCEDURE — 94799 UNLISTED PULMONARY SVC/PX: CPT

## 2021-04-01 PROCEDURE — 80048 BASIC METABOLIC PNL TOTAL CA: CPT | Performed by: NURSE PRACTITIONER

## 2021-04-01 PROCEDURE — 85025 COMPLETE CBC W/AUTO DIFF WBC: CPT | Performed by: NURSE PRACTITIONER

## 2021-04-01 RX ORDER — HYDROCODONE BITARTRATE AND ACETAMINOPHEN 7.5; 325 MG/1; MG/1
1 TABLET ORAL EVERY 6 HOURS PRN
Qty: 120 TABLET | Refills: 0 | Status: SHIPPED | OUTPATIENT
Start: 2021-04-01 | End: 2021-05-01

## 2021-04-01 RX ADMIN — SUCRALFATE 1 G: 1 TABLET ORAL at 11:20

## 2021-04-01 RX ADMIN — LEVOTHYROXINE SODIUM 50 MCG: 50 TABLET ORAL at 09:28

## 2021-04-01 RX ADMIN — SUCRALFATE 1 G: 1 TABLET ORAL at 05:12

## 2021-04-01 RX ADMIN — HYDROCODONE BITARTRATE AND ACETAMINOPHEN 1 TABLET: 7.5; 325 TABLET ORAL at 00:31

## 2021-04-01 RX ADMIN — HYDROCODONE BITARTRATE AND ACETAMINOPHEN 1 TABLET: 7.5; 325 TABLET ORAL at 09:29

## 2021-04-01 RX ADMIN — FAMOTIDINE 40 MG: 20 TABLET, FILM COATED ORAL at 09:29

## 2021-04-01 RX ADMIN — CEFAZOLIN SODIUM 2 G: 10 INJECTION, POWDER, FOR SOLUTION INTRAVENOUS at 05:12

## 2021-04-01 RX ADMIN — DOCUSATE SODIUM 100 MG: 100 CAPSULE ORAL at 09:29

## 2021-04-01 RX ADMIN — CYCLOBENZAPRINE 10 MG: 10 TABLET, FILM COATED ORAL at 16:08

## 2021-04-01 RX ADMIN — LISINOPRIL 10 MG: 10 TABLET ORAL at 09:29

## 2021-04-01 RX ADMIN — SODIUM CHLORIDE, PRESERVATIVE FREE 3 ML: 5 INJECTION INTRAVENOUS at 09:30

## 2021-04-01 NOTE — PLAN OF CARE
"Goal Outcome Evaluation:  Plan of Care Reviewed With: patient  Progress: improving  Outcome Summary: Rating pain 4-7/10.  Norco given for pain and pt has slept.  voiding well.  No neurovascular changes.  Collar removed Q4 to assess neck then reapplied.  Drsg CDI.  No c/o N/T.  Stands well at bedside to use urinal.  Gait appears steady.  On cont pulse ox and capnography, except when using home cpap he is only on cont pulse ox.  When asked if his throat is sore he will say, \"No my esophagus is sore.\"  Swallowing pills without difficulty.  "

## 2021-04-01 NOTE — DISCHARGE SUMMARY
Date of Discharge:  4/1/2021    Discharge Diagnosis: Displacement of cervical intervertebral disc without myelopathy [M50.20]  Cervical spondylosis [M47.812]  Cervical radiculopathy [M54.12]    Presenting Problem/History of Present Illness  Displacement of cervical intervertebral disc without myelopathy [M50.20]  Cervical spondylosis [M47.812]  Cervical radiculopathy [M54.12]       Hospital Course  Patient is a 51 y.o. male presented with Displacement of cervical intervertebral disc without myelopathy [M50.20]  Cervical spondylosis [M47.812]  Cervical radiculopathy [M54.12].  The patient has been through all manner of conservative care without any meaningful improvement. The patient went to the operating room on March 31, 2021 for a C4-5, 5-6, 6-7 ACDF   the patient tolerated procedure well.  Currently the patient is ambulating.  The patient is tolerating p.o.  The patient is voiding spontaneously.  It is felt that at this time the patient is stable and suitable to be discharged home.  See orders for discharge instructions and restrictions.  The patient can take the dressing off in 72 hours and can get the wound wet at that time.  The patient is to clean the wound daily with soap and water.  They are to call the office with any drainage from the incision or worsening pain.  He is to wear the cervical collar on at all times.  We will follow-up with him in the office in 3 weeks.    Procedures Performed  Procedure(s):  CERVICAL DISCECTOMY ANTERIOR INTERBODY  FUSION, ANTERIOR CERVICAL INSTRUMENTATION C4-5,5-6,6-7       Consults:   Consults     No orders found for last 30 day(s).            Condition on Discharge: Stable    Vital Signs  Temp:  [97.6 °F (36.4 °C)-98.4 °F (36.9 °C)] 97.6 °F (36.4 °C)  Heart Rate:  [] 90  Resp:  [16-18] 18  BP: (120-134)/(64-74) 122/74    Physical Exam:   Physical Exam  Constitutional:       Appearance: He is well-developed.   HENT:      Head: Normocephalic.   Eyes:      Pupils:  Pupils are equal, round, and reactive to light.   Pulmonary:      Effort: Pulmonary effort is normal.   Musculoskeletal:         General: Normal range of motion.      Cervical back: Normal range of motion.      Comments: Neck pain   Skin:     General: Skin is warm.   Neurological:      Mental Status: He is alert and oriented to person, place, and time.      GCS: GCS eye subscore is 4. GCS verbal subscore is 5. GCS motor subscore is 6.      Cranial Nerves: No cranial nerve deficit.      Sensory: No sensory deficit.      Gait: Gait normal.      Deep Tendon Reflexes: Reflexes are normal and symmetric.   Psychiatric:         Speech: Speech normal.         Behavior: Behavior normal.         Thought Content: Thought content normal.          Neurologic Exam     Mental Status   Oriented to person, place, and time.   Speech: speech is normal     Cranial Nerves     CN III, IV, VI   Pupils are equal, round, and reactive to light.         Discharge Disposition  Home or Self Care    Discharge Medications     Discharge Medications      New Medications      Instructions Start Date   HYDROcodone-acetaminophen 7.5-325 MG per tablet  Commonly known as: NORCO   1 tablet, Oral, Every 6 Hours PRN         Continue These Medications      Instructions Start Date   acetaminophen 500 MG tablet  Commonly known as: TYLENOL   1,000 mg, Oral, 2 times daily      albuterol sulfate  (90 Base) MCG/ACT inhaler  Commonly known as: PROVENTIL HFA;VENTOLIN HFA;PROAIR HFA   1-2 puffs, Inhalation, Every 6 Hours, prn      aspirin 81 MG EC tablet   81 mg, Oral, Daily      azelastine 0.1 % nasal spray  Commonly known as: ASTELIN   1 spray, Nasal, Daily, prn      Breo Ellipta 200-25 MCG/INH inhaler  Generic drug: Fluticasone Furoate-Vilanterol   1 puff, Inhalation, Nightly      famotidine 40 MG tablet  Commonly known as: PEPCID   40 mg, Oral, Daily      fish oil 1000 MG capsule capsule   Oral, Daily With Breakfast      folic acid 1 MG tablet  Commonly  known as: FOLVITE   1 mg, Oral, Daily      ipratropium-albuterol 0.5-2.5 mg/3 ml nebulizer  Commonly known as: DUO-NEB   3 mL, Nebulization, Every 4 Hours PRN      lamoTRIgine 25 MG tablet  Commonly known as: LaMICtal   Every Night at Bedtime      levothyroxine 50 MCG tablet  Commonly known as: SYNTHROID, LEVOTHROID   50 mcg, Oral, Daily      lisinopril 10 MG tablet  Commonly known as: PRINIVIL,ZESTRIL   10 mg, Oral, Daily      MAGNESIUM OXIDE -MG SUPPLEMENT PO   1 tablet, Oral, Daily      methotrexate 2.5 MG tablet   Oral, Weekly, Takes on Sunday.       metoclopramide 10 MG tablet  Commonly known as: REGLAN   10 mg, Oral, 3 Times Daily PRN      mometasone 50 MCG/ACT nasal spray  Commonly known as: NASONEX   2 sprays, Nasal, Daily      montelukast 10 MG tablet  Commonly known as: SINGULAIR   Nightly      sertraline 50 MG tablet  Commonly known as: ZOLOFT   100 mg, Oral, Nightly      sucralfate 1 g tablet  Commonly known as: CARAFATE   1 g, Oral, 4 Times Daily      SUPER CALCIUM PO   1 tablet, Oral, Daily      Vitamin C 500 MG chewable tablet   Oral, Daily      vitamin D3 125 MCG (5000 UT) capsule capsule   5,000 Units, Oral, 2 Times Daily      VITAMIN K2 PO   1 tablet, Oral, Daily             Discharge Diet:   Diet Instructions     Diet: Regular; Thin      Discharge Diet: Regular    Fluid Consistency: Thin          Activity at Discharge:   Activity Instructions     Other Instructions (Specify)      Activity Instructions: No strenuous activity, no driving, wear cervical collar at all times          Follow-up Appointments  No future appointments.  Additional Instructions for the Follow-ups that You Need to Schedule     Call MD With Problems / Concerns   As directed      Instructions: Worsening neck or arm pain, drainage from wound, fever, difficulty breathing or swallowing.    Order Comments: Instructions: Worsening neck or arm pain, drainage from wound, fever, difficulty breathing or swallowing.          Discharge  Follow-up with Specialty: sagar colon np; 3 Weeks   As directed      Specialty: sagar colon np    Follow Up: 3 Weeks               Test Results Pending at Discharge       SOFIA Puri  04/01/21  15:54 CDT    Time: Discharge 20 min

## 2021-04-01 NOTE — THERAPY EVALUATION
Patient Name: Jose Luis Ziegler  : 1970    MRN: [id number]                              Today's Date: 2021       Admit Date: 3/31/2021    Visit Dx:     ICD-10-CM ICD-9-CM   1. Displacement of cervical intervertebral disc without myelopathy  M50.20 722.0   2. Cervical spondylosis  M47.812 721.0   3. Cervical radiculopathy  M54.12 723.4   4. Decreased activities of daily living (ADL)  Z78.9 V49.89     Patient Active Problem List   Diagnosis   • Displacement of cervical intervertebral disc without myelopathy   • Weakness   • Right carpal tunnel syndrome   • Cervical spondylosis   • Body mass index (BMI) 45.0-49.9, adult   • Non-smoker   • Left carpal tunnel syndrome   • Cervical radiculopathy     Past Medical History:   Diagnosis Date   • Anemia    • Arthritis    • Asthma    • Candida infection    • Claustrophobia    • Cubital tunnel syndrome     BUE   • Fibromyalgia    • Gastritis    • GERD (gastroesophageal reflux disease)    • Gluten intolerance    • Gluten intolerance    • Hernia    • Hypertension    • Hypothyroidism    • OCD (obsessive compulsive disorder)    • OCD (obsessive compulsive disorder)    • Osteoarthritis    • Peptic ulcer     AND DUODENAL   • PTSD (post-traumatic stress disorder)    • Reflux gastritis    • Rheumatoid arthritis (CMS/HCC)    • Sleep apnea     uses a c-pap   • Spinal stenosis     injections in cervical area approx every 3 months.    • Thrush      Past Surgical History:   Procedure Laterality Date   • ANTERIOR CERVICAL DISCECTOMY W/ FUSION N/A 3/31/2021    Procedure: CERVICAL DISCECTOMY ANTERIOR INTERBODY  FUSION, ANTERIOR CERVICAL INSTRUMENTATION C4-5,5-6,6-7;  Surgeon: Antony Elmore MD;  Location:  [facility] OR;  Service: Neurosurgery;  Laterality: N/A;   • CARPAL TUNNEL RELEASE Left    • CARPAL TUNNEL RELEASE Right 2018    Procedure: RIGHT CARPAL TUNNEL RELEASE;  Surgeon: Derrell Pritchard MD;  Location:  [facility] OR;  Service:    • CHOLECYSTECTOMY     • LIPOMA  EXCISION     • ROTATOR CUFF REPAIR Right    • UMBILICAL HERNIA REPAIR      with mesh   • VASECTOMY  2002     General Information     Row Name 04/01/21 0810          OT Time and Intention    Document Type  -- s/p 3/31 C4-7 Ant discectomy, Ant interbody fusion, partial corpectomies  -     Mode of Treatment  occupational therapy  -     Row Name 04/01/21 0810          General Information    Patient Profile Reviewed  yes  -CS     Prior Level of Function  independent:;all household mobility;ADL's;dressing;bathing  -CS     Existing Precautions/Restrictions  fall;spinal [place] collar on at all times  -CS     Barriers to Rehab  none identified  -     Row Name 04/01/21 0810          Living Environment    Lives With  spouse  -     Row Name 04/01/21 0810          Home Main Entrance    Number of Stairs, Main Entrance  -- ramp  -     Row Name 04/01/21 0810          Cognition    Orientation Status (Cognition)  oriented x 4  -     Row Name 04/01/21 0810          Safety Issues, Functional Mobility    Impairments Affecting Function (Mobility)  pain  -CS       User Key  (r) = Recorded By, (t) = Taken By, (c) = Cosigned By    Initials Name Provider Type    CS Geeta Manning S, OTR/L, CNT Occupational Therapist          Mobility/ADL's     Row Name 04/01/21 0810          Bed Mobility    Bed Mobility  supine-sit  -     Supine-Sit [place] (Bed Mobility)  supervision  -     Assistive Device (Bed Mobility)  head of bed elevated  -     Row Name 04/01/21 0810          Transfers    Sit-Stand [place] (Transfers)  supervision  -     Row Name 04/01/21 0810          Functional Mobility    Functional Mobility- Ind. Level  supervision required;independent  -     Functional Mobility- Comment  Pt walked in hallway and back to bedside chair  -     Row Name 04/01/21 0810          Activities of Daily Living    BADL Assessment/Intervention  toileting  -     Row Name 04/01/21 0810          Toileting Assessment/Training     [place] Level (Toileting)  toileting skills;independent  -CS     Assistive Devices (Toileting)  commode  -CS     Position (Toileting)  unsupported sitting;unsupported standing  -CS       User Key  (r) = Recorded By, (t) = Taken By, (c) = Cosigned By    Initials Name Provider Type    CS Geeta Manning, OTR/L, MELISSA Occupational Therapist        Obj/Interventions     Row Name 04/01/21 0900          Sensory Assessment (Somatosensory)    Sensory Assessment (Somatosensory)  -- light touch and localization intact  -CS     Sensory Subjective Reports  -- diminished sensation noted in R and L 4th and 5th digits, however he reports improvement since sx  -CS     [facility] Name 04/01/21 0900          Vision Assessment/Intervention    Visual Impairment/Limitations  WNL  -CS     [facility] Name 04/01/21 0900          Range of Motion Comprehensive    General Range of Motion  bilateral upper extremity ROM WNL  [facility] Name 04/01/21 0900          Strength Comprehensive (MMT)    Comment, General Manual Muscle Testing (MMT) Assessment  BUE functional strength: 4+/5  -CS     Row Name 04/01/21 0900          Balance    Balance Assessment  sitting static balance;standing static balance;standing dynamic balance;sitting dynamic balance  -CS     Static Sitting Balance  WNL  -CS     Dynamic Sitting Balance  WNL  -CS     Static Standing Balance  WNL  -CS     Dynamic Standing Balance  WNL  -CS       User Key  (r) = Recorded By, (t) = Taken By, (c) = Cosigned By    Initials Name Provider Type     Geeta Manning, OTR/L, MELISSA Occupational Therapist        Goals/Plan    No documentation.       Clinical Impression     Row Name 04/01/21 0727          Pain Assessment    Additional Documentation  Pain Scale: FACES Pre/Post-Treatment (Group)  -CS     Row Name 04/01/21 0727          Pain Scale: FACES Pre/Post-Treatment    Pain: FACES Scale, Pretreatment  4-->hurts little more  -CS     Posttreatment Pain Rating  4-->hurts little more  -CS     Pain  Location - Orientation  posterior  -CS     Pain Location  neck;back  -CS     Row Name 04/01/21 0727          Plan of Care Review    Plan of Care Reviewed With  patient  -CS     Progress  improving  -CS     Outcome Summary  OT evaluation completed.  Pt demo no further need for skilled OT services.  Pt completed bed mobility and functional transfers with Supervision and progressed from S to I with functional mobility in hallway.  Pt was educated on spinal precautions and wear/care of cervical collar.  OT will sign off at this time with recommendation for pt to discharge home.  -CS     Row Name 04/01/21 0727          Therapy Assessment/Plan (OT)    Patient/Family Therapy Goal Statement (OT)  to go home  -CS     Criteria for Skilled Therapeutic Interventions Met (OT)  no;no problems identified which require skilled intervention  -CS     Therapy Frequency (OT)  evaluation only  -CS     Row Name 04/01/21 0727          Therapy Plan Review/Discharge Plan (OT)    Anticipated Discharge Disposition (OT)  home with assist  -CS     Row Name 04/01/21 0727          Positioning and Restraints    Pre-Treatment Position  in bed  -CS     Post Treatment Position  chair  -CS     In Chair  sitting;call light within reach;encouraged to call for assist;with brace  -CS       User Key  (r) = Recorded By, (t) = Taken By, (c) = Cosigned By    Initials Name Provider Type    CS Geeta Manning, OTR/L, CNT Occupational Therapist        Outcome Measures     Row Name 04/01/21 0810          How much help from another is currently needed...    Putting on and taking off regular lower body clothing?  4  -CS     Bathing (including washing, rinsing, and drying)  4  -CS     Toileting (which includes using toilet bed pan or urinal)  4  -CS     Putting on and taking off regular upper body clothing  4  -CS     Taking care of personal grooming (such as brushing teeth)  4  -CS     Eating meals  4  -CS     AM-PAC 6 Clicks Score (OT)  24  -CS     Row Name  04/01/21 0810          Functional Assessment    Outcome Measure Options  AM-PAC 6 Clicks Daily Activity (OT)  -       User Key  (r) = Recorded By, (t) = Taken By, (c) = Cosigned By    Initials Name Provider Type     Geeta Manning OTR/L, CNT Occupational Therapist        Occupational Therapy Education                 Title: PT OT SLP Therapies (Done)     Topic: Occupational Therapy (Done)     Point: ADL training (Done)     Description:   Instruct learner(s) on proper safety adaptation and remediation techniques during self care or transfers.   Instruct in proper use of assistive devices.              Learning Progress Summary           Patient Acceptance, E,TB, VU,DU by  at 4/1/2021 0910                   Point: Home exercise program (Done)     Description:   Instruct learner(s) on appropriate technique for monitoring, assisting and/or progressing therapeutic exercises/activities.              Learning Progress Summary           Patient Acceptance, E,TB, VU,DU by  at 4/1/2021 0910                   Point: Precautions (Done)     Description:   Instruct learner(s) on prescribed precautions during self-care and functional transfers.              Learning Progress Summary           Patient Acceptance, E,TB, VU,DU by  at 4/1/2021 0910                   Point: Body mechanics (Done)     Description:   Instruct learner(s) on proper positioning and spine alignment during self-care, functional mobility activities and/or exercises.              Learning Progress Summary           Patient Acceptance, E,TB, VU,DU by  at 4/1/2021 0910                               User Key     Initials Effective Dates Name Provider Type [facility] 04/02/19 -  Geeta Manning OTR/L, CNT Occupational Therapist OT              OT Recommendation and Plan  Therapy Frequency (OT): evaluation only  Plan of Care Review  Plan of Care Reviewed With: patient  Progress: improving  Outcome Summary: OT evaluation completed.  Pt demo no  further need for skilled OT services.  Pt completed bed mobility and functional transfers with Supervision and progressed from S to I with functional mobility in hallway.  Pt was educated on spinal precautions and wear/care of cervical collar.  OT will sign off at this time with recommendation for pt to discharge home.     Time Calculation:   Time Calculation- OT     Row Name 04/01/21 0908             Time Calculation- OT    OT Start Time  0810 20 min spent 3/31 for chart review, checking on pt, and assisting with obtaining [place] collar  -CS      OT Stop Time  0846  -CS      OT Time Calculation (min)  36 min  -CS      OT Received On  04/01/21  -        User Key  (r) = Recorded By, (t) = Taken By, (c) = Cosigned By    Initials Name Provider Type    CS Geeta Manning OTR/L, MELISSA Occupational Therapist        Therapy Charges for Today     Code Description Service Date Service Provider Modifiers Qty    71653884127 HC OT EVAL LOW COMPLEXITY 4 4/1/2021 Geeta Manning OTR/L, CNT GO 1               SHELLI Jonas/L, CNT  4/1/2021

## 2021-04-01 NOTE — THERAPY DISCHARGE NOTE
Patient Name: Jose Luis Ziegler  : 1970    MRN: [id number]                              Today's Date: 2021       Admit Date: 3/31/2021    Visit Dx:     ICD-10-CM ICD-9-CM   1. Displacement of cervical intervertebral disc without myelopathy  M50.20 722.0   2. Cervical spondylosis  M47.812 721.0   3. Cervical radiculopathy  M54.12 723.4   4. Decreased activities of daily living (ADL)  Z78.9 V49.89     Patient Active Problem List   Diagnosis   • Displacement of cervical intervertebral disc without myelopathy   • Weakness   • Right carpal tunnel syndrome   • Cervical spondylosis   • Body mass index (BMI) 45.0-49.9, adult   • Non-smoker   • Left carpal tunnel syndrome   • Cervical radiculopathy     Past Medical History:   Diagnosis Date   • Anemia    • Arthritis    • Asthma    • Candida infection    • Claustrophobia    • Cubital tunnel syndrome     BUE   • Fibromyalgia    • Gastritis    • GERD (gastroesophageal reflux disease)    • Gluten intolerance    • Gluten intolerance    • Hernia    • Hypertension    • Hypothyroidism    • OCD (obsessive compulsive disorder)    • OCD (obsessive compulsive disorder)    • Osteoarthritis    • Peptic ulcer     AND DUODENAL   • PTSD (post-traumatic stress disorder)    • Reflux gastritis    • Rheumatoid arthritis (CMS/HCC)    • Sleep apnea     uses a c-pap   • Spinal stenosis     injections in cervical area approx every 3 months.    • Thrush      Past Surgical History:   Procedure Laterality Date   • ANTERIOR CERVICAL DISCECTOMY W/ FUSION N/A 3/31/2021    Procedure: CERVICAL DISCECTOMY ANTERIOR INTERBODY  FUSION, ANTERIOR CERVICAL INSTRUMENTATION C4-5,5-6,6-7;  Surgeon: Antony Elmore MD;  Location:  [facility] OR;  Service: Neurosurgery;  Laterality: N/A;   • CARPAL TUNNEL RELEASE Left    • CARPAL TUNNEL RELEASE Right 2018    Procedure: RIGHT CARPAL TUNNEL RELEASE;  Surgeon: Derrell Pritchard MD;  Location:  [facility] OR;  Service:    • CHOLECYSTECTOMY     • LIPOMA  EXCISION     • ROTATOR CUFF REPAIR Right    • UMBILICAL HERNIA REPAIR      with mesh   • VASECTOMY  2002     General Information     Row Name 04/01/21 0815          Physical Therapy Time and Intention    Document Type  discharge evaluation/summary s/p 3/31 C4-7 Ant discectomy, Ant interbody fusion, partial corpectomies.  -PASQUALE     Mode of Treatment  physical therapy  -PASQUALE     Row Name 04/01/21 0815          General Information    Patient Profile Reviewed  yes  -PASQUALE     Prior Level of Function  independent:;all household mobility;ADL's;dressing;bathing  -PASQUALE     Existing Precautions/Restrictions  fall;spinal c-collar at all times, [place] J collar  -PASQUALE     Barriers to Rehab  none identified  -PASQUALE     Row Name 04/01/21 0815          Living Environment    Lives With  spouse  -PASQUALE     Row Name 04/01/21 0815          Home Main Entrance    Number of Stairs, Main Entrance  -- ramp  -PASQUALE     Row Name 04/01/21 0815          Cognition    Orientation Status (Cognition)  oriented x 4  -PASQUALE     Row Name 04/01/21 0815          Safety Issues, Functional Mobility    Impairments Affecting Function (Mobility)  pain  -PASQUALE       User Key  (r) = Recorded By, (t) = Taken By, (c) = Cosigned By    Initials Name Provider Type    Jose D Bay, PT DPT Physical Therapist        Mobility     Row Name 04/01/21 0815          Bed Mobility    Bed Mobility  supine-sit;sit-supine  -PASQUALE     Supine-Sit [place] (Bed Mobility)  supervision  -PASQUALE     Sit-Supine [place] (Bed Mobility)  not tested  -PASQUALE     Assistive Device (Bed Mobility)  head of bed elevated  -PASQUALE     Row Name 04/01/21 0815          Sit-Stand Transfer    Sit-Stand [place] (Transfers)  supervision  -PASQUALE     Row Name 04/01/21 0815          Gait/Stairs (Locomotion)    [place] Level (Gait)  supervision  -PASQUALE     Distance in Feet (Gait)  200  -PASQUALE     Deviations/Abnormal Patterns (Gait)  gait speed decreased  -PASQUALE       User Key  (r) = Recorded By, (t) = Taken By, (c) = Cosigned By     Initials Name Provider Type    Jose D Bay, PT DPT Physical Therapist        Obj/Interventions     Row Name 04/01/21 0815          Range of Motion Comprehensive    Comment, General Range of Motion  B LE AROM WFL  -PASQUALE     Row Name 04/01/21 0815          Strength Comprehensive (MMT)    Comment, General Manual Muscle Testing (MMT) Assessment  B LE functionally 4+/5  -PASQUALE     Row Name 04/01/21 0815          Balance    Balance Assessment  sitting static balance;standing static balance  -PASQUALE     Static Sitting Balance  WFL;unsupported;sitting in chair;sitting, edge of bed  -PASQUALE     Static Standing Balance  WFL;unsupported;standing  -PASQUALE     Row Name 04/01/21 0815          Sensory Assessment (Somatosensory)    Sensory Assessment (Somatosensory)  sensation intact B LE intact, per pt  -PASQUALE       User Key  (r) = Recorded By, (t) = Taken By, (c) = Cosigned By    Initials Name Provider Type    Jose D Bay, PT DPT Physical Therapist        Goals/Plan    No documentation.       Clinical Impression     Row Name 04/01/21 0815          Pain    Additional Documentation  Pain Scale: FACES Pre/Post-Treatment (Group)  -PASQUALE     Row Name 04/01/21 0815          Pain Scale: Numbers Pre/Post-Treatment    Pain Intervention(s)  Repositioned;Ambulation/increased activity  -PASQUALE     Row Name 04/01/21 0815          Pain Scale: FACES Pre/Post-Treatment    Pain: FACES Scale, Pretreatment  4-->hurts little more  -PASQUALE     Posttreatment Pain Rating  4-->hurts little more  -PASQUALE     Pain Location  neck  -PASQUALE     Pre/Posttreatment Pain Comment  sore throaat, back discomfort  -PASQUALE     Row Name 04/01/21 0815          Plan of Care Review    Plan of Care Reviewed With  patient  -PASQUALE     Outcome Summary  PT homa completed. He presents alert and orietned. He was educated on spinal precautions and c-collar use, abdelrahman, and doff. He was supervision to get OOB, stand and ambulate. He ambulated ~ 200 ft with a steady pace and no sway or LOB. He reports he is at  his baseline mobility and he had no further PT needs, questions or concerns. PT to sign off, I anticipate he will d/c home with assist.  -PASQUALE     Row Name 04/01/21 0815          Therapy Assessment/Plan (PT)    Patient/Family Therapy Goals Statement (PT)  go home w spouse  -PASQUALE     Criteria for Skilled Interventions Met (PT)  no;no problems identified which require skilled intervention  -PASQUALE     Row Name 04/01/21 0815          Positioning and Restraints    Pre-Treatment Position  in bed  -PASQUALE     Post Treatment Position  chair  -PASQUALE     In Chair  sitting;call light within reach;encouraged to call for assist  -PASQUALE       User Key  (r) = Recorded By, (t) = Taken By, (c) = Cosigned By    Initials Name Provider Type    Jose D Bay, PT DPT Physical Therapist        Outcome Measures     Row Name 04/01/21 0815          How much help from another person do you currently need...    Turning from your back to your side while in flat bed without using bedrails?  4  -PASQUALE     Moving from lying on back to sitting on the side of a flat bed without bedrails?  4  -PASQUALE     Moving to and from a bed to a chair (including a wheelchair)?  4  -PASQUALE     Standing up from a chair using your arms (e.g., wheelchair, bedside chair)?  4  -PASQUALE     Climbing 3-5 steps with a railing?  3  -PASQUALE     To walk in hospital room?  4  -PASQUALE     AM-PAC 6 Clicks Score (PT)  23  -PASQUALE     Row Name 04/01/21 0815          Functional Assessment    Outcome Measure Options  AM-PAC 6 Clicks Basic Mobility (PT)  -PASQUALE       User Key  (r) = Recorded By, (t) = Taken By, (c) = Cosigned By    Initials Name Provider Type    Jose D Bay, PT DPT Physical Therapist        Physical Therapy Education                 Title: PT OT SLP Therapies (Done)     Topic: Physical Therapy (Resolved)     Point: Mobility training (Resolved)     Learning Progress Summary           Patient Acceptance, KAYLAH CRABTREE DU by PASQUALE at 4/1/2021 0815    Comment: benefits of activity, c-collar use/abdelrahman/doff,  spinal prec                   Point: Precautions (Resolved)     Learning Progress Summary           Patient Acceptance, KAYLAH CRABTREE,DU by PASQUALE at 4/1/2021 0815    Comment: benefits of activity, c-collar use/abdelrahman/doff, spinal prec                               User Key     Initials Effective Dates Name Provider Type Discipline    PASQUALE 08/02/16 -  Jose D Hayes, PT DPT Physical Therapist PT              PT Recommendation and Plan     Plan of Care Reviewed With: patient  Outcome Summary: PT eval completed. He presents alert and orietned. He was educated on spinal precautions and c-collar use, abdelrahman, and doff. He was supervision to get OOB, stand and ambulate. He ambulated ~ 200 ft with a steady pace and no sway or LOB. He reports he is at his baseline mobility and he had no further PT needs, questions or concerns. PT to sign off, I anticipate he will d/c home with assist.     Time Calculation:   PT Charges     Row Name 04/01/21 0914             Time Calculation    Start Time  0815  -PASQUALE      Stop Time  0845 + 30 minutes with chart review and attempted eval yesterday. Much time spent yesterday to obtain proper neck brace. PT with 60 total minutes.  -PASQUALE      Time Calculation (min)  30 min  -PASQUALE      PT Received On  04/01/21  -PASQUALE         Time Calculation- PT    Total Timed Code Minutes- PT  60 minute(s)  -PASQUALE        User Key  (r) = Recorded By, (t) = Taken By, (c) = Cosigned By    Initials Name Provider Type    Jose D Bay, PT DPT Physical Therapist        Therapy Charges for Today     Code Description Service Date Service Provider Modifiers Qty    [id number] HC PT EVAL LOW COMPLEXITY 4 4/1/2021 Jose D Hayes, PT DPT GP 1          PT G-Codes  Outcome Measure Options: AM-PAC 6 Clicks Basic Mobility (PT)  AM-PAC 6 Clicks Score (PT): 23  AM-PAC 6 Clicks Score (OT): 24    PT Discharge Summary  Anticipated Discharge Disposition (PT): home with 24/7 care, home with assist    Jose D Hayes, PT DPT  4/1/2021

## 2021-04-01 NOTE — PLAN OF CARE
Goal Outcome Evaluation:  Plan of Care Reviewed With: patient  Progress: improving  Outcome Summary: OT evaluation completed.  Pt demo no further need for skilled OT services.  Pt completed bed mobility and functional transfers with Supervision and progressed from S to I with functional mobility in hallway.  Pt was educated on spinal precautions and wear/care of cervical collar.  OT will sign off at this time with recommendation for pt to discharge home.

## 2021-04-01 NOTE — PROGRESS NOTES
"Jose Luis Ziegler  51 y.o.      Chief complaint:   Neck pain status post cervical fusion    Subjective  No events    Temp:  [97.4 °F (36.3 °C)-100.2 °F (37.9 °C)] 98.1 °F (36.7 °C)  Heart Rate:  [] 103  Resp:  [14-20] 18  BP: (111-148)/(64-89) 123/64      Objective:  General Appearance:  Comfortable, well-appearing, in no acute distress and in pain.    Vital signs: (most recent): Blood pressure 123/64, pulse 103, temperature 98.1 °F (36.7 °C), temperature source Oral, resp. rate 18, height 186 cm (73.23\"), weight (!) 151 kg (332 lb 14.3 oz), SpO2 92 %.  Vital signs are normal.  No fever.    Output: Producing urine.    HEENT: Normal HEENT exam.    Lungs:  Normal effort and normal respiratory rate.  He is not in respiratory distress.    Heart: Normal rate.  Regular rhythm.    Chest: Symmetric chest wall expansion.   Extremities: Normal range of motion.    Skin:  Warm and dry.    Abdomen: Abdomen is soft and non-distended.  Bowel sounds are normal.   There is no abdominal tenderness.     Pulses: Distal pulses are intact.        Neurologic Exam     Mental Status   Oriented to person, place, and time.   Attention: normal. Concentration: normal.   Speech: speech is normal   Level of consciousness: alert  Normal comprehension.     Cranial Nerves     CN II   Visual fields full to confrontation.     CN III, IV, VI   Pupils are equal, round, and reactive to light.  Extraocular motions are normal.     CN V   Facial sensation intact.     CN VII   Facial expression full, symmetric.     CN VIII   CN VIII normal.     CN IX, X   CN IX normal.   CN X normal.     CN XI   CN XI normal.     CN XII   CN XII normal.     Motor Exam   Muscle bulk: normal    Strength   Strength 5/5 throughout.     Sensory Exam   Light touch normal.     Gait, Coordination, and Reflexes     Gait  Gait: normal    Reflexes   Reflexes 2+ except as noted.       Lab Results (last 24 hours)     Procedure Component Value Units Date/Time    Basic Metabolic " Panel [[id number]]  (Abnormal) Collected: 04/01/21 0441    Specimen: Blood Updated: 04/01/21 0558     Glucose 117 mg/dL      BUN 13 mg/dL      Creatinine 0.55 mg/dL      Sodium 140 mmol/L      Potassium 4.1 mmol/L      Chloride 103 mmol/L      CO2 29.0 mmol/L      Calcium 8.7 mg/dL      eGFR Non African Amer >150 mL/min/1.73      BUN/Creatinine Ratio 23.6     Anion Gap 8.0 mmol/L     Narrative:      GFR Normal >60  Chronic Kidney Disease <60  Kidney Failure <15      CBC & Differential [[id number]]  (Abnormal) Collected: 04/01/21 0441    Specimen: Blood Updated: 04/01/21 0540    Narrative:      The following orders were created for panel order CBC & Differential.  Procedure                               Abnormality         Status                     ---------                               -----------         ------                     CBC Auto Differential[[id number]]        Abnormal            Final result                 Please view results for these tests on the individual orders.    CBC Auto Differential [[id number]]  (Abnormal) Collected: 04/01/21 0441    Specimen: Blood Updated: 04/01/21 0540     WBC 11.64 10*3/mm3      RBC 4.23 10*6/mm3      Hemoglobin 12.4 g/dL      Hematocrit 37.4 %      MCV 88.4 fL      MCH 29.3 pg      MCHC 33.2 g/dL      RDW 15.4 %      RDW-SD 49.6 fl      MPV 10.8 fL      Platelets 247 10*3/mm3      Neutrophil % 77.3 %      Lymphocyte % 14.5 %      Monocyte % 7.3 %      Eosinophil % 0.3 %      Basophil % 0.3 %      Immature Grans % 0.3 %      Neutrophils, Absolute 9.00 10*3/mm3      Lymphocytes, Absolute 1.69 10*3/mm3      Monocytes, Absolute 0.85 10*3/mm3      Eosinophils, Absolute 0.04 10*3/mm3      Basophils, Absolute 0.03 10*3/mm3      Immature Grans, Absolute 0.03 10*3/mm3      nRBC 0.0 /100 WBC               Plan:   Patient's pain is under control.  Patient to be seen and evaluated by physical and Occupational Therapy.  Patient has a good day and is not having any pain control issues  or swallowing difficulty he can be discharged later this afternoon.      Displacement of cervical intervertebral disc without myelopathy    Cervical spondylosis    Cervical radiculopathy        Pavel Lyn, APRN

## 2021-04-01 NOTE — PLAN OF CARE
Goal Outcome Evaluation:  Plan of Care Reviewed With: patient     Outcome Summary: PT conoral completed. He presents alert and orietned. He was educated on spinal precautions and c-collar use, abdelrahman, and doff. He was supervision to get OOB, stand and ambulate. He ambulated ~ 200 ft with a steady pace and no sway or LOB. He reports he is at his baseline mobility and he had no further PT needs, questions or concerns. PT to sign off, I anticipate he will d/c home with assist.

## 2021-04-01 NOTE — PLAN OF CARE
Goal Outcome Evaluation:  Plan of Care Reviewed With: patient  Progress: improving  Outcome Summary: Pt A&Ox4, VSS safety maintained. Up with standby assist and collar in place. C/o pain 4/10 when swallowing. Educated on soft diet. Voiding per urinal. No neuro changes. Plan to d/c home today.

## 2021-04-21 ENCOUNTER — HOSPITAL ENCOUNTER (OUTPATIENT)
Dept: GENERAL RADIOLOGY | Facility: HOSPITAL | Age: 51
Discharge: HOME | End: 2021-04-21
Admitting: NURSE PRACTITIONER

## 2021-04-21 ENCOUNTER — OFFICE VISIT (OUTPATIENT)
Dept: NEUROSURGERY | Facility: CLINIC | Age: 51
End: 2021-04-21

## 2021-04-21 VITALS
WEIGHT: 315 LBS | DIASTOLIC BLOOD PRESSURE: 78 MMHG | BODY MASS INDEX: 41.75 KG/M2 | SYSTOLIC BLOOD PRESSURE: 138 MMHG | HEIGHT: 73 IN

## 2021-04-21 DIAGNOSIS — M50.20 DISPLACEMENT OF CERVICAL INTERVERTEBRAL DISC WITHOUT MYELOPATHY: ICD-10-CM

## 2021-04-21 DIAGNOSIS — E66.01 CLASS 3 SEVERE OBESITY DUE TO EXCESS CALORIES WITH BODY MASS INDEX (BMI) OF 40.0 TO 44.9 IN ADULT, UNSPECIFIED WHETHER SERIOUS COMORBIDITY PRESENT (HCC): ICD-10-CM

## 2021-04-21 DIAGNOSIS — M54.12 CERVICAL RADICULOPATHY: ICD-10-CM

## 2021-04-21 DIAGNOSIS — Z78.9 NON-SMOKER: ICD-10-CM

## 2021-04-21 DIAGNOSIS — M50.20 DISPLACEMENT OF CERVICAL INTERVERTEBRAL DISC WITHOUT MYELOPATHY: Primary | ICD-10-CM

## 2021-04-21 DIAGNOSIS — M47.812 CERVICAL SPONDYLOSIS: ICD-10-CM

## 2021-04-21 PROBLEM — E66.813 CLASS 3 SEVERE OBESITY DUE TO EXCESS CALORIES WITH BODY MASS INDEX (BMI) OF 40.0 TO 44.9 IN ADULT: Status: ACTIVE | Noted: 2020-07-27

## 2021-04-21 PROCEDURE — 99024 POSTOP FOLLOW-UP VISIT: CPT | Performed by: NURSE PRACTITIONER

## 2021-04-21 PROCEDURE — 72050 X-RAY EXAM NECK SPINE 4/5VWS: CPT

## 2021-04-21 NOTE — PATIENT INSTRUCTIONS
"BMI for Adults  What is BMI?  Body mass index (BMI) is a number that is calculated from a person's weight and height. BMI can help estimate how much of a person's weight is composed of fat. BMI does not measure body fat directly. Rather, it is an alternative to procedures that directly measure body fat, which can be difficult and expensive.  BMI can help identify people who may be at higher risk for certain medical problems.  What are BMI measurements used for?  BMI is used as a screening tool to identify possible weight problems. It helps determine whether a person is obese, overweight, a healthy weight, or underweight.  BMI is useful for:  · Identifying a weight problem that may be related to a medical condition or may increase the risk for medical problems.  · Promoting changes, such as changes in diet and exercise, to help reach a healthy weight. BMI screening can be repeated to see if these changes are working.  How is BMI calculated?  BMI involves measuring your weight in relation to your height. Both height and weight are measured, and the BMI is calculated from those numbers. This can be done either in English ([place] or metric measurements. Note that charts and online BMI calculators are available to help you find your BMI quickly and easily without having to do these calculations yourself.  To calculate your BMI in English ([place] measurements:    1. Measure your weight in pounds (lb).  2. Multiply the number of pounds by 703.  ? For example, for a person who weighs 180 lb, multiply that number by 703, which equals 126,540.  3. Measure your height in inches. Then multiply that number by itself to get a measurement called \"inches squared.\"  ? For example, for a person who is 70 inches tall, the \"inches squared\" measurement is 70 inches x 70 inches, which equals 4,900 inches squared.  4. Divide the total from step 2 (number of lb x 703) by the total from step 3 (inches squared): 126,540 ÷ 4,900 = 25.8. This is " "your BMI.  To calculate your BMI in metric measurements:  1. Measure your weight in kilograms (kg).  2. Measure your height in meters (m). Then multiply that number by itself to get a measurement called \"meters squared.\"  ? For example, for a person who is 1.75 m tall, the \"meters squared\" measurement is 1.75 m x 1.75 m, which is equal to 3.1 meters squared.  3. Divide the number of kilograms (your weight) by the meters squared number. In this example: 70 ÷ 3.1 = 22.6. This is your BMI.  What do the results mean?  BMI charts are used to identify whether you are underweight, normal weight, overweight, or obese. The following guidelines will be used:  · Underweight: BMI less than 18.5.  · Normal weight: BMI between 18.5 and 24.9.  · Overweight: BMI between 25 and 29.9.  · Obese: BMI of 30 or above.  Keep these notes in mind:  · Weight includes both fat and muscle, so someone with a muscular build, such as an athlete, may have a BMI that is higher than 24.9. In cases like these, BMI is not an accurate measure of body fat.  · To determine if excess body fat is the cause of a BMI of 25 or higher, further assessments may need to be done by a health care provider.  · BMI is usually interpreted in the same way for men and women.  Where to find more information  For more information about BMI, including tools to quickly calculate your BMI, go to these websites:  · Centers for Disease Control and Prevention: www.cdc.gov  · American Heart Association: www.heart.org  · National Heart, Lung, and Blood [place]: www.nhlbi.nih.gov  Summary  · Body mass index (BMI) is a number that is calculated from a person's weight and height.  · BMI may help estimate how much of a person's weight is composed of fat. BMI can help identify those who may be at higher risk for certain medical problems.  · BMI can be measured using English measurements or metric measurements.  · BMI charts are used to identify whether you are underweight, normal " "weight, overweight, or obese.  This information is not intended to replace advice given to you by your health care provider. Make sure you discuss any questions you have with your health care provider.  Document Revised: 09/09/2020 Document Reviewed: 07/17/2020  [employer] Patient Education © 2021 [employer]      https://www.nhlbi.nih.gov/files/docs/public/heart/dash_brief.pdf\">   DASH Eating Plan  DASH stands for Dietary Approaches to Stop Hypertension. The DASH eating plan is a healthy eating plan that has been shown to:  · Reduce high blood pressure (hypertension).  · Reduce your risk for type 2 diabetes, heart disease, and stroke.  · Help with weight loss.  What are tips for following this plan?  Reading food labels  · Check food labels for the amount of salt (sodium) per serving. Choose foods with less than 5 percent of the Daily Value of sodium. Generally, foods with less than 300 milligrams (mg) of sodium per serving fit into this eating plan.  · To find whole grains, look for the word \"whole\" as the first word in the ingredient list.  Shopping  · Buy products labeled as \"low-sodium\" or \"no salt added.\"  · Buy fresh foods. Avoid canned foods and pre-made or frozen meals.  Cooking  · Avoid adding salt when cooking. Use salt-free seasonings or herbs instead of table salt or sea salt. Check with your health care provider or pharmacist before using salt substitutes.  · Do not molina foods. Cook foods using healthy methods such as baking, boiling, grilling, roasting, and broiling instead.  · Cook with heart-healthy oils, such as olive, canola, avocado, soybean, or sunflower oil.  Meal planning    · Eat a balanced diet that includes:  ? 4 or more servings of fruits and 4 or more servings of vegetables each day. Try to fill one-half of your plate with fruits and vegetables.  ? 6-8 servings of whole grains each day.  ? Less than 6 oz (170 g) of lean meat, poultry, or fish each day. A 3-oz (85-g) serving of meat is " about the same size as a deck of cards. One egg equals 1 oz (28 g).  ? 2-3 servings of low-fat dairy each day. One serving is 1 cup (237 mL).  ? 1 serving of nuts, seeds, or beans 5 times each week.  ? 2-3 servings of heart-healthy fats. Healthy fats called omega-3 fatty acids are found in foods such as walnuts, flaxseeds, fortified milks, and eggs. These fats are also found in cold-water fish, such as sardines, salmon, and mackerel.  · Limit how much you eat of:  ? Canned or prepackaged foods.  ? Food that is high in trans fat, such as some fried foods.  ? Food that is high in saturated fat, such as fatty meat.  ? Desserts and other sweets, sugary drinks, and other foods with added sugar.  ? Full-fat dairy products.  · Do not salt foods before eating.  · Do not eat more than 4 egg yolks a week.  · Try to eat at least 2 vegetarian meals a week.  · Eat more home-cooked food and less restaurant, buffet, and fast food.  Lifestyle  · When eating at a restaurant, ask that your food be prepared with less salt or no salt, if possible.  · If you drink alcohol:  ? Limit how much you use to:  § 0-1 drink a day for women who are not pregnant.  § 0-2 drinks a day for men.  ? Be aware of how much alcohol is in your drink. In the [place] one drink equals one 12 oz bottle of beer (355 mL), one 5 oz glass of wine (148 mL), or one 1½ oz glass of hard liquor (44 mL).  General information  · Avoid eating more than 2,300 mg of salt a day. If you have hypertension, you may need to reduce your sodium intake to 1,500 mg a day.  · Work with your health care provider to maintain a healthy body weight or to lose weight. Ask what an ideal weight is for you.  · Get at least 30 minutes of exercise that causes your heart to beat faster (aerobic exercise) most days of the week. Activities may include walking, swimming, or biking.  · Work with your health care provider or dietitian to adjust your eating plan to your individual calorie needs.  What  foods should I eat?  Fruits  All fresh, dried, or frozen fruit. Canned fruit in natural juice (without added sugar).  Vegetables  Fresh or frozen vegetables (raw, steamed, roasted, or grilled). Low-sodium or reduced-sodium tomato and vegetable juice. Low-sodium or reduced-sodium tomato sauce and tomato paste. Low-sodium or reduced-sodium canned vegetables.  Grains  Whole-grain or whole-wheat bread. Whole-grain or whole-wheat pasta. Brown rice. Oatmeal. Quinoa. Bulgur. Whole-grain and low-sodium cereals. Rosie bread. Low-fat, low-sodium crackers. Whole-wheat flour tortillas.  Meats and other proteins  Skinless chicken or turkey. Ground chicken or turkey. Pork with fat trimmed off. Fish and seafood. Egg whites. Dried beans, peas, or lentils. Unsalted nuts, nut butters, and seeds. Unsalted canned beans. Lean cuts of beef with fat trimmed off. Low-sodium, lean precooked or cured meat, such as sausages or meat loaves.  Dairy  Low-fat (1%) or fat-free (skim) milk. Reduced-fat, low-fat, or fat-free cheeses. Nonfat, low-sodium ricotta or cottage cheese. Low-fat or nonfat yogurt. Low-fat, low-sodium cheese.  Fats and oils  Soft margarine without trans fats. Vegetable oil. Reduced-fat, low-fat, or light mayonnaise and salad dressings (reduced-sodium). Canola, safflower, olive, avocado, soybean, and sunflower oils. Avocado.  Seasonings and condiments  Herbs. Spices. Seasoning mixes without salt.  Other foods  Unsalted popcorn and pretzels. Fat-free sweets.  The items listed above may not be a complete list of foods and beverages you can eat. Contact a dietitian for more information.  What foods should I avoid?  Fruits  Canned fruit in a light or heavy syrup. Fried fruit. Fruit in cream or butter sauce.  Vegetables  Creamed or fried vegetables. Vegetables in a cheese sauce. Regular canned vegetables (not low-sodium or reduced-sodium). Regular canned tomato sauce and paste (not low-sodium or reduced-sodium). Regular tomato and  vegetable juice (not low-sodium or reduced-sodium). Pickles. Olives.  Grains  Baked goods made with fat, such as croissants, muffins, or some breads. Dry pasta or rice meal packs.  Meats and other proteins  Fatty cuts of meat. Ribs. Fried meat. Moran. Bologna, salami, and other precooked or cured meats, such as sausages or meat loaves. Fat from the back of a pig (fatback). Bratwurst. Salted nuts and seeds. Canned beans with added salt. Canned or smoked fish. Whole eggs or egg yolks. Chicken or turkey with skin.  Dairy  Whole or 2% milk, cream, and half-and-half. Whole or full-fat cream cheese. Whole-fat or sweetened yogurt. Full-fat cheese. Nondairy creamers. Whipped toppings. Processed cheese and cheese spreads.  Fats and oils  Butter. Stick margarine. Lard. Shortening. Ghee. Moran fat. Tropical oils, such as coconut, palm kernel, or palm oil.  Seasonings and condiments  Onion salt, garlic salt, seasoned salt, table salt, and sea salt. Worcestershire sauce. Tartar sauce. Barbecue sauce. Teriyaki sauce. Soy sauce, including reduced-sodium. Steak sauce. Canned and packaged gravies. Fish sauce. Oyster sauce. Cocktail sauce. Store-bought horseradish. Ketchup. Mustard. Meat flavorings and tenderizers. Bouillon cubes. Hot sauces. Pre-made or packaged marinades. Pre-made or packaged taco seasonings. Relishes. Regular salad dressings.  Other foods  Salted popcorn and pretzels.  The items listed above may not be a complete list of foods and beverages you should avoid. Contact a dietitian for more information.  Where to find more information  · National Heart, Lung, and Blood [place]: www.nhlbi.nih.gov  · American Heart Association: www.heart.org  · Academy of Nutrition and Dietetics: www.eatright.org  · National Kidney Foundation: www.kidney.org  Summary  · The DASH eating plan is a healthy eating plan that has been shown to reduce high blood pressure (hypertension). It may also reduce your risk for type 2 diabetes, heart  disease, and stroke.  · When on the DASH eating plan, aim to eat more fresh fruits and vegetables, whole grains, lean proteins, low-fat dairy, and heart-healthy fats.  · With the DASH eating plan, you should limit salt (sodium) intake to 2,300 mg a day. If you have hypertension, you may need to reduce your sodium intake to 1,500 mg a day.  · Work with your health care provider or dietitian to adjust your eating plan to your individual calorie needs.  This information is not intended to replace advice given to you by your health care provider. Make sure you discuss any questions you have with your health care provider.  Document Revised: 11/20/2020 Document Reviewed: 11/20/2020  [employer]vier Patient Education © 2021 [employer]

## 2021-04-21 NOTE — PROGRESS NOTES
"  Chief complaint:   Chief Complaint   Patient presents with   • Post-op     Garth is returning for a cervical surgery post op visit .         Subjective     HPI: This is a 51 y.o. male patient who went to the operating room on 3/31/2021 for a C4-5, 5-6, 6-7 ACDF.  The patient is here in follow up today for postoperative visit.  Prior to the surgery the patient was complaining of neck pain and bilateral upper extremity pain with the left being worse than the right along with numbness and tingling.  He states overall he feels like he is doing well.  He remains in the cervical collar.  He is only taking 1 pain pill a day.  He says he did just start to take diclofenac in the last week however I did explain to him that he does not need to take this medication at this time.  He is using a bone growth stimulator.  He does complain of occasional pain and the arms but this is very infrequent and much better than what it was prior to surgery.  Denies any bowel or bladder incontinence.  Denies any weakness.        Review of Systems   Musculoskeletal: Positive for back pain.   Neurological: Negative.    Psychiatric/Behavioral: Negative.          Objective      Vital Signs  /78   Ht 185.4 cm (73\")   Wt (!) 149 kg (328 lb)   BMI 43.27 kg/m²     Physical Exam  Constitutional:       Appearance: He is well-developed.   HENT:      Head: Normocephalic.   Eyes:      Pupils: Pupils are equal, round, and reactive to light.   Pulmonary:      Effort: Pulmonary effort is normal.   Musculoskeletal:         General: Normal range of motion.      Cervical back: Normal range of motion.   Skin:     General: Skin is warm.   Neurological:      Mental Status: He is alert and oriented to person, place, and time.      GCS: GCS eye subscore is 4. GCS verbal subscore is 5. GCS motor subscore is 6.      Cranial Nerves: No cranial nerve deficit.      Sensory: No sensory deficit.      Gait: Gait normal.      Deep Tendon Reflexes: Reflexes are " normal and symmetric.   Psychiatric:         Speech: Speech normal.         Behavior: Behavior normal.         Thought Content: Thought content normal.       Incisions clean dry and intact    Neurologic Exam     Mental Status   Oriented to person, place, and time.   Speech: speech is normal     Cranial Nerves     CN III, IV, VI   Pupils are equal, round, and reactive to light.      Results Review: No new imaging          Assessment/Plan: The patient is doing well from a surgical standpoint.  We will follow-up with him in 8 to 10 weeks.  I will send her for x-rays of the cervical spine to ensure that the fusion is taking and the hardware is intact.  Depending on the results of the x-rays we will call and let him know when he can come out of the brace.  He was told to call us for any further problems or concerns.    Patient is a nonsmoker  The patient's Body mass index is 43.27 kg/m².. BMI is above normal parameters. Recommendations include: educational material and nutrition counseling    Diagnoses and all orders for this visit:    1. Displacement of cervical intervertebral disc without myelopathy (Primary)  -     XR Spine Cervical Complete 4 or 5 View; Future    2. Cervical spondylosis  -     XR Spine Cervical Complete 4 or 5 View; Future    3. Cervical radiculopathy  -     XR Spine Cervical Complete 4 or 5 View; Future    4. Non-smoker    5. Class 3 severe obesity due to excess calories with body mass index (BMI) of 40.0 to 44.9 in adult, unspecified whether serious comorbidity present (CMS/[facility])        I discussed the patients findings and my recommendations with patient  Pavel Lyn, APRN  04/21/21  14:35 CDT    Answers for HPI/ROS submitted by the patient on 4/14/2021  What is the primary reason for your visit?: Other  Please describe your symptoms.: Follow-up on neck surgery  Have you had these symptoms before?: Yes  How long have you been having these symptoms?: Greater than 2 weeks

## 2021-04-22 ENCOUNTER — TELEPHONE (OUTPATIENT)
Dept: NEUROSURGERY | Facility: CLINIC | Age: 51
End: 2021-04-22

## 2021-04-22 NOTE — TELEPHONE ENCOUNTER
----- Message from SOFIA Alejandre sent at 4/22/2021  9:28 AM CDT -----  Xray looks good, out of brace on 5/10  ----- Message -----  From: Interface, Rad Attainia In  Sent: 4/21/2021   4:44 PM CDT  To: SOFIA Alejandre

## 2021-04-22 NOTE — TELEPHONE ENCOUNTER
Called patient with his x-ray results, explained the x-ray looks good and he can come out of the brace on 05/10/2021.  He may call the office with any problems or questions.

## 2021-06-08 ENCOUNTER — OFFICE VISIT (OUTPATIENT)
Dept: NEUROLOGY | Age: 51
End: 2021-06-08
Payer: MEDICARE

## 2021-06-08 VITALS
SYSTOLIC BLOOD PRESSURE: 117 MMHG | WEIGHT: 315 LBS | DIASTOLIC BLOOD PRESSURE: 74 MMHG | HEART RATE: 86 BPM | RESPIRATION RATE: 18 BRPM | HEIGHT: 72 IN | BODY MASS INDEX: 42.66 KG/M2

## 2021-06-08 DIAGNOSIS — G56.03 BILATERAL CARPAL TUNNEL SYNDROME: ICD-10-CM

## 2021-06-08 DIAGNOSIS — G56.23 CUBITAL TUNNEL SYNDROME, BILATERAL: ICD-10-CM

## 2021-06-08 DIAGNOSIS — G47.33 OBSTRUCTIVE SLEEP APNEA: Primary | ICD-10-CM

## 2021-06-08 DIAGNOSIS — M47.22 OSTEOARTHRITIS OF SPINE WITH RADICULOPATHY, CERVICAL REGION: ICD-10-CM

## 2021-06-08 PROCEDURE — 1036F TOBACCO NON-USER: CPT | Performed by: PSYCHIATRY & NEUROLOGY

## 2021-06-08 PROCEDURE — G8427 DOCREV CUR MEDS BY ELIG CLIN: HCPCS | Performed by: PSYCHIATRY & NEUROLOGY

## 2021-06-08 PROCEDURE — 99213 OFFICE O/P EST LOW 20 MIN: CPT | Performed by: PSYCHIATRY & NEUROLOGY

## 2021-06-08 PROCEDURE — 3017F COLORECTAL CA SCREEN DOC REV: CPT | Performed by: PSYCHIATRY & NEUROLOGY

## 2021-06-08 PROCEDURE — G8417 CALC BMI ABV UP PARAM F/U: HCPCS | Performed by: PSYCHIATRY & NEUROLOGY

## 2021-06-08 RX ORDER — ASPIRIN 81 MG/1
TABLET ORAL
COMMUNITY
End: 2022-02-08

## 2021-06-08 RX ORDER — CHLORAL HYDRATE 500 MG
CAPSULE ORAL
COMMUNITY

## 2021-06-08 RX ORDER — FAMOTIDINE 40 MG/1
40 TABLET, FILM COATED ORAL DAILY
COMMUNITY
End: 2022-02-08

## 2021-06-08 RX ORDER — MOMETASONE FUROATE 50 UG/1
2 SPRAY, METERED NASAL DAILY
COMMUNITY

## 2021-06-08 RX ORDER — HYDROCODONE BITARTRATE AND ACETAMINOPHEN 7.5; 325 MG/1; MG/1
TABLET ORAL
COMMUNITY

## 2021-06-08 RX ORDER — IPRATROPIUM BROMIDE AND ALBUTEROL SULFATE 2.5; .5 MG/3ML; MG/3ML
3 SOLUTION RESPIRATORY (INHALATION) EVERY 4 HOURS PRN
COMMUNITY

## 2021-06-08 NOTE — PROGRESS NOTES
[facility]  [address] [place] [address] Drive, [address] [place] [address],[address]  Toby Adam  Phone [phone]  Fax [phone]     [address], [address] Follow Up Encounter  21 9:31 AM CDT    Information:   Patient Name: Eliana Mclain  :   1970  Age:   46 y.o. MRN:   [id number]  Account #:  [de-identified]  Today:  21    Provider: Berta Deng M.D. Chief Complaint:   Chief Complaint   Patient presents with    Follow-up    Sleep Apnea       Subjective: Eliana Mclain is a 46 y.o. man with a history of obstructive sleep apnea and cervical spondylosis who is following up. He had an ACF 3/31/2021 with Dr. Josh Perez. He had CTR in 2018 but it did not help. His neck is doing better. He still has some numbness in the right hand. He uses his CPAP nightly. He rests fairly well. He uses a full face mask.         Objective:     Past Medical History:  Past Medical History:   Diagnosis Date    Anemia     Ankle edema     Anxiety     Asthma     Autoimmune disorder ([address]     been screened but not positively diagnosed    CPAP (continuous positive airway pressure) dependence     12cm    Depression     Diverticulosis     left colon    Fatigue     Fatty liver     Headache(784.0)     cluster sinus    Hyperthyroidism     Obesity, Class III, BMI 40-49.9 (morbid obesity) ([address]     Obsessive compulsive disorder     Obstructive sleep apnea     AHI:  19.4 per PSG, 2014    Pneumonia 1981    Proteinuria     PTSD (post-traumatic stress disorder)     Umbilical hernia     Umbilical pain 8389    Unspecified sleep apnea     uses CPAP on room air       Past Surgical History:   Procedure Laterality Date    CARPAL TUNNEL RELEASE Left     CERVICAL FUSION      CHOLECYSTECTOMY  2018    DENTAL SURGERY  1986    HERNIA REPAIR      umbilical & supraumbilical hernia repair on 14    HIATAL HERNIA REPAIR      ROTATOR CUFF REPAIR Right     VASECTOMY         Recent Hospitalizations  · None    Significant Injuries  · None    Habits  Varinder Beal reports that he quit smoking about 27 years ago. His smoking use included cigarettes. He has a 4.00 pack-year smoking history. He quit smokeless tobacco use about 12 years ago. His smokeless tobacco use included snuff. He reports current alcohol use. He reports that he does not use drugs. Family History   Problem Relation Age of Onset    Diabetes Mother     High Blood Pressure Mother     Diabetes Father     Cancer Father         leukemia    Diabetes Brother         type 1       Social History  Angelica Alarcon , lives in [facility], and is a student. Medications:  Current Outpatient Medications   Medication Sig Dispense Refill    famotidine (PEPCID) 40 MG tablet Take 40 mg by mouth daily      MAGNESIUM OXIDE -MG SUPPLEMENT PO Take 1 tablet by mouth daily      aspirin 81 MG EC tablet aspirin 81 mg tablet,delayed release   Take 1 tablet every day by oral route.       HYDROcodone-acetaminophen (NORCO) 7.5-325 MG per tablet hydrocodone 7.5 mg-acetaminophen 325 mg tablet      ipratropium-albuterol (DUONEB) 0.5-2.5 (3) MG/3ML SOLN nebulizer solution Inhale 3 mLs into the lungs every 4 hours as needed      mometasone (NASONEX) 50 MCG/ACT nasal spray 2 sprays by Nasal route daily      Omega-3 Fatty Acids (FISH OIL) 1000 MG CAPS Take by mouth daily (with breakfast)      Fluticasone furoate-vilanterol (BREO ELLIPTA) 200-25 MCG/INH AEPB inhaler Breo Ellipta 200 mcg-25 mcg/dose powder for inhalation      calcium carbonate (OSCAL) 500 MG TABS tablet Take 500 mg by mouth daily      lisinopril (PRINIVIL;ZESTRIL) 10 MG tablet Take 10 mg by mouth daily      tiZANidine (ZANAFLEX) 4 MG tablet Take 4 mg by mouth nightly      lamoTRIgine (LAMICTAL) 25 MG tablet Take 25 mg by mouth daily      Cholecalciferol (VITAMIN D3) 52924 units CAPS Take 10,000 Units by mouth       Levothyroxine Sodium (SYNTHROID PO) Take 0.5 mcg by mouth       methotrexate (RHEUMATREX) 2.5 MG chemo tablet Take by mouth once a week Indications: 10 PO Q Week       metoclopramide (REGLAN) 10 MG tablet Take 10 mg by mouth 4 times daily      folic acid (FOLVITE) 1 MG tablet Take 1 mg by mouth daily      aspirin-acetaminophen-caffeine (EXCEDRIN MIGRAINE) 250-250-65 MG per tablet Take 1 tablet by mouth as needed for Headaches      CARAFATE 1 GM/10ML suspension       albuterol sulfate  (90 Base) MCG/ACT inhaler Inhale 1-2 puffs into the lungs      montelukast (SINGULAIR) 10 MG tablet       albuterol (PROVENTIL) (2.5 MG/3ML) 0.083% nebulizer solution       sertraline (ZOLOFT) 100 MG tablet       cetirizine (ZYRTEC) 10 MG tablet Take 10 mg by mouth daily      azelastine (ASTELIN) 137 MCG/SPRAY nasal spray 1-2 sprays by Nasal route daily. Use in each nostril as directed       No current facility-administered medications for this visit. Allergies:   Allergies as of 06/08/2021 - Fully Reviewed 06/08/2021   Allergen Reaction Noted    Beta adrenergic blockers  10/16/2016    Gabapentin      Ibuprofen Other (See Comments) 12/21/2016    Medrol [methylprednisolone]  06/10/2014    Proton pump inhibitors  12/26/2017       Review of Systems:  Constitutional: negative for - chills and fever  Eyes:  negative for - visual disturbance and photophobia  HENMT: negative for - headaches and sinus pain  Respiratory: negative for - cough, hemoptysis, and shortness of breath  Cardiovascular: negative for - chest pain and palpitations  Gastrointestinal: negative for - blood in stools, constipation, diarrhea, nausea, and vomiting  Genito-Urinary: negative for - hematuria, urinary frequency, urinary urgency, and urinary retention  Musculoskeletal: positive for - joint pain, joint stiffness, and joint swelling  Hematological and Lymphatic: negative for - bleeding problems, abnormal bruising, and swollen lymph nodes  Endocrine:  negative for - polydipsia and polyphagia Allergy/Immunology:  negative for - rhinorrhea, sinus congestion, hives  Integument:  negative for - negative for - rash, change in moles, new or changing lesions  Psychological: negative for - anxiety and depression  Neurological: negative for - memory loss  Positive for - numbness/tingling, and weakness     PHYSICAL EXAMINATION:  Vitals:  /74   Pulse 86   Resp 18   Ht 6' (1.829 m)   Wt (!) 330 lb (149.7 kg)   BMI 44.76 kg/m²   General appearance:  Alert, well developed, well nourished, in no distress  HEENT:  normocephalic, atraumatic, sclera appear normal, no nasal abnormalities, no rhinorrhea, Ears appear normal, oral mucous membranes are moist without erythema, trachea midline, thyroid is normal, no lymphadenopathy or neck mass. III (soft palate, base of uvula visible). Cardiovascular:  Regular rate and rhythm without murmer. No peripheral edema, No cyanosis or clubbing. No carotid bruits. Pulmonary:  Lungs are clear to auscultation. Breathing appears normal, good expansion, normal effort without use of accessory muscles  Musculoskeletal:  Joints are arthritic. Integument:  No rash, erythema, or pallor  Psychiatric:  Mood, affect, and behavior appear normal      NEUROLOGIC EXAMINATION:  Mental Status:  alert, oriented to person, place, and time. Speech:  Clear without dysarthria or dysphonia  Language:  Fluent without aphasia  Cranial Nerves:   II Visual fields are full to confrontation   III,IV, VI Extraocular movements are full   VII Facial movements are symmetrical without weakness   VIII Hearing is intact   XII No tongue atrophy or fasciculations. Normal tongue protrusion. No tongue weakness  Motor:  He has muscle atrophy of intrinsic hand muscles. Normal strength in both upper and lower extremities. Normal muscle tone. Deep tendon reflexes are absent in both upper and lower extremities. Weems's signs are absent bilaterally. There is no ankle clonus on either side.   Sensation:

## 2021-06-23 ENCOUNTER — TELEPHONE (OUTPATIENT)
Dept: NEUROSURGERY | Facility: CLINIC | Age: 51
End: 2021-06-23

## 2021-06-23 NOTE — TELEPHONE ENCOUNTER
Tried to contact patient at his home #, his mobile #, and his wife's # but no one answered and there is no voicemail.    med lowery CMA

## 2021-06-25 ENCOUNTER — OFFICE VISIT (OUTPATIENT)
Dept: NEUROSURGERY | Facility: CLINIC | Age: 51
End: 2021-06-25

## 2021-06-25 VITALS — BODY MASS INDEX: 41.75 KG/M2 | WEIGHT: 315 LBS | HEIGHT: 73 IN

## 2021-06-25 DIAGNOSIS — M47.812 CERVICAL SPONDYLOSIS: ICD-10-CM

## 2021-06-25 DIAGNOSIS — G56.21 ULNAR NERVE COMPRESSION, RIGHT: ICD-10-CM

## 2021-06-25 DIAGNOSIS — G56.02 LEFT CARPAL TUNNEL SYNDROME: ICD-10-CM

## 2021-06-25 DIAGNOSIS — E66.01 CLASS 3 SEVERE OBESITY DUE TO EXCESS CALORIES WITH BODY MASS INDEX (BMI) OF 40.0 TO 44.9 IN ADULT, UNSPECIFIED WHETHER SERIOUS COMORBIDITY PRESENT (HCC): ICD-10-CM

## 2021-06-25 DIAGNOSIS — M54.12 CERVICAL RADICULOPATHY: ICD-10-CM

## 2021-06-25 DIAGNOSIS — R53.1 WEAKNESS: ICD-10-CM

## 2021-06-25 DIAGNOSIS — Z78.9 NON-SMOKER: ICD-10-CM

## 2021-06-25 DIAGNOSIS — G56.22 ULNAR NERVE COMPRESSION, LEFT: ICD-10-CM

## 2021-06-25 DIAGNOSIS — G56.01 RIGHT CARPAL TUNNEL SYNDROME: ICD-10-CM

## 2021-06-25 DIAGNOSIS — M50.20 DISPLACEMENT OF CERVICAL INTERVERTEBRAL DISC WITHOUT MYELOPATHY: Primary | ICD-10-CM

## 2021-06-25 PROCEDURE — 99024 POSTOP FOLLOW-UP VISIT: CPT | Performed by: NEUROLOGICAL SURGERY

## 2021-06-25 RX ORDER — SERTRALINE HYDROCHLORIDE 100 MG/1
TABLET, FILM COATED ORAL
COMMUNITY
Start: 2021-06-15

## 2021-06-25 NOTE — PATIENT INSTRUCTIONS
"PATIENT TO CONTINUE TO FOLLOW UP WITH HIS PRIMARY CARE PROVIDER FOR YEARLY PHYSICAL EXAMS TO ENSURE COMPLETE HEALTH MAINTENANCE        BMI for Adults  What is BMI?  Body mass index (BMI) is a number that is calculated from a person's weight and height. BMI can help estimate how much of a person's weight is composed of fat. BMI does not measure body fat directly. Rather, it is an alternative to procedures that directly measure body fat, which can be difficult and expensive.  BMI can help identify people who may be at higher risk for certain medical problems.  What are BMI measurements used for?  BMI is used as a screening tool to identify possible weight problems. It helps determine whether a person is obese, overweight, a healthy weight, or underweight.  BMI is useful for:  · Identifying a weight problem that may be related to a medical condition or may increase the risk for medical problems.  · Promoting changes, such as changes in diet and exercise, to help reach a healthy weight. BMI screening can be repeated to see if these changes are working.  How is BMI calculated?  BMI involves measuring your weight in relation to your height. Both height and weight are measured, and the BMI is calculated from those numbers. This can be done either in English ([place] or metric measurements. Note that charts and online BMI calculators are available to help you find your BMI quickly and easily without having to do these calculations yourself.  To calculate your BMI in English ([place] measurements:    1. Measure your weight in pounds (lb).  2. Multiply the number of pounds by 703.  ? For example, for a person who weighs 180 lb, multiply that number by 703, which equals 126,540.  3. Measure your height in inches. Then multiply that number by itself to get a measurement called \"inches squared.\"  ? For example, for a person who is 70 inches tall, the \"inches squared\" measurement is 70 inches x 70 inches, which equals 4,900 inches " "squared.  4. Divide the total from step 2 (number of lb x 703) by the total from step 3 (inches squared): 126,540 ÷ 4,900 = 25.8. This is your BMI.  To calculate your BMI in metric measurements:  1. Measure your weight in kilograms (kg).  2. Measure your height in meters (m). Then multiply that number by itself to get a measurement called \"meters squared.\"  ? For example, for a person who is 1.75 m tall, the \"meters squared\" measurement is 1.75 m x 1.75 m, which is equal to 3.1 meters squared.  3. Divide the number of kilograms (your weight) by the meters squared number. In this example: 70 ÷ 3.1 = 22.6. This is your BMI.  What do the results mean?  BMI charts are used to identify whether you are underweight, normal weight, overweight, or obese. The following guidelines will be used:  · Underweight: BMI less than 18.5.  · Normal weight: BMI between 18.5 and 24.9.  · Overweight: BMI between 25 and 29.9.  · Obese: BMI of 30 or above.  Keep these notes in mind:  · Weight includes both fat and muscle, so someone with a muscular build, such as an athlete, may have a BMI that is higher than 24.9. In cases like these, BMI is not an accurate measure of body fat.  · To determine if excess body fat is the cause of a BMI of 25 or higher, further assessments may need to be done by a health care provider.  · BMI is usually interpreted in the same way for men and women.  Where to find more information  For more information about BMI, including tools to quickly calculate your BMI, go to these websites:  · Centers for Disease Control and Prevention: www.cdc.gov  · American Heart Association: www.heart.org  · National Heart, Lung, and Blood [place]: www.nhlbi.nih.gov  Summary  · Body mass index (BMI) is a number that is calculated from a person's weight and height.  · BMI may help estimate how much of a person's weight is composed of fat. BMI can help identify those who may be at higher risk for certain medical problems.  · BMI " "can be measured using English measurements or metric measurements.  · BMI charts are used to identify whether you are underweight, normal weight, overweight, or obese.  This information is not intended to replace advice given to you by your health care provider. Make sure you discuss any questions you have with your health care provider.  Document Revised: 09/09/2020 Document Reviewed: 07/17/2020  [employer] Patient Education © 2021 [employer]      https://www.nhlbi.nih.gov/files/docs/public/heart/dash_brief.pdf\">   DASH Eating Plan  DASH stands for Dietary Approaches to Stop Hypertension. The DASH eating plan is a healthy eating plan that has been shown to:  · Reduce high blood pressure (hypertension).  · Reduce your risk for type 2 diabetes, heart disease, and stroke.  · Help with weight loss.  What are tips for following this plan?  Reading food labels  · Check food labels for the amount of salt (sodium) per serving. Choose foods with less than 5 percent of the Daily Value of sodium. Generally, foods with less than 300 milligrams (mg) of sodium per serving fit into this eating plan.  · To find whole grains, look for the word \"whole\" as the first word in the ingredient list.  Shopping  · Buy products labeled as \"low-sodium\" or \"no salt added.\"  · Buy fresh foods. Avoid canned foods and pre-made or frozen meals.  Cooking  · Avoid adding salt when cooking. Use salt-free seasonings or herbs instead of table salt or sea salt. Check with your health care provider or pharmacist before using salt substitutes.  · Do not molina foods. Cook foods using healthy methods such as baking, boiling, grilling, roasting, and broiling instead.  · Cook with heart-healthy oils, such as olive, canola, avocado, soybean, or sunflower oil.  Meal planning    · Eat a balanced diet that includes:  ? 4 or more servings of fruits and 4 or more servings of vegetables each day. Try to fill one-half of your plate with fruits and vegetables.  ? 6-8 " servings of whole grains each day.  ? Less than 6 oz (170 g) of lean meat, poultry, or fish each day. A 3-oz (85-g) serving of meat is about the same size as a deck of cards. One egg equals 1 oz (28 g).  ? 2-3 servings of low-fat dairy each day. One serving is 1 cup (237 mL).  ? 1 serving of nuts, seeds, or beans 5 times each week.  ? 2-3 servings of heart-healthy fats. Healthy fats called omega-3 fatty acids are found in foods such as walnuts, flaxseeds, fortified milks, and eggs. These fats are also found in cold-water fish, such as sardines, salmon, and mackerel.  · Limit how much you eat of:  ? Canned or prepackaged foods.  ? Food that is high in trans fat, such as some fried foods.  ? Food that is high in saturated fat, such as fatty meat.  ? Desserts and other sweets, sugary drinks, and other foods with added sugar.  ? Full-fat dairy products.  · Do not salt foods before eating.  · Do not eat more than 4 egg yolks a week.  · Try to eat at least 2 vegetarian meals a week.  · Eat more home-cooked food and less restaurant, buffet, and fast food.  Lifestyle  · When eating at a restaurant, ask that your food be prepared with less salt or no salt, if possible.  · If you drink alcohol:  ? Limit how much you use to:  § 0-1 drink a day for women who are not pregnant.  § 0-2 drinks a day for men.  ? Be aware of how much alcohol is in your drink. In the [place] one drink equals one 12 oz bottle of beer (355 mL), one 5 oz glass of wine (148 mL), or one 1½ oz glass of hard liquor (44 mL).  General information  · Avoid eating more than 2,300 mg of salt a day. If you have hypertension, you may need to reduce your sodium intake to 1,500 mg a day.  · Work with your health care provider to maintain a healthy body weight or to lose weight. Ask what an ideal weight is for you.  · Get at least 30 minutes of exercise that causes your heart to beat faster (aerobic exercise) most days of the week. Activities may include walking,  swimming, or biking.  · Work with your health care provider or dietitian to adjust your eating plan to your individual calorie needs.  What foods should I eat?  Fruits  All fresh, dried, or frozen fruit. Canned fruit in natural juice (without added sugar).  Vegetables  Fresh or frozen vegetables (raw, steamed, roasted, or grilled). Low-sodium or reduced-sodium tomato and vegetable juice. Low-sodium or reduced-sodium tomato sauce and tomato paste. Low-sodium or reduced-sodium canned vegetables.  Grains  Whole-grain or whole-wheat bread. Whole-grain or whole-wheat pasta. Brown rice. Oatmeal. Quinoa. Bulgur. Whole-grain and low-sodium cereals. Rosie bread. Low-fat, low-sodium crackers. Whole-wheat flour tortillas.  Meats and other proteins  Skinless chicken or turkey. Ground chicken or turkey. Pork with fat trimmed off. Fish and seafood. Egg whites. Dried beans, peas, or lentils. Unsalted nuts, nut butters, and seeds. Unsalted canned beans. Lean cuts of beef with fat trimmed off. Low-sodium, lean precooked or cured meat, such as sausages or meat loaves.  Dairy  Low-fat (1%) or fat-free (skim) milk. Reduced-fat, low-fat, or fat-free cheeses. Nonfat, low-sodium ricotta or cottage cheese. Low-fat or nonfat yogurt. Low-fat, low-sodium cheese.  Fats and oils  Soft margarine without trans fats. Vegetable oil. Reduced-fat, low-fat, or light mayonnaise and salad dressings (reduced-sodium). Canola, safflower, olive, avocado, soybean, and sunflower oils. Avocado.  Seasonings and condiments  Herbs. Spices. Seasoning mixes without salt.  Other foods  Unsalted popcorn and pretzels. Fat-free sweets.  The items listed above may not be a complete list of foods and beverages you can eat. Contact a dietitian for more information.  What foods should I avoid?  Fruits  Canned fruit in a light or heavy syrup. Fried fruit. Fruit in cream or butter sauce.  Vegetables  Creamed or fried vegetables. Vegetables in a cheese sauce. Regular canned  vegetables (not low-sodium or reduced-sodium). Regular canned tomato sauce and paste (not low-sodium or reduced-sodium). Regular tomato and vegetable juice (not low-sodium or reduced-sodium). Pickles. Olives.  Grains  Baked goods made with fat, such as croissants, muffins, or some breads. Dry pasta or rice meal packs.  Meats and other proteins  Fatty cuts of meat. Ribs. Fried meat. Moran. Bologna, salami, and other precooked or cured meats, such as sausages or meat loaves. Fat from the back of a pig (fatback). Bratwurst. Salted nuts and seeds. Canned beans with added salt. Canned or smoked fish. Whole eggs or egg yolks. Chicken or turkey with skin.  Dairy  Whole or 2% milk, cream, and half-and-half. Whole or full-fat cream cheese. Whole-fat or sweetened yogurt. Full-fat cheese. Nondairy creamers. Whipped toppings. Processed cheese and cheese spreads.  Fats and oils  Butter. Stick margarine. Lard. Shortening. Ghee. Moran fat. Tropical oils, such as coconut, palm kernel, or palm oil.  Seasonings and condiments  Onion salt, garlic salt, seasoned salt, table salt, and sea salt. Worcestershire sauce. Tartar sauce. Barbecue sauce. Teriyaki sauce. Soy sauce, including reduced-sodium. Steak sauce. Canned and packaged gravies. Fish sauce. Oyster sauce. Cocktail sauce. Store-bought horseradish. Ketchup. Mustard. Meat flavorings and tenderizers. Bouillon cubes. Hot sauces. Pre-made or packaged marinades. Pre-made or packaged taco seasonings. Relishes. Regular salad dressings.  Other foods  Salted popcorn and pretzels.  The items listed above may not be a complete list of foods and beverages you should avoid. Contact a dietitian for more information.  Where to find more information  · National Heart, Lung, and Blood [place]: www.nhlbi.nih.gov  · American Heart Association: www.heart.org  · Academy of Nutrition and Dietetics: www.eatright.org  · National Kidney Foundation: www.kidney.org  Summary  · The DASH eating plan is a  healthy eating plan that has been shown to reduce high blood pressure (hypertension). It may also reduce your risk for type 2 diabetes, heart disease, and stroke.  · When on the DASH eating plan, aim to eat more fresh fruits and vegetables, whole grains, lean proteins, low-fat dairy, and heart-healthy fats.  · With the DASH eating plan, you should limit salt (sodium) intake to 2,300 mg a day. If you have hypertension, you may need to reduce your sodium intake to 1,500 mg a day.  · Work with your health care provider or dietitian to adjust your eating plan to your individual calorie needs.  This information is not intended to replace advice given to you by your health care provider. Make sure you discuss any questions you have with your health care provider.  Document Revised: 11/20/2020 Document Reviewed: 11/20/2020  [employer] Patient Education © 2021 [employer]

## 2021-06-25 NOTE — PROGRESS NOTES
SUBJECTIVE:  Patient ID: Jose Luis Ziegler is a 51 y.o. male is here today for follow-up.    Chief Complaint: Neck pain  Chief Complaint   Patient presents with   • NECK & ARM PAIN     patient is here for follow up wound check; patient underwent C4-7 ACDF on 3/31/21.  Patient came out of the collar on 5/10/21.         HPI  51-year-old gentleman with the operating room for a 3 level anterior cervical fusion on March 31, 2021.  He says he does not really have any meaningful neck pain at this point.  He is very satisfied with the control of his neck pain.  He does occasionally get knots and spasms in his neck but this is significantly improved compared to before surgery.  He does describe some discomfort and numbness and tingling in an ulnar distribution in his hands left worse than right.    The following portions of the patient's history were reviewed and updated as appropriate: allergies, current medications, past family history, past medical history, past social history, past surgical history and problem list.    OBJECTIVE:    Review of Systems   Musculoskeletal: Positive for neck stiffness.   Neurological: Positive for numbness.   All other systems reviewed and are negative.         Physical Exam  Eyes:      Extraocular Movements: EOM normal.      Pupils: Pupils are equal, round, and reactive to light.   Neurological:      Mental Status: He is oriented to person, place, and time.      Coordination: Finger-Nose-Finger Test normal.      Gait: Gait is intact.      Deep Tendon Reflexes: Strength normal.   Psychiatric:         Speech: Speech normal.         Neurologic Exam     Mental Status   Oriented to person, place, and time.   Attention: normal.   Speech: speech is normal   Level of consciousness: alert  Knowledge: good.     Cranial Nerves     CN II   Visual fields full to confrontation.     CN III, IV, VI   Pupils are equal, round, and reactive to light.  Extraocular motions are normal.     CN V   Facial  sensation intact.     CN VII   Facial expression full, symmetric.     CN VIII   CN VIII normal.     CN IX, X   CN IX normal.   CN X normal.     CN XI   CN XI normal.     CN XII   CN XII normal.     Motor Exam   Muscle bulk: normal  Overall muscle tone: normal  Right arm pronator drift: absent  Left arm pronator drift: absent    Strength   Strength 5/5 throughout.     Sensory Exam   Light touch normal.   Pinprick normal.     Gait, Coordination, and Reflexes     Gait  Gait: normal    Coordination   Finger to nose coordination: normal    Tremor   Resting tremor: absent  Intention tremor: absent  Action tremor: absent    Reflexes   Reflexes 2+ except as noted.       Independent Review of Radiographic Studies:   Plain x-rays show good positioning of all interbody devices and hardware    ASSESSMENT/PLAN:  The patient is doing well after his anterior cervical fusion.  His neck pain is better.  The numbness and tingling discomfort in his hands does continue to improve although he does have EMG nerve conduction study that suggest moderate to severe ulnar neuropathy at the elbows.  He also does have some suggestion of carpal tunnel syndrome although he has had bilateral carpal tunnel release surgery.  We will see him in follow-up in about 3 months.  If the discomfort in his hands in an ulnar distribution continues then we may consider ulnar release surgery.      1. Displacement of cervical intervertebral disc without myelopathy    2. Cervical radiculopathy    3. Cervical spondylosis    4. Weakness    5. Right carpal tunnel syndrome    6. Left carpal tunnel syndrome    7. Ulnar nerve compression, right    8. Ulnar nerve compression, left    9. Non-smoker    10. Class 3 severe obesity due to excess calories with body mass index (BMI) of 40.0 to 44.9 in adult, unspecified whether serious comorbidity present (CMS/[facility])        The patient's Body mass index is 45.2 kg/m².. BMI is above normal parameters. Recommendations include:  educational material    No follow-ups on file.      Antony Elmore MD            Answers for HPI/ROS submitted by the patient on 6/23/2021  What is the primary reason for your visit?: Other  Please describe your symptoms.: Post ACDF surgery follow up  Have you had these symptoms before?: Yes  How long have you been having these symptoms?: Greater than 2 weeks

## 2021-09-14 ENCOUNTER — OFFICE VISIT (OUTPATIENT)
Dept: NEUROSURGERY | Facility: CLINIC | Age: 51
End: 2021-09-14

## 2021-09-14 ENCOUNTER — HOSPITAL ENCOUNTER (OUTPATIENT)
Dept: SLEEP CENTER | Age: 51
Discharge: HOME | End: 2021-09-16
Payer: MEDICARE

## 2021-09-14 VITALS — HEIGHT: 73 IN | BODY MASS INDEX: 41.75 KG/M2 | WEIGHT: 315 LBS

## 2021-09-14 DIAGNOSIS — Z78.9 NON-SMOKER: ICD-10-CM

## 2021-09-14 DIAGNOSIS — M50.20 DISPLACEMENT OF CERVICAL INTERVERTEBRAL DISC WITHOUT MYELOPATHY: Primary | ICD-10-CM

## 2021-09-14 DIAGNOSIS — M47.812 CERVICAL SPONDYLOSIS: ICD-10-CM

## 2021-09-14 DIAGNOSIS — M54.12 CERVICAL RADICULOPATHY: ICD-10-CM

## 2021-09-14 DIAGNOSIS — G56.01 RIGHT CARPAL TUNNEL SYNDROME: ICD-10-CM

## 2021-09-14 DIAGNOSIS — E66.01 MORBID OBESITY WITH BMI OF 45.0-49.9, ADULT (HCC): ICD-10-CM

## 2021-09-14 DIAGNOSIS — G56.21 ULNAR NERVE COMPRESSION, RIGHT: ICD-10-CM

## 2021-09-14 DIAGNOSIS — G56.22 ULNAR NERVE COMPRESSION, LEFT: ICD-10-CM

## 2021-09-14 DIAGNOSIS — G56.02 LEFT CARPAL TUNNEL SYNDROME: ICD-10-CM

## 2021-09-14 PROCEDURE — 99213 OFFICE O/P EST LOW 20 MIN: CPT | Performed by: NEUROLOGICAL SURGERY

## 2021-09-14 PROCEDURE — 95811 POLYSOM 6/>YRS CPAP 4/> PARM: CPT

## 2021-09-14 RX ORDER — HYDROCODONE BITARTRATE AND ACETAMINOPHEN 7.5; 325 MG/1; MG/1
TABLET ORAL
COMMUNITY

## 2021-09-14 RX ORDER — MECLIZINE HYDROCHLORIDE 25 MG/1
TABLET ORAL EVERY 8 HOURS SCHEDULED
COMMUNITY

## 2021-09-14 NOTE — PATIENT INSTRUCTIONS
"PATIENT TO CONTINUE TO FOLLOW UP WITH HIS PRIMARY CARE PROVIDER FOR YEARLY PHYSICAL EXAMS TO ENSURE COMPLETE HEALTH MAINTENANCE        BMI for Adults  What is BMI?  Body mass index (BMI) is a number that is calculated from a person's weight and height. BMI can help estimate how much of a person's weight is composed of fat. BMI does not measure body fat directly. Rather, it is an alternative to procedures that directly measure body fat, which can be difficult and expensive.  BMI can help identify people who may be at higher risk for certain medical problems.  What are BMI measurements used for?  BMI is used as a screening tool to identify possible weight problems. It helps determine whether a person is obese, overweight, a healthy weight, or underweight.  BMI is useful for:  · Identifying a weight problem that may be related to a medical condition or may increase the risk for medical problems.  · Promoting changes, such as changes in diet and exercise, to help reach a healthy weight. BMI screening can be repeated to see if these changes are working.  How is BMI calculated?  BMI involves measuring your weight in relation to your height. Both height and weight are measured, and the BMI is calculated from those numbers. This can be done either in English (U[place] or metric measurements. Note that charts and online BMI calculators are available to help you find your BMI quickly and easily without having to do these calculations yourself.  To calculate your BMI in English (U[place] measurements:    1. Measure your weight in pounds (lb).  2. Multiply the number of pounds by 703.  ? For example, for a person who weighs 180 lb, multiply that number by 703, which equals 126,540.  3. Measure your height in inches. Then multiply that number by itself to get a measurement called \"inches squared.\"  ? For example, for a person who is 70 inches tall, the \"inches squared\" measurement is 70 inches x 70 inches, which equals 4,900 inches " "squared.  4. Divide the total from step 2 (number of lb x 703) by the total from step 3 (inches squared): 126,540 ÷ 4,900 = 25.8. This is your BMI.    To calculate your BMI in metric measurements:  1. Measure your weight in kilograms (kg).  2. Measure your height in meters (m). Then multiply that number by itself to get a measurement called \"meters squared.\"  ? For example, for a person who is 1.75 m tall, the \"meters squared\" measurement is 1.75 m x 1.75 m, which is equal to 3.1 meters squared.  3. Divide the number of kilograms (your weight) by the meters squared number. In this example: 70 ÷ 3.1 = 22.6. This is your BMI.  What do the results mean?  BMI charts are used to identify whether you are underweight, normal weight, overweight, or obese. The following guidelines will be used:  · Underweight: BMI less than 18.5.  · Normal weight: BMI between 18.5 and 24.9.  · Overweight: BMI between 25 and 29.9.  · Obese: BMI of 30 or above.  Keep these notes in mind:  · Weight includes both fat and muscle, so someone with a muscular build, such as an athlete, may have a BMI that is higher than 24.9. In cases like these, BMI is not an accurate measure of body fat.  · To determine if excess body fat is the cause of a BMI of 25 or higher, further assessments may need to be done by a health care provider.  · BMI is usually interpreted in the same way for men and women.  Where to find more information  For more information about BMI, including tools to quickly calculate your BMI, go to these websites:  · Centers for Disease Control and Prevention: www.cdc.gov  · American Heart Association: www.heart.org  · National Heart, Lung, and Blood [place]: www.nhlbi.nih.gov  Summary  · Body mass index (BMI) is a number that is calculated from a person's weight and height.  · BMI may help estimate how much of a person's weight is composed of fat. BMI can help identify those who may be at higher risk for certain medical problems.  · BMI " "can be measured using English measurements or metric measurements.  · BMI charts are used to identify whether you are underweight, normal weight, overweight, or obese.  This information is not intended to replace advice given to you by your health care provider. Make sure you discuss any questions you have with your health care provider.  Document Revised: 09/09/2020 Document Reviewed: 07/17/2020  [employer] Patient Education © 2021 [employer]      https://www.nhlbi.nih.gov/files/docs/public/heart/dash_brief.pdf\">   DASH Eating Plan  DASH stands for Dietary Approaches to Stop Hypertension. The DASH eating plan is a healthy eating plan that has been shown to:  · Reduce high blood pressure (hypertension).  · Reduce your risk for type 2 diabetes, heart disease, and stroke.  · Help with weight loss.  What are tips for following this plan?  Reading food labels  · Check food labels for the amount of salt (sodium) per serving. Choose foods with less than 5 percent of the Daily Value of sodium. Generally, foods with less than 300 milligrams (mg) of sodium per serving fit into this eating plan.  · To find whole grains, look for the word \"whole\" as the first word in the ingredient list.  Shopping  · Buy products labeled as \"low-sodium\" or \"no salt added.\"  · Buy fresh foods. Avoid canned foods and pre-made or frozen meals.  Cooking  · Avoid adding salt when cooking. Use salt-free seasonings or herbs instead of table salt or sea salt. Check with your health care provider or pharmacist before using salt substitutes.  · Do not molina foods. Cook foods using healthy methods such as baking, boiling, grilling, roasting, and broiling instead.  · Cook with heart-healthy oils, such as olive, canola, avocado, soybean, or sunflower oil.  Meal planning    · Eat a balanced diet that includes:  ? 4 or more servings of fruits and 4 or more servings of vegetables each day. Try to fill one-half of your plate with fruits and vegetables.  ? 6-8 " servings of whole grains each day.  ? Less than 6 oz (170 g) of lean meat, poultry, or fish each day. A 3-oz (85-g) serving of meat is about the same size as a deck of cards. One egg equals 1 oz (28 g).  ? 2-3 servings of low-fat dairy each day. One serving is 1 cup (237 mL).  ? 1 serving of nuts, seeds, or beans 5 times each week.  ? 2-3 servings of heart-healthy fats. Healthy fats called omega-3 fatty acids are found in foods such as walnuts, flaxseeds, fortified milks, and eggs. These fats are also found in cold-water fish, such as sardines, salmon, and mackerel.  · Limit how much you eat of:  ? Canned or prepackaged foods.  ? Food that is high in trans fat, such as some fried foods.  ? Food that is high in saturated fat, such as fatty meat.  ? Desserts and other sweets, sugary drinks, and other foods with added sugar.  ? Full-fat dairy products.  · Do not salt foods before eating.  · Do not eat more than 4 egg yolks a week.  · Try to eat at least 2 vegetarian meals a week.  · Eat more home-cooked food and less restaurant, buffet, and fast food.    Lifestyle  · When eating at a restaurant, ask that your food be prepared with less salt or no salt, if possible.  · If you drink alcohol:  ? Limit how much you use to:  § 0-1 drink a day for women who are not pregnant.  § 0-2 drinks a day for men.  ? Be aware of how much alcohol is in your drink. In the [place] one drink equals one 12 oz bottle of beer (355 mL), one 5 oz glass of wine (148 mL), or one 1½ oz glass of hard liquor (44 mL).  General information  · Avoid eating more than 2,300 mg of salt a day. If you have hypertension, you may need to reduce your sodium intake to 1,500 mg a day.  · Work with your health care provider to maintain a healthy body weight or to lose weight. Ask what an ideal weight is for you.  · Get at least 30 minutes of exercise that causes your heart to beat faster (aerobic exercise) most days of the week. Activities may include walking,  swimming, or biking.  · Work with your health care provider or dietitian to adjust your eating plan to your individual calorie needs.  What foods should I eat?  Fruits  All fresh, dried, or frozen fruit. Canned fruit in natural juice (without added sugar).  Vegetables  Fresh or frozen vegetables (raw, steamed, roasted, or grilled). Low-sodium or reduced-sodium tomato and vegetable juice. Low-sodium or reduced-sodium tomato sauce and tomato paste. Low-sodium or reduced-sodium canned vegetables.  Grains  Whole-grain or whole-wheat bread. Whole-grain or whole-wheat pasta. Brown rice. Oatmeal. Quinoa. Bulgur. Whole-grain and low-sodium cereals. Rosie bread. Low-fat, low-sodium crackers. Whole-wheat flour tortillas.  Meats and other proteins  Skinless chicken or turkey. Ground chicken or turkey. Pork with fat trimmed off. Fish and seafood. Egg whites. Dried beans, peas, or lentils. Unsalted nuts, nut butters, and seeds. Unsalted canned beans. Lean cuts of beef with fat trimmed off. Low-sodium, lean precooked or cured meat, such as sausages or meat loaves.  Dairy  Low-fat (1%) or fat-free (skim) milk. Reduced-fat, low-fat, or fat-free cheeses. Nonfat, low-sodium ricotta or cottage cheese. Low-fat or nonfat yogurt. Low-fat, low-sodium cheese.  Fats and oils  Soft margarine without trans fats. Vegetable oil. Reduced-fat, low-fat, or light mayonnaise and salad dressings (reduced-sodium). Canola, safflower, olive, avocado, soybean, and sunflower oils. Avocado.  Seasonings and condiments  Herbs. Spices. Seasoning mixes without salt.  Other foods  Unsalted popcorn and pretzels. Fat-free sweets.  The items listed above may not be a complete list of foods and beverages you can eat. Contact a dietitian for more information.  What foods should I avoid?  Fruits  Canned fruit in a light or heavy syrup. Fried fruit. Fruit in cream or butter sauce.  Vegetables  Creamed or fried vegetables. Vegetables in a cheese sauce. Regular canned  vegetables (not low-sodium or reduced-sodium). Regular canned tomato sauce and paste (not low-sodium or reduced-sodium). Regular tomato and vegetable juice (not low-sodium or reduced-sodium). Pickles. Olives.  Grains  Baked goods made with fat, such as croissants, muffins, or some breads. Dry pasta or rice meal packs.  Meats and other proteins  Fatty cuts of meat. Ribs. Fried meat. Moran. Bologna, salami, and other precooked or cured meats, such as sausages or meat loaves. Fat from the back of a pig (fatback). Bratwurst. Salted nuts and seeds. Canned beans with added salt. Canned or smoked fish. Whole eggs or egg yolks. Chicken or turkey with skin.  Dairy  Whole or 2% milk, cream, and half-and-half. Whole or full-fat cream cheese. Whole-fat or sweetened yogurt. Full-fat cheese. Nondairy creamers. Whipped toppings. Processed cheese and cheese spreads.  Fats and oils  Butter. Stick margarine. Lard. Shortening. Ghee. Moran fat. Tropical oils, such as coconut, palm kernel, or palm oil.  Seasonings and condiments  Onion salt, garlic salt, seasoned salt, table salt, and sea salt. Worcestershire sauce. Tartar sauce. Barbecue sauce. Teriyaki sauce. Soy sauce, including reduced-sodium. Steak sauce. Canned and packaged gravies. Fish sauce. Oyster sauce. Cocktail sauce. Store-bought horseradish. Ketchup. Mustard. Meat flavorings and tenderizers. Bouillon cubes. Hot sauces. Pre-made or packaged marinades. Pre-made or packaged taco seasonings. Relishes. Regular salad dressings.  Other foods  Salted popcorn and pretzels.  The items listed above may not be a complete list of foods and beverages you should avoid. Contact a dietitian for more information.  Where to find more information  · National Heart, Lung, and Blood [place]: www.nhlbi.nih.gov  · American Heart Association: www.heart.org  · Academy of Nutrition and Dietetics: www.eatright.org  · National Kidney Foundation: www.kidney.org  Summary  · The DASH eating plan is a  healthy eating plan that has been shown to reduce high blood pressure (hypertension). It may also reduce your risk for type 2 diabetes, heart disease, and stroke.  · When on the DASH eating plan, aim to eat more fresh fruits and vegetables, whole grains, lean proteins, low-fat dairy, and heart-healthy fats.  · With the DASH eating plan, you should limit salt (sodium) intake to 2,300 mg a day. If you have hypertension, you may need to reduce your sodium intake to 1,500 mg a day.  · Work with your health care provider or dietitian to adjust your eating plan to your individual calorie needs.  This information is not intended to replace advice given to you by your health care provider. Make sure you discuss any questions you have with your health care provider.  Document Revised: 11/20/2020 Document Reviewed: 11/20/2020  Elsevier Patient Education © 2021 [employer]

## 2021-09-14 NOTE — PROGRESS NOTES
"SUBJECTIVE:  Patient ID: Jose Luis Ziegler is a 51 y.o. male is here today for follow-up.    Chief Complaint: Neck pain  Chief Complaint   Patient presents with   • neck & arm pain     patient is here for a 3 month follow up; patient underwent C4-7 ACDF on 3/31/21; patient continued to have N/T bilateral hands with ?CTS; he says that he continues to have BUE N/T/pain.       HPI  51-year-old gentleman underwent at 3 level anterior cervical fusion March 31, 2021.  He does not really complain of any neck pain per se.  He says he still get will get intermittent right upper extremity radicular pain.  He describes numbness and tingling in the fourth and fifth fingers of the right hand and to a lesser extent in the fourth and fifth fingers of the left hand.  He says this comes and goes sometimes it severe he says sometimes it is very very mild.  He also complains of some pain between his shoulder blades and he says that he is noticed that his left shoulder itself is been popping out\" subluxing \"    The following portions of the patient's history were reviewed and updated as appropriate: allergies, current medications, past family history, past medical history, past social history, past surgical history and problem list.    OBJECTIVE:    Review of Systems   Musculoskeletal: Positive for arthralgias.   Neurological: Positive for numbness.   All other systems reviewed and are negative.         Physical Exam  Eyes:      Extraocular Movements: EOM normal.      Pupils: Pupils are equal, round, and reactive to light.   Neurological:      Mental Status: He is oriented to person, place, and time.      Coordination: Finger-Nose-Finger Test normal.      Gait: Gait is intact.      Deep Tendon Reflexes: Strength normal.   Psychiatric:         Speech: Speech normal.         Neurologic Exam     Mental Status   Oriented to person, place, and time.   Attention: normal.   Speech: speech is normal   Level of consciousness: alert  Knowledge: " good.     Cranial Nerves     CN II   Visual fields full to confrontation.     CN III, IV, VI   Pupils are equal, round, and reactive to light.  Extraocular motions are normal.     CN V   Facial sensation intact.     CN VII   Facial expression full, symmetric.     CN VIII   CN VIII normal.     CN IX, X   CN IX normal.   CN X normal.     CN XI   CN XI normal.     CN XII   CN XII normal.     Motor Exam   Muscle bulk: normal  Overall muscle tone: normal  Right arm pronator drift: absent  Left arm pronator drift: absent    Strength   Strength 5/5 throughout.     Sensory Exam   Light touch normal.   Pinprick normal.     Gait, Coordination, and Reflexes     Gait  Gait: normal    Coordination   Finger to nose coordination: normal    Tremor   Resting tremor: absent  Intention tremor: absent  Action tremor: absent    Reflexes   Reflexes 2+ except as noted.       Independent Review of Radiographic Studies:   Plain x-rays show good position of all interbody devices and hardware.    ASSESSMENT/PLAN:  The patient now has gotten some meaningful relief of the radicular pain in his arms and his neck pain.  He still has some intermittent numbness and tingling in his hands.  He does have an EMG nerve conduction study which is notable for moderate to severe left ulnar neuropathy and moderate right ulnar neuropathy.  At this point I do not think he would benefit from a repeat physical therapy.  He is already had an extensive course of injection treatments prior to surgery.  We will see him in follow-up in 3 to 4 months.  .    1. Displacement of cervical intervertebral disc without myelopathy    2. Cervical spondylosis    3. Cervical radiculopathy    4. Ulnar nerve compression, right    5. Ulnar nerve compression, left    6. Right carpal tunnel syndrome    7. Left carpal tunnel syndrome    8. Non-smoker    9. Morbid obesity with BMI of 45.0-49.9, adult (CMS/[facility])        The patient's Body mass index is 45.52 kg/m².. BMI is above normal  parameters. Recommendations include: educational material    Return in about 5 months (around 2/14/2022) for FOLLOW UP EMILIANO (15 MINS).      Antony Elmore MD

## 2021-09-15 NOTE — PROGRESS NOTES
[facility]  [address]  Phone [phone] Fax [phone]     Sleep Study Technician Review    Patient Name:  Sabino Warren  :   1970  Referring Provider: Javi Zamora MD    Brief History:  Sabino Warren is a 46 y.o. male with a history of OCD, PTSD, Anxiety, ALFREDA, Depression, Headaches, Obesity, Asthma and Fatigue who has been referred for a sleep study. He uses his CPAP nightly. He rests fairly well. He uses a full face mask. Pt is needing a new machine. Height: 72\"  Weight:  330 lbs  BMI: 44.76  Neck Circ:  21\"  MALLAMPATI: Type 4  ESS:      Type of Study: PSG/Split  Time Stage Position Snore Hypopnea Obs Apnea Suresh Apnea PAP O2   2100 Awake Supine No No No No  RA   2200 2 Supine Yes Yes No No  RA   2300 2 Supine Yes Yes No No  RA   2400 2 Right No No No No Cpap 9 RA   0100 2 Supine No No No No Cpap 12 RA   0200 REM Supine No No No No Cpap 15 RA   0300 1 Supine No No No No Cpap 16 RA   0400 2 Supine No No No No Cpap 16 RA   0410 Awake Supine No No No No Cpap 16 RA     Summary:  Pt stated that he uses his cpap on a nightly basis. Pt stated that he is needing another machine. Pt stated that he still feels tired some days. Pt stated that he snores and has trouble breathing when he does not wear therapy. Tech noted enough respiratory events to begin titration. Pt tolerated mask well. DME: [employer]     Final PAP settings:  Cpap 16  Mask Type:  Full face   Mask: Quattro   Mask Size: Medium    The study was reviewed briefly with Sabino Warren. He will be notified of the formal results and recommendations after the study is scored and interpreted. The report will be sent to his referring provider.     Technician: YULIYA Newsome

## 2021-09-26 DIAGNOSIS — G47.33 OBSTRUCTIVE SLEEP APNEA: Primary | ICD-10-CM

## 2021-09-26 PROCEDURE — 95811 POLYSOM 6/>YRS CPAP 4/> PARM: CPT | Performed by: PSYCHIATRY & NEUROLOGY

## 2021-09-26 NOTE — PROGRESS NOTES
[place] [address]  [address]  Phone [phone] Fax [phone]     Patient Name: Yessy Schmidt 2021  : 1970  Age: 46 y.o.   Patient Address: [address] [place] [address]       Patient Phone: [phone] (home)     REFERRAL  Referred to: DME provider of patient's choice  Yessy Schmidt is referred for the following:    DME Equipment HPCPS Code Setting   CPAP device with flex or comparable pressure relief per comfort  16cm   Heated Humidifier  Patient Choice       Replinishible PAP Supplies, 1 year supply  Item HPCPS Code Frequency   Mask of choice  or  1 per 3 months   Nasal Mask cushion/pillows  or  2 per 30 days   Full Face Mask Interface  1 per 30 days   Headgear  1 per 6 months   Tubing, length of choice  or  1 per 3 months   Water Chamber  1 per 6 months   Chinstrap  1 per 6 months   Disposable Filters  2 per 30 days   Reusable Filters  1 per 6 months     Diagnoses:  Obstructive sleep apnea (G47.33)  Length of Need: Lifetime, 99    Ordering Provider: BETSEY Lin: [id number]         Signature:       Date: 2021      Electronically Signed by Cecilia Bobby M.D.

## 2021-12-13 ENCOUNTER — TRANSCRIBE ORDERS (OUTPATIENT)
Dept: ADMINISTRATIVE | Facility: HOSPITAL | Age: 51
End: 2021-12-13

## 2021-12-13 DIAGNOSIS — M25.561 RIGHT KNEE PAIN, UNSPECIFIED CHRONICITY: Primary | ICD-10-CM

## 2021-12-15 ENCOUNTER — OFFICE VISIT (OUTPATIENT)
Dept: NEUROSURGERY | Facility: CLINIC | Age: 51
End: 2021-12-15

## 2021-12-15 VITALS — BODY MASS INDEX: 41.75 KG/M2 | HEIGHT: 73 IN | WEIGHT: 315 LBS

## 2021-12-15 DIAGNOSIS — E66.01 CLASS 3 SEVERE OBESITY DUE TO EXCESS CALORIES WITHOUT SERIOUS COMORBIDITY WITH BODY MASS INDEX (BMI) OF 45.0 TO 49.9 IN ADULT (HCC): ICD-10-CM

## 2021-12-15 DIAGNOSIS — G56.22 ULNAR NERVE COMPRESSION, LEFT: ICD-10-CM

## 2021-12-15 DIAGNOSIS — Z78.9 NON-SMOKER: ICD-10-CM

## 2021-12-15 DIAGNOSIS — M47.812 CERVICAL SPONDYLOSIS: Primary | ICD-10-CM

## 2021-12-15 DIAGNOSIS — G56.21 ULNAR NERVE COMPRESSION, RIGHT: ICD-10-CM

## 2021-12-15 PROCEDURE — 99213 OFFICE O/P EST LOW 20 MIN: CPT | Performed by: NURSE PRACTITIONER

## 2021-12-15 NOTE — PROGRESS NOTES
"    Chief complaint:   Chief Complaint   Patient presents with   • Follow-up     Pt is here for a follow up. He states he feels pretty good. He still has some pain.         Subjective     HPI: This is a 51-year-old male gentleman who went to the operating room on March 31, 2021 for a C4-5, 5-6, 6-7 ACDF.  At his last office appointment in September he was not really complaining of any neck pain but was still complaining of some intermittent right upper extremity pain and numbness and tingling in both hands.  He comes in today states that overall he does like his made progress in the last few months with his neck pain and pain that was going into his arms.  He still does have some stiffness and muscle tightness but he says overall he feels like he is doing better.  He is not taking pain medication.  He does feel like he is still dealing with numbness and tingling from the elbows down to the last 2 digits of his hand in both arms.  He says this is annoying.  Other than this though he does feel like he is doing well and has had good improvement with his symptoms    Review of Systems   Musculoskeletal: Positive for neck stiffness.   Neurological: Positive for numbness.   Psychiatric/Behavioral: Negative.          Objective      Vital Signs  Ht 185.4 cm (73\")   Wt (!) 158 kg (348 lb 3.2 oz)   BMI 45.94 kg/m²     Physical Exam  Constitutional:       Appearance: He is well-developed.   HENT:      Head: Normocephalic.   Eyes:      Extraocular Movements: EOM normal.      Pupils: Pupils are equal, round, and reactive to light.   Pulmonary:      Effort: Pulmonary effort is normal.   Musculoskeletal:         General: Normal range of motion.      Cervical back: Normal range of motion.   Skin:     General: Skin is warm.   Neurological:      Mental Status: He is alert and oriented to person, place, and time.      GCS: GCS eye subscore is 4. GCS verbal subscore is 5. GCS motor subscore is 6.      Cranial Nerves: No cranial nerve " deficit.      Sensory: No sensory deficit.      Gait: Gait is intact. Gait normal.      Deep Tendon Reflexes: Strength normal and reflexes are normal and symmetric.   Psychiatric:         Speech: Speech normal.         Behavior: Behavior normal.         Thought Content: Thought content normal.         Neurologic Exam     Mental Status   Oriented to person, place, and time.   Attention: normal. Concentration: normal.   Speech: speech is normal   Level of consciousness: alert  Normal comprehension.     Cranial Nerves     CN II   Visual fields full to confrontation.     CN III, IV, VI   Pupils are equal, round, and reactive to light.  Extraocular motions are normal.     CN V   Facial sensation intact.     CN VII   Facial expression full, symmetric.     CN VIII   CN VIII normal.     CN IX, X   CN IX normal.   CN X normal.     CN XI   CN XI normal.     CN XII   CN XII normal.     Motor Exam   Muscle bulk: normal    Strength   Strength 5/5 throughout.     Sensory Exam   Light touch normal.     Gait, Coordination, and Reflexes     Gait  Gait: normal    Reflexes   Reflexes 2+ except as noted.       Imaging review: No new imaging        Assessment/Plan: The patient does have known cubital tunnel syndrome bilaterally.  I think this is the issue that the patient is still dealing with as it does feel he has had significant improvement from the neck surgery.  The patient does want to see how things go over the next few months and see where he is at a year from the surgery and will follow up with Dr. Elmore in the next available appointment and if he is still dealing with numbness and tingling will consider possible cubital tunnel release    Patient is a nonsmoker  The patient's Body mass index is 45.94 kg/m².. BMI is above normal parameters. Recommendations include: educational material and nutrition counseling    Diagnoses and all orders for this visit:    1. Cervical spondylosis (Primary)    2. Ulnar nerve compression,  left    3. Ulnar nerve compression, right    4. Non-smoker    5. Class 3 severe obesity due to excess calories without serious comorbidity with body mass index (BMI) of 45.0 to 49.9 in adult (HCC)        I discussed the patients findings and my recommendations with patient  Pavel Lyn, APRN  12/15/21  09:51 CST

## 2021-12-15 NOTE — PATIENT INSTRUCTIONS
"BMI for Adults  What is BMI?  Body mass index (BMI) is a number that is calculated from a person's weight and height. BMI can help estimate how much of a person's weight is composed of fat. BMI does not measure body fat directly. Rather, it is an alternative to procedures that directly measure body fat, which can be difficult and expensive.  BMI can help identify people who may be at higher risk for certain medical problems.  What are BMI measurements used for?  BMI is used as a screening tool to identify possible weight problems. It helps determine whether a person is obese, overweight, a healthy weight, or underweight.  BMI is useful for:  · Identifying a weight problem that may be related to a medical condition or may increase the risk for medical problems.  · Promoting changes, such as changes in diet and exercise, to help reach a healthy weight. BMI screening can be repeated to see if these changes are working.  How is BMI calculated?  BMI involves measuring your weight in relation to your height. Both height and weight are measured, and the BMI is calculated from those numbers. This can be done either in English ([place] or metric measurements. Note that charts and online BMI calculators are available to help you find your BMI quickly and easily without having to do these calculations yourself.  To calculate your BMI in English ([place] measurements:    1. Measure your weight in pounds (lb).  2. Multiply the number of pounds by 703.  ? For example, for a person who weighs 180 lb, multiply that number by 703, which equals 126,540.  3. Measure your height in inches. Then multiply that number by itself to get a measurement called \"inches squared.\"  ? For example, for a person who is 70 inches tall, the \"inches squared\" measurement is 70 inches x 70 inches, which equals 4,900 inches squared.  4. Divide the total from step 2 (number of lb x 703) by the total from step 3 (inches squared): 126,540 ÷ 4,900 = 25.8. This is " "your BMI.    To calculate your BMI in metric measurements:  1. Measure your weight in kilograms (kg).  2. Measure your height in meters (m). Then multiply that number by itself to get a measurement called \"meters squared.\"  ? For example, for a person who is 1.75 m tall, the \"meters squared\" measurement is 1.75 m x 1.75 m, which is equal to 3.1 meters squared.  3. Divide the number of kilograms (your weight) by the meters squared number. In this example: 70 ÷ 3.1 = 22.6. This is your BMI.  What do the results mean?  BMI charts are used to identify whether you are underweight, normal weight, overweight, or obese. The following guidelines will be used:  · Underweight: BMI less than 18.5.  · Normal weight: BMI between 18.5 and 24.9.  · Overweight: BMI between 25 and 29.9.  · Obese: BMI of 30 or above.  Keep these notes in mind:  · Weight includes both fat and muscle, so someone with a muscular build, such as an athlete, may have a BMI that is higher than 24.9. In cases like these, BMI is not an accurate measure of body fat.  · To determine if excess body fat is the cause of a BMI of 25 or higher, further assessments may need to be done by a health care provider.  · BMI is usually interpreted in the same way for men and women.  Where to find more information  For more information about BMI, including tools to quickly calculate your BMI, go to these websites:  · Centers for Disease Control and Prevention: www.cdc.gov  · American Heart Association: www.heart.org  · National Heart, Lung, and Blood [place]: www.nhlbi.nih.gov  Summary  · Body mass index (BMI) is a number that is calculated from a person's weight and height.  · BMI may help estimate how much of a person's weight is composed of fat. BMI can help identify those who may be at higher risk for certain medical problems.  · BMI can be measured using English measurements or metric measurements.  · BMI charts are used to identify whether you are underweight, normal " "weight, overweight, or obese.  This information is not intended to replace advice given to you by your health care provider. Make sure you discuss any questions you have with your health care provider.  Document Revised: 09/09/2020 Document Reviewed: 07/17/2020  [employer] Patient Education © 2021 [employer]      https://www.nhlbi.nih.gov/files/docs/public/heart/dash_brief.pdf\">   DASH Eating Plan  DASH stands for Dietary Approaches to Stop Hypertension. The DASH eating plan is a healthy eating plan that has been shown to:  · Reduce high blood pressure (hypertension).  · Reduce your risk for type 2 diabetes, heart disease, and stroke.  · Help with weight loss.  What are tips for following this plan?  Reading food labels  · Check food labels for the amount of salt (sodium) per serving. Choose foods with less than 5 percent of the Daily Value of sodium. Generally, foods with less than 300 milligrams (mg) of sodium per serving fit into this eating plan.  · To find whole grains, look for the word \"whole\" as the first word in the ingredient list.  Shopping  · Buy products labeled as \"low-sodium\" or \"no salt added.\"  · Buy fresh foods. Avoid canned foods and pre-made or frozen meals.  Cooking  · Avoid adding salt when cooking. Use salt-free seasonings or herbs instead of table salt or sea salt. Check with your health care provider or pharmacist before using salt substitutes.  · Do not molina foods. Cook foods using healthy methods such as baking, boiling, grilling, roasting, and broiling instead.  · Cook with heart-healthy oils, such as olive, canola, avocado, soybean, or sunflower oil.  Meal planning    · Eat a balanced diet that includes:  ? 4 or more servings of fruits and 4 or more servings of vegetables each day. Try to fill one-half of your plate with fruits and vegetables.  ? 6-8 servings of whole grains each day.  ? Less than 6 oz (170 g) of lean meat, poultry, or fish each day. A 3-oz (85-g) serving of meat is " about the same size as a deck of cards. One egg equals 1 oz (28 g).  ? 2-3 servings of low-fat dairy each day. One serving is 1 cup (237 mL).  ? 1 serving of nuts, seeds, or beans 5 times each week.  ? 2-3 servings of heart-healthy fats. Healthy fats called omega-3 fatty acids are found in foods such as walnuts, flaxseeds, fortified milks, and eggs. These fats are also found in cold-water fish, such as sardines, salmon, and mackerel.  · Limit how much you eat of:  ? Canned or prepackaged foods.  ? Food that is high in trans fat, such as some fried foods.  ? Food that is high in saturated fat, such as fatty meat.  ? Desserts and other sweets, sugary drinks, and other foods with added sugar.  ? Full-fat dairy products.  · Do not salt foods before eating.  · Do not eat more than 4 egg yolks a week.  · Try to eat at least 2 vegetarian meals a week.  · Eat more home-cooked food and less restaurant, buffet, and fast food.    Lifestyle  · When eating at a restaurant, ask that your food be prepared with less salt or no salt, if possible.  · If you drink alcohol:  ? Limit how much you use to:  § 0-1 drink a day for women who are not pregnant.  § 0-2 drinks a day for men.  ? Be aware of how much alcohol is in your drink. In the [place] one drink equals one 12 oz bottle of beer (355 mL), one 5 oz glass of wine (148 mL), or one 1½ oz glass of hard liquor (44 mL).  General information  · Avoid eating more than 2,300 mg of salt a day. If you have hypertension, you may need to reduce your sodium intake to 1,500 mg a day.  · Work with your health care provider to maintain a healthy body weight or to lose weight. Ask what an ideal weight is for you.  · Get at least 30 minutes of exercise that causes your heart to beat faster (aerobic exercise) most days of the week. Activities may include walking, swimming, or biking.  · Work with your health care provider or dietitian to adjust your eating plan to your individual calorie needs.  What  foods should I eat?  Fruits  All fresh, dried, or frozen fruit. Canned fruit in natural juice (without added sugar).  Vegetables  Fresh or frozen vegetables (raw, steamed, roasted, or grilled). Low-sodium or reduced-sodium tomato and vegetable juice. Low-sodium or reduced-sodium tomato sauce and tomato paste. Low-sodium or reduced-sodium canned vegetables.  Grains  Whole-grain or whole-wheat bread. Whole-grain or whole-wheat pasta. Brown rice. Oatmeal. Quinoa. Bulgur. Whole-grain and low-sodium cereals. Rosie bread. Low-fat, low-sodium crackers. Whole-wheat flour tortillas.  Meats and other proteins  Skinless chicken or turkey. Ground chicken or turkey. Pork with fat trimmed off. Fish and seafood. Egg whites. Dried beans, peas, or lentils. Unsalted nuts, nut butters, and seeds. Unsalted canned beans. Lean cuts of beef with fat trimmed off. Low-sodium, lean precooked or cured meat, such as sausages or meat loaves.  Dairy  Low-fat (1%) or fat-free (skim) milk. Reduced-fat, low-fat, or fat-free cheeses. Nonfat, low-sodium ricotta or cottage cheese. Low-fat or nonfat yogurt. Low-fat, low-sodium cheese.  Fats and oils  Soft margarine without trans fats. Vegetable oil. Reduced-fat, low-fat, or light mayonnaise and salad dressings (reduced-sodium). Canola, safflower, olive, avocado, soybean, and sunflower oils. Avocado.  Seasonings and condiments  Herbs. Spices. Seasoning mixes without salt.  Other foods  Unsalted popcorn and pretzels. Fat-free sweets.  The items listed above may not be a complete list of foods and beverages you can eat. Contact a dietitian for more information.  What foods should I avoid?  Fruits  Canned fruit in a light or heavy syrup. Fried fruit. Fruit in cream or butter sauce.  Vegetables  Creamed or fried vegetables. Vegetables in a cheese sauce. Regular canned vegetables (not low-sodium or reduced-sodium). Regular canned tomato sauce and paste (not low-sodium or reduced-sodium). Regular tomato and  vegetable juice (not low-sodium or reduced-sodium). Pickles. Olives.  Grains  Baked goods made with fat, such as croissants, muffins, or some breads. Dry pasta or rice meal packs.  Meats and other proteins  Fatty cuts of meat. Ribs. Fried meat. Moran. Bologna, salami, and other precooked or cured meats, such as sausages or meat loaves. Fat from the back of a pig (fatback). Bratwurst. Salted nuts and seeds. Canned beans with added salt. Canned or smoked fish. Whole eggs or egg yolks. Chicken or turkey with skin.  Dairy  Whole or 2% milk, cream, and half-and-half. Whole or full-fat cream cheese. Whole-fat or sweetened yogurt. Full-fat cheese. Nondairy creamers. Whipped toppings. Processed cheese and cheese spreads.  Fats and oils  Butter. Stick margarine. Lard. Shortening. Ghee. Moran fat. Tropical oils, such as coconut, palm kernel, or palm oil.  Seasonings and condiments  Onion salt, garlic salt, seasoned salt, table salt, and sea salt. Worcestershire sauce. Tartar sauce. Barbecue sauce. Teriyaki sauce. Soy sauce, including reduced-sodium. Steak sauce. Canned and packaged gravies. Fish sauce. Oyster sauce. Cocktail sauce. Store-bought horseradish. Ketchup. Mustard. Meat flavorings and tenderizers. Bouillon cubes. Hot sauces. Pre-made or packaged marinades. Pre-made or packaged taco seasonings. Relishes. Regular salad dressings.  Other foods  Salted popcorn and pretzels.  The items listed above may not be a complete list of foods and beverages you should avoid. Contact a dietitian for more information.  Where to find more information  · National Heart, Lung, and Blood [place]: www.nhlbi.nih.gov  · American Heart Association: www.heart.org  · Academy of Nutrition and Dietetics: www.eatright.org  · National Kidney Foundation: www.kidney.org  Summary  · The DASH eating plan is a healthy eating plan that has been shown to reduce high blood pressure (hypertension). It may also reduce your risk for type 2 diabetes, heart  disease, and stroke.  · When on the DASH eating plan, aim to eat more fresh fruits and vegetables, whole grains, lean proteins, low-fat dairy, and heart-healthy fats.  · With the DASH eating plan, you should limit salt (sodium) intake to 2,300 mg a day. If you have hypertension, you may need to reduce your sodium intake to 1,500 mg a day.  · Work with your health care provider or dietitian to adjust your eating plan to your individual calorie needs.  This information is not intended to replace advice given to you by your health care provider. Make sure you discuss any questions you have with your health care provider.  Document Revised: 11/20/2020 Document Reviewed: 11/20/2020  [employer] Patient Education © 2021 [employer]

## 2021-12-28 ENCOUNTER — TELEPHONE (OUTPATIENT)
Dept: NEUROLOGY | Age: 51
End: 2021-12-28

## 2021-12-28 NOTE — TELEPHONE ENCOUNTER
Called and spoke with patient to let him know that I have him scheduled an appointment with Dr. Dimitri Gaytan after being setup on his D[facility] Sleep machine. Patient is aware of the appointment time/date.

## 2022-02-08 ENCOUNTER — OFFICE VISIT (OUTPATIENT)
Dept: NEUROLOGY | Age: 52
End: 2022-02-08
Payer: MEDICARE

## 2022-02-08 VITALS
DIASTOLIC BLOOD PRESSURE: 74 MMHG | HEART RATE: 69 BPM | HEIGHT: 72 IN | BODY MASS INDEX: 42.66 KG/M2 | WEIGHT: 315 LBS | SYSTOLIC BLOOD PRESSURE: 117 MMHG

## 2022-02-08 DIAGNOSIS — M47.22 OSTEOARTHRITIS OF SPINE WITH RADICULOPATHY, CERVICAL REGION: ICD-10-CM

## 2022-02-08 DIAGNOSIS — G47.33 OBSTRUCTIVE SLEEP APNEA: Primary | ICD-10-CM

## 2022-02-08 PROCEDURE — G8417 CALC BMI ABV UP PARAM F/U: HCPCS | Performed by: PSYCHIATRY & NEUROLOGY

## 2022-02-08 PROCEDURE — G8484 FLU IMMUNIZE NO ADMIN: HCPCS | Performed by: PSYCHIATRY & NEUROLOGY

## 2022-02-08 PROCEDURE — 99213 OFFICE O/P EST LOW 20 MIN: CPT | Performed by: PSYCHIATRY & NEUROLOGY

## 2022-02-08 PROCEDURE — G8427 DOCREV CUR MEDS BY ELIG CLIN: HCPCS | Performed by: PSYCHIATRY & NEUROLOGY

## 2022-02-08 PROCEDURE — 3017F COLORECTAL CA SCREEN DOC REV: CPT | Performed by: PSYCHIATRY & NEUROLOGY

## 2022-02-08 PROCEDURE — 1036F TOBACCO NON-USER: CPT | Performed by: PSYCHIATRY & NEUROLOGY

## 2022-02-08 NOTE — PROGRESS NOTES
[facility]  [address] [place] [address] Drive, [address] [facility] [address]  Toby Smith  Phone [phone]  Fax [phone]     [address], [address] Follow Up Encounter  22 11:29 AM CST    Information:   Patient Name: Micki Morales  :   1970  Age:   46 y.o. MRN:   [id number]  Account #:  [de-identified]  Today:  22    Provider: Luis Hewitt M.D. Chief Complaint:   Chief Complaint   Patient presents with    Sleep Apnea     pt states he is not having any problems at this time        Subjective: Micki Morales is a 46 y.o. man with a history of obstructive sleep apnea who is following up. He was recently reevaluated and received a new CPAP device. The PSG showed severe ALFREDA with an AHI of 75.2 and low oxygen saturation of 80%. He was set up with CPAP at 16cm. He is using the device nightly. He rests well. He has had some air swallowing.         Objective:     Past Medical History:  Past Medical History:   Diagnosis Date    Anemia     Ankle edema     Anxiety     Asthma     Autoimmune disorder ([address]     been screened but not positively diagnosed    CPAP (continuous positive airway pressure) dependence     12cm    Depression     Diverticulosis     left colon    Fatigue     Fatty liver     Headache(784.0)     cluster sinus    Hyperthyroidism     Obesity, Class III, BMI 40-49.9 (morbid obesity) (HCC)     Obsessive compulsive disorder     Obstructive sleep apnea     AHI:  19.4 per PSG, 2014    Pneumonia 1981    Proteinuria     PTSD (post-traumatic stress disorder)     Umbilical hernia     Umbilical pain     Unspecified sleep apnea     uses CPAP on room air       Past Surgical History:   Procedure Laterality Date    CARPAL TUNNEL RELEASE Left     CERVICAL FUSION      CHOLECYSTECTOMY  2018    DENTAL SURGERY  1986    HERNIA REPAIR      umbilical & supraumbilical hernia repair on 14    HIATAL HERNIA REPAIR      ROTATOR CUFF REPAIR Right     VASECTOMY  2001       Recent Hospitalizations  · None    Significant Injuries  · None    Habits  Blondwesly Joiner reports that he quit smoking about 28 years ago. His smoking use included cigarettes. He has a 4.00 pack-year smoking history. He quit smokeless tobacco use about 12 years ago. His smokeless tobacco use included snuff. He reports current alcohol use. He reports that he does not use drugs. Family History   Problem Relation Age of Onset    Diabetes Mother     High Blood Pressure Mother     Diabetes Father     Cancer Father         leukemia    Diabetes Brother         type 1       Social History  Michele French , lives in [place], [place], and is a student.     Medications:  Current Outpatient Medications   Medication Sig Dispense Refill    HYDROcodone-acetaminophen (NORCO) 7.5-325 MG per tablet hydrocodone 7.5 mg-acetaminophen 325 mg tablet      ipratropium-albuterol (DUONEB) 0.5-2.5 (3) MG/3ML SOLN nebulizer solution Inhale 3 mLs into the lungs every 4 hours as needed      mometasone (NASONEX) 50 MCG/ACT nasal spray 2 sprays by Nasal route daily      Omega-3 Fatty Acids (FISH OIL) 1000 MG CAPS Take by mouth daily (with breakfast)      calcium carbonate (OSCAL) 500 MG TABS tablet Take 500 mg by mouth daily      lisinopril (PRINIVIL;ZESTRIL) 10 MG tablet Take 10 mg by mouth daily      tiZANidine (ZANAFLEX) 4 MG tablet Take 4 mg by mouth nightly      Cholecalciferol (VITAMIN D3) 81455 units CAPS Take 10,000 Units by mouth       Levothyroxine Sodium (SYNTHROID PO) Take 0.5 mcg by mouth       methotrexate (RHEUMATREX) 2.5 MG chemo tablet Take by mouth once a week Indications: 10 PO Q Week       folic acid (FOLVITE) 1 MG tablet Take 1 mg by mouth daily      aspirin-acetaminophen-caffeine (EXCEDRIN MIGRAINE) 250-250-65 MG per tablet Take 1 tablet by mouth as needed for Headaches      albuterol sulfate  (90 Base) MCG/ACT inhaler Inhale 1-2 puffs into the lungs      montelukast (SINGULAIR) 10 MG tablet       albuterol (PROVENTIL) (2.5 MG/3ML) 0.083% nebulizer solution       sertraline (ZOLOFT) 100 MG tablet       azelastine (ASTELIN) 137 MCG/SPRAY nasal spray 1-2 sprays by Nasal route daily. Use in each nostril as directed       No current facility-administered medications for this visit. Allergies:   Allergies as of 02/08/2022 - Fully Reviewed 02/08/2022   Allergen Reaction Noted    Beta adrenergic blockers  10/16/2016    Gabapentin      Ibuprofen Other (See Comments) 12/21/2016    Medrol [methylprednisolone]  06/10/2014    Proton pump inhibitors  12/26/2017       Review of Systems:  Constitutional: negative for - chills and fever  Eyes:  negative for - visual disturbance and photophobia  HENMT: negative for - headaches and sinus pain  Respiratory: negative for - cough, hemoptysis, and shortness of breath  Cardiovascular: negative for - chest pain and palpitations  Gastrointestinal: negative for - blood in stools, constipation, diarrhea, nausea, and vomiting  Genito-Urinary: negative for - hematuria, urinary frequency, urinary urgency, and urinary retention  Musculoskeletal: negative for - joint pain, joint stiffness, and joint swelling  Hematological and Lymphatic: negative for - bleeding problems, abnormal bruising, and swollen lymph nodes  Endocrine:  negative for - polydipsia and polyphagia  Allergy/Immunology:  negative for - rhinorrhea, sinus congestion, hives  Integument:  negative for - negative for - rash, change in moles, new or changing lesions  Psychological: negative for - anxiety and depression  Neurological: negative for - memory loss, numbness/tingling, and weakness     PHYSICAL EXAMINATION:  Vitals:  /74   Pulse 69   Ht 6' (1.829 m)   Wt (!) 340 lb (154.2 kg)   BMI 46.11 kg/m²   General appearance:  Alert, well developed, well nourished, in no distress  HEENT:  normocephalic, atraumatic, sclera appear normal, no nasal abnormalities, no rhinorrhea, Ears appear normal, oral mucous membranes are moist without erythema, trachea midline, thyroid is normal, no lymphadenopathy or neck mass. III (soft palate, base of uvula visible). Cardiovascular:  Regular rate and rhythm without murmer. No peripheral edema, No cyanosis or clubbing. No carotid bruits. Pulmonary:  Lungs are clear to auscultation. Breathing appears normal, good expansion, normal effort without use of accessory muscles  Musculoskeletal:  Joints are arthritic. Integument:  No rash, erythema, or pallor  Psychiatric:  Mood, affect, and behavior appear normal      NEUROLOGIC EXAMINATION:  Mental Status:  alert, oriented to person, place, and time. Speech:  Clear without dysarthria or dysphonia  Language:  Fluent without aphasia  Cranial Nerves:   II Visual fields are full to confrontation   III,IV, VI Extraocular movements are full   VII Facial movements are symmetrical without weakness   VIII Hearing is intact   XII No tongue atrophy or fasciculations. Normal tongue protrusion. No tongue weakness  Motor:  Normal strength in both upper and lower extremities. Normal muscle tone and bulk. Deep tendon reflexes are intact and symmetrical in both upper and lower extremities. Weems's signs are absent bilaterally. There is no ankle clonus on either side. Sensation:  Sensation is intact to light touch and vibration in all extremities. Coordination:  Rapid alternating movements are normal in both upper and lower extremities. Finger to nose testing is unimpaired bilaterally. Gait:  Normal station and gait. Pertinent Diagnostic Studies:      CPAP download shows that he is 97% compliant with CPAP use at 16cm with residual AHI of 0.2. Assessment:       ICD-10-CM    1. Obstructive sleep apnea  G47.33    2. Osteoarthritis of spine with radiculopathy, cervical region  M47.22    He is objectively compliant with and clinically benefiting from CPAP use.   I discussed the diagnosis of obstructive sleep apnea with Hector Pagan including the pathophysiology (namely the mechanism of breathing and obstruction of upper airway, interruptions of sleep, hypoxemia, hypercapnia, and results of repetitive sympathetic activation), risks, evaluation, and treatment options. I discussed the risks of driving when drowsy and advised that Hector Pagan not drive when drowsy and avoid sedating medications and respiratory suppressants. Plan:   1. Continue CPAP during sleep. Can reduce the pressure if he needs for air swallowing. I discussed positional remedies as well.   2. FU in a year    Electronically signed by Ann-Marie Scott MD on 2/8/22

## 2022-03-01 ENCOUNTER — TRANSCRIBE ORDERS (OUTPATIENT)
Dept: ADMINISTRATIVE | Facility: HOSPITAL | Age: 52
End: 2022-03-01

## 2022-03-01 DIAGNOSIS — M75.112 NONTRAUMATIC INCOMPLETE TEAR OF LEFT ROTATOR CUFF: Primary | ICD-10-CM

## 2022-03-11 ENCOUNTER — HOSPITAL ENCOUNTER (OUTPATIENT)
Dept: MRI IMAGING | Facility: HOSPITAL | Age: 52
Discharge: HOME | End: 2022-03-11
Admitting: ORTHOPAEDIC SURGERY

## 2022-03-11 DIAGNOSIS — M75.112 NONTRAUMATIC INCOMPLETE TEAR OF LEFT ROTATOR CUFF: ICD-10-CM

## 2022-03-11 PROCEDURE — 73221 MRI JOINT UPR EXTREM W/O DYE: CPT

## 2022-07-25 NOTE — PROGRESS NOTES
[facility] - PODIATRY    Today's Date: 07/26/2022     Patient Name: Jose Luis Ziegler  MRN: [id number]  CSN: [id number]  PCP: Nicole Gomez APRN  Referring Provider: Nicole Gomez APRN    SUBJECTIVE     Chief Complaint   Patient presents with   • Establish Care     Nicole Gomez APRN  NEW PT. Calcaneal spur, right foot- - XRAY RIGHT FOOT- pt states right foot heel spurs, some on bottom that dont bother him much, one large one on back of heel at achillis insertion, written xray report in file, had for about 8 months- pt pain 10/10 at worst, short periods, typically 8/10- pt presents with calluses inside both great toes, no other visible issues     HPI: Jose Luis Ziegler, a 52 y.o.male, comes to clinic as a(n) new patient complaining of right heel pain. Patient has h/o arthritis, asthma, claustrophobia, fibromyalgia, gastritis, GERD, HTN, Hypothyroid, OCD, OA, PTSD, RA, Sleep Apnea, Spinal stenosis. States that for the past 7-8 months he has had pain to his right posterior heel. Notes a bump at the site. Denies injury or trauma. It has slowly worsened over time. Admits pain at 8/10 level and described as aching, nagging and throbbing. Denies previous treatment. Denies any constitutional symptoms. No other pedal complaints at this time.    Past Medical History:   Diagnosis Date   • Anemia    • Arthritis    • Asthma    • Candida infection    • Claustrophobia    • Cubital tunnel syndrome     BUE   • Fibromyalgia    • Gastritis    • GERD (gastroesophageal reflux disease)    • Gluten intolerance    • Gluten intolerance    • Hernia    • Hypertension    • Hypothyroidism    • OCD (obsessive compulsive disorder)    • OCD (obsessive compulsive disorder)    • Osteoarthritis    • Peptic ulcer     AND DUODENAL   • PTSD (post-traumatic stress disorder)    • Reflux gastritis    • Rheumatoid arthritis (HCC)    • Sleep apnea     uses a c-pap   • Spinal stenosis     injections in cervical area approx  every 3 months.    • Thrush      Past Surgical History:   Procedure Laterality Date   • ANTERIOR CERVICAL DISCECTOMY W/ FUSION N/A 3/31/2021    Procedure: CERVICAL DISCECTOMY ANTERIOR INTERBODY  FUSION, ANTERIOR CERVICAL INSTRUMENTATION C4-5,5-6,6-7;  Surgeon: Antony Elmore MD;  Location: [facility];  Service: Neurosurgery;  Laterality: N/A;   • CARPAL TUNNEL RELEASE Left    • CARPAL TUNNEL RELEASE Right 2018    Procedure: RIGHT CARPAL TUNNEL RELEASE;  Surgeon: Derrell Pritchard MD;  Location: [facility] OR;  Service:    • CHOLECYSTECTOMY     • LIPOMA EXCISION     • ROTATOR CUFF REPAIR Right    • UMBILICAL HERNIA REPAIR      with mesh   • VASECTOMY       Family History   Problem Relation Age of Onset   • Diabetes Mother    • Hypertension Mother    • Diabetes Sister      Social History     Socioeconomic History   • Marital status:    Tobacco Use   • Smoking status: Former Smoker     Packs/day: 1.00     Years: 6.00     Pack years: 6.00     Types: Cigarettes     Quit date: 1994     Years since quittin.6   • Smokeless tobacco: Former User     Types: Snuff     Quit date: 2009   Vaping Use   • Vaping Use: Never used   Substance and Sexual Activity   • Alcohol use: Yes     Alcohol/week: 3.0 standard drinks     Types: 3 Cans of beer per week   • Drug use: No   • Sexual activity: Defer     Allergies   Allergen Reactions   • Gabapentin Other (See Comments)     SUICIDAL THOUGHTS   • Methylprednisolone Other (See Comments)     intolerace to medrol dose cesar - make me suicidal  Other reaction(s): Other   • Adhesive Tape Rash   • Aspartame Other (See Comments)     Fatigue, fibromyalgia flare up   • Beta Adrenergic Blockers GI Intolerance   • Ibuprofen Other (See Comments)     Causes ulcers   • Proton Pump Inhibitors Other (See Comments)     MUSCLE CRAMPS     • Tape Rash     Current Outpatient Medications   Medication Sig Dispense Refill   • acetaminophen (TYLENOL) 500 MG tablet Take 1,000 mg  by mouth 2 (two) times a day.     • albuterol (PROVENTIL HFA;VENTOLIN HFA) 108 (90 BASE) MCG/ACT inhaler Inhale 1-2 puffs Every 6 (Six) Hours. prn     • azelastine (ASTELIN) 0.1 % nasal spray 1 spray into the nostril(s) as directed by provider Daily. prn     • Calcium Carbonate (SUPER CALCIUM PO) Take 1 tablet by mouth Daily.     • Cholecalciferol (VITAMIN D3) 5000 units capsule capsule Take 5,000 Units by mouth 2 (Two) Times a Day.     • famotidine (PEPCID) 40 MG tablet Take 40 mg by mouth Daily.     • folic acid (FOLVITE) 1 MG tablet Take 1 mg by mouth Daily.     • HYDROcodone-acetaminophen (NORCO) 7.5-325 MG per tablet hydrocodone 7.5 mg-acetaminophen 325 mg tablet     • ipratropium-albuterol (DUO-NEB) 0.5-2.5 mg/3 ml nebulizer Take 3 mL by nebulization Every 4 (Four) Hours As Needed for Wheezing.     • lamoTRIgine (LaMICtal) 25 MG tablet every night at bedtime.     • levothyroxine (SYNTHROID, LEVOTHROID) 50 MCG tablet Take 50 mcg by mouth Daily.     • lisinopril (PRINIVIL,ZESTRIL) 10 MG tablet Take 10 mg by mouth Daily.     • methotrexate 2.5 MG tablet Take  by mouth 1 (One) Time Per Week. Takes on Sunday.     • mometasone (NASONEX) 50 MCG/ACT nasal spray 2 sprays into the nostril(s) as directed by provider Daily.     • montelukast (SINGULAIR) 10 MG tablet Every Night.     • sertraline (ZOLOFT) 100 MG tablet      • Ascorbic Acid (Vitamin C) 500 MG chewable tablet Chew Daily.     • aspirin 81 MG EC tablet Take 81 mg by mouth Daily.     • Breo Ellipta 200-25 MCG/INH inhaler Inhale 1 puff Every Night.     • diclofenac (CATAFLAM) 50 MG tablet Take 1 tablet by mouth 2 (Two) Times a Day. 60 tablet 0   • MAGNESIUM OXIDE -MG SUPPLEMENT PO Take 1 tablet by mouth Daily.     • meclizine (ANTIVERT) 25 MG tablet Every 8 (Eight) Hours.     • Menaquinone-7 (VITAMIN K2 PO) Take 1 tablet by mouth Daily.     • metoclopramide (REGLAN) 10 MG tablet Take 10 mg by mouth 3 (Three) Times a Day As Needed.     • sucralfate (CARAFATE) 1 g  tablet Take 1 g by mouth 4 (Four) Times a Day.       No current facility-administered medications for this visit.     Review of Systems   Constitutional: Negative for chills and fever.   HENT: Negative for congestion.    Respiratory: Negative for shortness of breath.    Cardiovascular: Negative for chest pain and leg swelling.   Gastrointestinal: Negative for constipation, diarrhea, nausea and vomiting.   Musculoskeletal: Positive for arthralgias and gait problem.        Foot pain   Skin: Negative for wound.   Neurological: Negative for numbness.       OBJECTIVE     Vitals:    07/26/22 0956   BP: 158/88   Pulse: 74   SpO2: 97%       PHYSICAL EXAM  GEN:   Accompanied by none.     Foot/Ankle Exam:       General:   Appearance: appears stated age and healthy and obesity    Orientation: AAOx3    Affect: appropriate    Gait: unimpaired    Assistance: independent    Shoe Gear:  Casual shoes    VASCULAR      Right Foot Vascularity   Dorsalis pedis:  2+  Posterior tibial:  2+  Skin Temperature: warm    Edema Grading:  None  CFT:  3  Pedal Hair Growth:  Present  Varicosities: none       Left Foot Vascularity   Dorsalis pedis:  2+  Posterior tibial:  2+  Skin Temperature: warm    Edema Grading:  None  CFT:  3  Pedal Hair Growth:  Present  Varicosities: none        NEUROLOGIC     Right Foot Neurologic   Normal sensation    Light touch sensation:  Normal  Vibratory sensation:  Normal  Hot/Cold sensation: normal    Protective Sensation using [place] Monofilament:  10     Left Foot Neurologic   Normal sensation    Light touch sensation:  Normal  Vibratory sensation:  Normal  Hot/cold sensation: normal    Protective Sensation using [place] Monofilament:  10     MUSCULOSKELETAL      Right Foot Musculoskeletal   Ecchymosis:  None  Tenderness: posterior heel    Arch:  Normal     Left Foot Musculoskeletal   Ecchymosis:  None  Tenderness: none    Arch:  Normal     MUSCLE STRENGTH     Right Foot Muscle Strength   Foot  dorsiflexion:  5  Foot plantar flexion:  5  Foot inversion:  5  Foot eversion:  5     Left Foot Muscle Strength   Foot dorsiflexion:  5  Foot plantar flexion:  5  Foot inversion:  5  Foot eversion:  5     RANGE OF MOTION      Right Foot Range of Motion   Ankle dorsiflexion:  0     Left Foot Range of Motion   Ankle dorsiflexion:  5     DERMATOLOGIC     Right Foot Dermatologic   Skin: skin intact       Left Foot Dermatologic   Skin: skin intact       Image:       RADIOLOGY/NUCLEAR:  No results found.    LABORATORY/CULTURE RESULTS:      PATHOLOGY RESULTS:       ASSESSMENT/PLAN     Diagnoses and all orders for this visit:    1. Retrocalcaneal exostosis (Primary)  -     XR Calcaneus 2+ View Right; Future    2. Foot pain, right    3. Morbid obesity with BMI of 45.0-49.9, adult (HCC)    Other orders  -     diclofenac (CATAFLAM) 50 MG tablet; Take 1 tablet by mouth 2 (Two) Times a Day.  Dispense: 60 tablet; Refill: 0      Comprehensive lower extremity examination and evaluation was performed.  Discussed findings and treatment plan including risks, benefits, and treatment options with patient in detail. Patient agreed with treatment plan.  Findings consistent with retrocalcaneal exostosis.    Xrays to evaluate further.  Initially will remain conservative with immobilization in CAM, NSAIDs and resting of achilles.   Discussed surgery if conservative therapy fails.   An After Visit Summary was printed and given to the patient at discharge, including (if requested) any available informative/educational handouts regarding diagnosis, treatment, or medications. All questions were answered to patient/family satisfaction. Should symptoms fail to improve or worsen they agree to call or return to clinic or to go to the Emergency Department. Discussed the importance of following up with any needed screening tests/labs/specialist appointments and any requested follow-up recommended by me today. Importance of maintaining follow-up  discussed and patient accepts that missed appointments can delay diagnosis and potentially lead to worsening of conditions.  Return in about 1 month (around 8/26/2022) for Recheck, Follow-up with Dr. Rollins., or sooner if acute issues arise.    Lab Frequency Next Occurrence   Follow Anesthesia Guidelines / Standing Orders Once 01/17/2021   Provide NPO Instructions to Patient Once 01/17/2021   Chlorhexidine Skin Prep Once 01/17/2021   XR Knee 3 View Right Once 12/18/2021       This document has been electronically signed by Oren Rollins DPM on July 26, 2022 10:23 CDT

## 2022-07-26 ENCOUNTER — OFFICE VISIT (OUTPATIENT)
Dept: PODIATRY | Facility: CLINIC | Age: 52
End: 2022-07-26

## 2022-07-26 ENCOUNTER — APPOINTMENT (OUTPATIENT)
Dept: GENERAL RADIOLOGY | Facility: HOSPITAL | Age: 52
End: 2022-07-26

## 2022-07-26 ENCOUNTER — HOSPITAL ENCOUNTER (OUTPATIENT)
Dept: GENERAL RADIOLOGY | Facility: HOSPITAL | Age: 52
Discharge: HOME | End: 2022-07-26
Admitting: PODIATRIST

## 2022-07-26 ENCOUNTER — PATIENT ROUNDING (BHMG ONLY) (OUTPATIENT)
Dept: PODIATRY | Facility: CLINIC | Age: 52
End: 2022-07-26

## 2022-07-26 VITALS
DIASTOLIC BLOOD PRESSURE: 88 MMHG | BODY MASS INDEX: 41.75 KG/M2 | SYSTOLIC BLOOD PRESSURE: 158 MMHG | HEIGHT: 73 IN | OXYGEN SATURATION: 97 % | HEART RATE: 74 BPM | WEIGHT: 315 LBS

## 2022-07-26 DIAGNOSIS — M79.671 FOOT PAIN, RIGHT: ICD-10-CM

## 2022-07-26 DIAGNOSIS — M89.8X7 RETROCALCANEAL EXOSTOSIS: Primary | ICD-10-CM

## 2022-07-26 DIAGNOSIS — E66.01 MORBID OBESITY WITH BMI OF 45.0-49.9, ADULT: ICD-10-CM

## 2022-07-26 DIAGNOSIS — M89.8X7 RETROCALCANEAL EXOSTOSIS: ICD-10-CM

## 2022-07-26 PROCEDURE — 99203 OFFICE O/P NEW LOW 30 MIN: CPT | Performed by: PODIATRIST

## 2022-07-26 PROCEDURE — 73650 X-RAY EXAM OF HEEL: CPT

## 2022-07-26 RX ORDER — DICLOFENAC POTASSIUM 50 MG/1
50 TABLET, FILM COATED ORAL 2 TIMES DAILY
Qty: 60 TABLET | Refills: 0 | Status: SHIPPED | OUTPATIENT
Start: 2022-07-26

## 2022-07-26 NOTE — PROGRESS NOTES
July 26, 2022    Hello, may I speak with Jose Luis Ziegler?    My name is Sara      I am  with [facility] [place] [place] [facility]  [address], [address]  [place] [address] [phone].    Before we get started may I verify your date of birth? 1970    I am calling to officially welcome you to our practice and ask about your recent visit. Is this a good time to talk? yes    Tell me about your visit with us. What things went well?  It all went pretty good.       We're always looking for ways to make our patients' experiences even better. Do you have recommendations on ways we may improve?  no    Overall were you satisfied with your first visit to our practice? Yes, everyone was pretty cool.       I appreciate you taking the time to speak with me today. Is there anything else I can do for you? no      Thank you, and have a great day.

## 2022-07-27 ENCOUNTER — TELEPHONE (OUTPATIENT)
Dept: PODIATRY | Facility: CLINIC | Age: 52
End: 2022-07-27

## 2022-08-29 NOTE — PROGRESS NOTES
[facility] - PODIATRY    Today's Date: 08/30/2022     Patient Name: Jose Luis Ziegler  MRN: [id number]  [facility]: [id number]  PCP: Nicole Gomez APRN  Referring Provider: No ref. provider found    SUBJECTIVE     Chief Complaint   Patient presents with   • Follow-up     Nicole Gomez APRN pcp03/26/2021 1 month follow up - pt states having pain in right back of ankle, has gotten a little better and as long as taking medication and being careful pain does decrease - pt pain 3/10 will stay the same throughout the day - pt presents 1 month follow up with right back of ankle pain, no other visible siting      HPI: Jose Luis Ziegler, a 52 y.o.male, comes to clinic as a(n) established patient for follow-up treatment of retrocalcaneal exostosis. Patient has h/o arthritis, asthma, claustrophobia, fibromyalgia, gastritis, GERD, HTN, Hypothyroid, OCD, OA, PTSD, RA, Sleep Apnea, Spinal stenosis. Patient presents for 1 month follow-up for retrocalcaneal exostosis. States that he wore CAM for around 2 weeks and was unable to tolerate more due to back pain. Has been taking NSAIDs and feels like it has been helping also. Feels like with previous treatment symptoms have improved considerably. Admits pain at 3/10 level and described as aching, nagging and throbbing. States that he walked 2 miles this morning. Relates previous treatment(s) including CAM. Denies any constitutional symptoms. No other pedal complaints at this time.    Past Medical History:   Diagnosis Date   • Anemia    • Arthritis    • Asthma    • Candida infection    • Claustrophobia    • Cubital tunnel syndrome     BUE   • Fibromyalgia    • Gastritis    • GERD (gastroesophageal reflux disease)    • Gluten intolerance    • Gluten intolerance    • Hernia    • Hypertension    • Hypothyroidism    • OCD (obsessive compulsive disorder)    • OCD (obsessive compulsive disorder)    • Osteoarthritis    • Peptic ulcer     AND DUODENAL   • PTSD  (post-traumatic stress disorder)    • Reflux gastritis    • Rheumatoid arthritis (HCC)    • Sleep apnea     uses a c-pap   • Spinal stenosis     injections in cervical area approx every 3 months.    • Thrush      Past Surgical History:   Procedure Laterality Date   • ANTERIOR CERVICAL DISCECTOMY W/ FUSION N/A 3/31/2021    Procedure: CERVICAL DISCECTOMY ANTERIOR INTERBODY  FUSION, ANTERIOR CERVICAL INSTRUMENTATION C4-5,5-6,6-7;  Surgeon: Antony Elmore MD;  Location:  [facility] OR;  Service: Neurosurgery;  Laterality: N/A;   • CARPAL TUNNEL RELEASE Left    • CARPAL TUNNEL RELEASE Right 2018    Procedure: RIGHT CARPAL TUNNEL RELEASE;  Surgeon: Derrell Pritchard MD;  Location:  [facility] OR;  Service:    • CHOLECYSTECTOMY     • LIPOMA EXCISION     • ROTATOR CUFF REPAIR Right    • UMBILICAL HERNIA REPAIR      with mesh   • VASECTOMY       Family History   Problem Relation Age of Onset   • Diabetes Mother    • Hypertension Mother    • Diabetes Sister      Social History     Socioeconomic History   • Marital status:    Tobacco Use   • Smoking status: Former Smoker     Packs/day: 1.00     Years: 6.00     Pack years: 6.00     Types: Cigarettes     Quit date: 1994     Years since quittin.6   • Smokeless tobacco: Former User     Types: Snuff     Quit date: 2009   Vaping Use   • Vaping Use: Never used   Substance and Sexual Activity   • Alcohol use: Yes     Alcohol/week: 3.0 standard drinks     Types: 3 Cans of beer per week   • Drug use: No   • Sexual activity: Defer     Allergies   Allergen Reactions   • Gabapentin Other (See Comments)     SUICIDAL THOUGHTS   • Methylprednisolone Other (See Comments)     intolerace to medrol dose cesar - make me suicidal  Other reaction(s): Other   • Adhesive Tape Rash   • Aspartame Other (See Comments)     Fatigue, fibromyalgia flare up   • Beta Adrenergic Blockers GI Intolerance   • Ibuprofen Other (See Comments)     Causes ulcers   • Proton Pump Inhibitors  Other (See Comments)     MUSCLE CRAMPS     • Tape Rash     Current Outpatient Medications   Medication Sig Dispense Refill   • acetaminophen (TYLENOL) 500 MG tablet Take 1,000 mg by mouth 2 (two) times a day.     • albuterol (PROVENTIL HFA;VENTOLIN HFA) 108 (90 BASE) MCG/ACT inhaler Inhale 1-2 puffs Every 6 (Six) Hours. prn     • azelastine (ASTELIN) 0.1 % nasal spray 1 spray into the nostril(s) as directed by provider Daily. prn     • Calcium Carbonate (SUPER CALCIUM PO) Take 1 tablet by mouth Daily.     • Cholecalciferol (VITAMIN D3) 5000 units capsule capsule Take 5,000 Units by mouth 2 (Two) Times a Day.     • diclofenac (CATAFLAM) 50 MG tablet Take 1 tablet by mouth 2 (Two) Times a Day. 60 tablet 0   • famotidine (PEPCID) 40 MG tablet Take 40 mg by mouth Daily.     • folic acid (FOLVITE) 1 MG tablet Take 1 mg by mouth Daily.     • HYDROcodone-acetaminophen (NORCO) 7.5-325 MG per tablet hydrocodone 7.5 mg-acetaminophen 325 mg tablet     • ipratropium-albuterol (DUO-NEB) 0.5-2.5 mg/3 ml nebulizer Take 3 mL by nebulization Every 4 (Four) Hours As Needed for Wheezing.     • lamoTRIgine (LaMICtal) 25 MG tablet every night at bedtime.     • levothyroxine (SYNTHROID, LEVOTHROID) 50 MCG tablet Take 50 mcg by mouth Daily.     • lisinopril (PRINIVIL,ZESTRIL) 10 MG tablet Take 10 mg by mouth Daily.     • methotrexate 2.5 MG tablet Take  by mouth 1 (One) Time Per Week. Takes on Sunday.     • mometasone (NASONEX) 50 MCG/ACT nasal spray 2 sprays into the nostril(s) as directed by provider Daily.     • montelukast (SINGULAIR) 10 MG tablet Every Night.     • sertraline (ZOLOFT) 100 MG tablet      • Ascorbic Acid (Vitamin C) 500 MG chewable tablet Chew Daily.     • aspirin 81 MG EC tablet Take 81 mg by mouth Daily.     • Breo Ellipta 200-25 MCG/INH inhaler Inhale 1 puff Every Night.     • MAGNESIUM OXIDE -MG SUPPLEMENT PO Take 1 tablet by mouth Daily.     • meclizine (ANTIVERT) 25 MG tablet Every 8 (Eight) Hours.     •  Menaquinone-7 (VITAMIN K2 PO) Take 1 tablet by mouth Daily.     • metoclopramide (REGLAN) 10 MG tablet Take 10 mg by mouth 3 (Three) Times a Day As Needed.     • sucralfate (CARAFATE) 1 g tablet Take 1 g by mouth 4 (Four) Times a Day.       No current facility-administered medications for this visit.     Review of Systems   Constitutional: Negative for chills and fever.   HENT: Negative for congestion.    Respiratory: Negative for shortness of breath.    Cardiovascular: Negative for chest pain and leg swelling.   Gastrointestinal: Negative for constipation, diarrhea, nausea and vomiting.   Musculoskeletal: Positive for arthralgias (improved). Negative for gait problem.        Foot pain   Skin: Negative for wound.   Neurological: Negative for numbness.       OBJECTIVE     Vitals:    08/30/22 0926   BP: 137/80       PHYSICAL EXAM  GEN:   Accompanied by none.     Foot/Ankle Exam:       General:   Appearance: appears stated age and healthy and obesity    Orientation: AAOx3    Affect: appropriate    Gait: unimpaired    Assistance: independent    Shoe Gear:  Casual shoes    VASCULAR      Right Foot Vascularity   Dorsalis pedis:  2+  Posterior tibial:  2+  Skin Temperature: warm    Edema Grading:  None  CFT:  3  Pedal Hair Growth:  Present  Varicosities: none       Left Foot Vascularity   Dorsalis pedis:  2+  Posterior tibial:  2+  Skin Temperature: warm    Edema Grading:  None  CFT:  3  Pedal Hair Growth:  Present  Varicosities: none        NEUROLOGIC     Right Foot Neurologic   Normal sensation    Light touch sensation:  Normal  Vibratory sensation:  Normal  Hot/Cold sensation: normal    Protective Sensation using [place]ev[place] Monofilament:  10     Left Foot Neurologic   Normal sensation    Light touch sensation:  Normal  Vibratory sensation:  Normal  Hot/cold sensation: normal    Protective Sensation using [place]evan Monofilament:  10     MUSCULOSKELETAL      Right Foot Musculoskeletal   Ecchymosis:   None  Tenderness: posterior heel    Tenderness comment:  Improved  Arch:  Normal     Left Foot Musculoskeletal   Ecchymosis:  None  Tenderness: none    Arch:  Normal     MUSCLE STRENGTH     Right Foot Muscle Strength   Foot dorsiflexion:  5  Foot plantar flexion:  5  Foot inversion:  5  Foot eversion:  5     Left Foot Muscle Strength   Foot dorsiflexion:  5  Foot plantar flexion:  5  Foot inversion:  5  Foot eversion:  5     RANGE OF MOTION      Right Foot Range of Motion   Ankle dorsiflexion:  0     Left Foot Range of Motion   Ankle dorsiflexion:  5     DERMATOLOGIC     Right Foot Dermatologic   Skin: skin intact       Left Foot Dermatologic   Skin: skin intact       Image:       RADIOLOGY/NUCLEAR:  No results found.    LABORATORY/CULTURE RESULTS:      PATHOLOGY RESULTS:       ASSESSMENT/PLAN     Diagnoses and all orders for this visit:    1. Retrocalcaneal exostosis (Primary)    2. Foot pain, right    3. Morbid obesity with BMI of 45.0-49.9, adult (HCC)      Comprehensive lower extremity examination and evaluation was performed.  Discussed findings and treatment plan including risks, benefits, and treatment options with patient in detail. Patient agreed with treatment plan.  Findings consistent with retrocalcaneal exostosis.    Reviewed xrays with patient.   Has had improvement with conservative therapy. Continue treatment until symptoms resolve or pain worsens.   An After Visit Summary was printed and given to the patient at discharge, including (if requested) any available informative/educational handouts regarding diagnosis, treatment, or medications. All questions were answered to patient/family satisfaction. Should symptoms fail to improve or worsen they agree to call or return to clinic or to go to the Emergency Department. Discussed the importance of following up with any needed screening tests/labs/specialist appointments and any requested follow-up recommended by me today. Importance of maintaining  follow-up discussed and patient accepts that missed appointments can delay diagnosis and potentially lead to worsening of conditions.  Return if symptoms worsen or fail to improve., or sooner if acute issues arise.    Lab Frequency Next Occurrence   Follow Anesthesia Guidelines / Standing Orders Once 01/17/2021   Provide NPO Instructions to Patient Once 01/17/2021   Chlorhexidine Skin Prep Once 01/17/2021   XR Knee 3 View Right Once 12/18/2021       This document has been electronically signed by Oren Rollins DPM on August 30, 2022 09:34 CDT

## 2022-08-30 ENCOUNTER — OFFICE VISIT (OUTPATIENT)
Dept: PODIATRY | Facility: CLINIC | Age: 52
End: 2022-08-30

## 2022-08-30 VITALS
BODY MASS INDEX: 41.75 KG/M2 | WEIGHT: 315 LBS | DIASTOLIC BLOOD PRESSURE: 80 MMHG | SYSTOLIC BLOOD PRESSURE: 137 MMHG | HEIGHT: 73 IN

## 2022-08-30 DIAGNOSIS — M79.671 FOOT PAIN, RIGHT: ICD-10-CM

## 2022-08-30 DIAGNOSIS — M89.8X7 RETROCALCANEAL EXOSTOSIS: Primary | ICD-10-CM

## 2022-08-30 DIAGNOSIS — E66.01 MORBID OBESITY WITH BMI OF 45.0-49.9, ADULT: ICD-10-CM

## 2022-08-30 PROCEDURE — 99212 OFFICE O/P EST SF 10 MIN: CPT | Performed by: PODIATRIST

## 2023-02-14 ENCOUNTER — OFFICE VISIT (OUTPATIENT)
Dept: NEUROLOGY | Age: 53
End: 2023-02-14

## 2023-02-14 VITALS
DIASTOLIC BLOOD PRESSURE: 75 MMHG | HEIGHT: 72 IN | SYSTOLIC BLOOD PRESSURE: 121 MMHG | HEART RATE: 69 BPM | WEIGHT: 315 LBS | BODY MASS INDEX: 42.66 KG/M2 | OXYGEN SATURATION: 98 %

## 2023-02-14 DIAGNOSIS — G47.33 OBSTRUCTIVE SLEEP APNEA: Primary | ICD-10-CM

## 2023-02-14 DIAGNOSIS — G44.019 EPISODIC CLUSTER HEADACHE, NOT INTRACTABLE: ICD-10-CM

## 2023-02-14 DIAGNOSIS — M47.22 OSTEOARTHRITIS OF SPINE WITH RADICULOPATHY, CERVICAL REGION: ICD-10-CM

## 2023-02-14 DIAGNOSIS — G56.23 CUBITAL TUNNEL SYNDROME, BILATERAL: ICD-10-CM

## 2023-02-14 DIAGNOSIS — G56.03 BILATERAL CARPAL TUNNEL SYNDROME: ICD-10-CM

## 2023-02-14 NOTE — PROGRESS NOTES
[id number] [facility]  [address] [place] [address] Drive, [address] [facility] [address]  [facility], [facility]  Phone [phone]  Fax [phone]     [address], [address] Follow Up Encounter  23 10:55 AM CST    Information:   Patient Name: Luther Lloyd  :   1970  Age:   46 y.o. MRN:   [id number]  Account #:  [de-identified]  Today:  23    Provider: Anthony Blount M.D. Chief Complaint:   Chief Complaint   Patient presents with    Sleep Apnea       Subjective: Luther Lloyd is a 46 y.o. man with a history of obstructive sleep apnea who is following up. He continues to use his CPAP nightly. He tolerates it. He sleeps well. He had no daytime drowsiness. Objective:     Past Medical History:  Past Medical History:   Diagnosis Date    Anemia     Ankle edema     Anxiety     Asthma     Autoimmune disorder ([facility] [address]     been screened but not positively diagnosed    CPAP (continuous positive airway pressure) dependence     12cm    Depression     Diverticulosis     left colon    Fatigue     Fatty liver     Headache(784.0)     cluster sinus    Hyperthyroidism     Obesity, Class III, BMI 40-49.9 (morbid obesity) ([address]     Obsessive compulsive disorder     Obstructive sleep apnea     AHI:  19.4 per PSG,     Pneumonia 1981    Proteinuria     PTSD (post-traumatic stress disorder)     Umbilical hernia     Umbilical pain     Unspecified sleep apnea     uses CPAP on room air       Past Surgical History:   Procedure Laterality Date    CARPAL TUNNEL RELEASE Left     CERVICAL FUSION      CHOLECYSTECTOMY  2018    DENTAL SURGERY  1986    HERNIA REPAIR      umbilical & supraumbilical hernia repair on 14    HIATAL HERNIA REPAIR      ROTATOR CUFF REPAIR Right     VASECTOMY         Recent Hospitalizations  None    Significant Injuries  None    Habits  Luther Lloyd reports that he quit smoking about 29 years ago. His smoking use included cigarettes. He has a 4.00 pack-year smoking history.  He quit smokeless tobacco use about 13 years ago. His smokeless tobacco use included snuff. He reports current alcohol use. He reports that he does not use drugs. Family History   Problem Relation Age of Onset    Diabetes Mother     High Blood Pressure Mother     Diabetes Father     Cancer Father         leukemia    Diabetes Brother         type 1       Social History  aSmara Laboy is , lives in [place], [place], and is a student. Medications:  Current Outpatient Medications   Medication Sig Dispense Refill    HYDROcodone-acetaminophen (NORCO) 7.5-325 MG per tablet hydrocodone 7.5 mg-acetaminophen 325 mg tablet      ipratropium-albuterol (DUONEB) 0.5-2.5 (3) MG/3ML SOLN nebulizer solution Inhale 3 mLs into the lungs every 4 hours as needed      mometasone (NASONEX) 50 MCG/ACT nasal spray 2 sprays by Nasal route daily      Omega-3 Fatty Acids (FISH OIL) 1000 MG CAPS Take by mouth daily (with breakfast)      calcium carbonate (OSCAL) 500 MG TABS tablet Take 500 mg by mouth daily      lisinopril (PRINIVIL;ZESTRIL) 10 MG tablet Take 10 mg by mouth daily      tiZANidine (ZANAFLEX) 4 MG tablet Take 4 mg by mouth nightly      Cholecalciferol (VITAMIN D3) 57505 units CAPS Take 10,000 Units by mouth       Levothyroxine Sodium (SYNTHROID PO) Take 0.5 mcg by mouth       methotrexate (RHEUMATREX) 2.5 MG chemo tablet Take by mouth once a week Indications: 10 PO Q Week       folic acid (FOLVITE) 1 MG tablet Take 1 mg by mouth daily      aspirin-acetaminophen-caffeine (EXCEDRIN MIGRAINE) 250-250-65 MG per tablet Take 1 tablet by mouth as needed for Headaches      albuterol sulfate  (90 Base) MCG/ACT inhaler Inhale 1-2 puffs into the lungs      montelukast (SINGULAIR) 10 MG tablet       albuterol (PROVENTIL) (2.5 MG/3ML) 0.083% nebulizer solution       sertraline (ZOLOFT) 100 MG tablet       azelastine (ASTELIN) 137 MCG/SPRAY nasal spray 1-2 sprays by Nasal route daily.  Use in each nostril as directed       No current facility-administered medications for this visit. Allergies: Allergies as of 02/14/2023 - Fully Reviewed 02/14/2023   Allergen Reaction Noted    Beta adrenergic blockers  10/16/2016    Gabapentin      Ibuprofen Other (See Comments) 12/21/2016    Medrol [methylprednisolone]  06/10/2014    Proton pump inhibitors  12/26/2017       Review of Systems:  Constitutional: negative for - chills and fever  Eyes:  negative for - visual disturbance and photophobia  HENMT: negative for - headaches and sinus pain  Respiratory: negative for - cough, hemoptysis, and shortness of breath  Cardiovascular: negative for - chest pain and palpitations  Gastrointestinal: negative for - blood in stools, constipation, diarrhea, nausea, and vomiting  Genito-Urinary: negative for - hematuria, urinary frequency, urinary urgency, and urinary retention  Musculoskeletal: negative for - joint pain, joint stiffness, and joint swelling  Hematological and Lymphatic: negative for - bleeding problems, abnormal bruising, and swollen lymph nodes  Endocrine:  negative for - polydipsia and polyphagia  Allergy/Immunology:  negative for - rhinorrhea, sinus congestion, hives  Integument:  negative for - negative for - rash, change in moles, new or changing lesions  Psychological: negative for - anxiety and depression  Neurological: negative for - memory loss, numbness/tingling, and weakness     PHYSICAL EXAMINATION:  Vitals:  /75   Pulse 69   Ht 6' (1.829 m)   Wt (!) 340 lb (154.2 kg)   SpO2 98%   BMI 46.11 kg/m²   General appearance:  Alert, well developed, well nourished, in no distress  HEENT:  normocephalic, atraumatic, sclera appear normal, no nasal abnormalities, no rhinorrhea, Ears appear normal, oral mucous membranes are moist without erythema, trachea midline, thyroid is normal, no lymphadenopathy or neck mass. III (soft palate, base of uvula visible). Cardiovascular:  Regular rate and rhythm without murmer.   No peripheral edema, No cyanosis or clubbing. No carotid bruits. Pulmonary:  Lungs are clear to auscultation. Breathing appears normal, good expansion, normal effort without use of accessory muscles  Musculoskeletal:  Joints are osteoarthritic  Integument:  No rash, erythema, or pallor  Psychiatric:  Mood, affect, and behavior appear normal      NEUROLOGIC EXAMINATION:  Mental Status:  alert, oriented to person, place, and time. Speech:  Clear without dysarthria or dysphonia  Language:  Fluent without aphasia  Cranial Nerves:   II Visual fields are full to confrontation   III,IV, VI Extraocular movements are full   VII Facial movements are symmetrical without weakness   VIII Hearing is intact   XII No tongue atrophy or fasciculations. Normal tongue protrusion. No tongue weakness  Motor:  Normal strength in both upper and lower extremities. Normal muscle tone and bulk. Deep tendon reflexes are intact and symmetrical in both upper and lower extremities. Weems's signs are absent bilaterally. There is no ankle clonus on either side. Sensation:  Sensation is intact to light touch and vibration in all extremities. Coordination:  Rapid alternating movements are normal in both upper and lower extremities. Finger to nose testing is unimpaired bilaterally. Gait:  Normal station and gait. Pertinent Diagnostic Studies:  CPAP download shows that he is 100% compliant with CPAP use at 16cm with residual AHI of 0.2. Assessment:       ICD-10-CM    1. Obstructive sleep apnea  G47.33       2. Osteoarthritis of spine with radiculopathy, cervical region  M47.22       3. Cubital tunnel syndrome, bilateral  G56.23       4. Bilateral carpal tunnel syndrome  G56.03       5. Episodic cluster headache, not intractable  G44.019       He is objectively compliant with and clinically benefiting from CPAP use.   I discussed the diagnosis of obstructive sleep apnea with Tamiko Ramires including the pathophysiology (namely the mechanism of breathing and obstruction of upper airway, interruptions of sleep, hypoxemia, hypercapnia, and results of repetitive sympathetic activation), risks, evaluation, and treatment options. I discussed the risks of driving when drowsy and advised that Lulú Rodriguez not drive when drowsy and avoid sedating medications and respiratory suppressants. Plan:   1. Continue CPAP during sleep. Can reduce the pressure if he needs for air swallowing. I discussed positional remedies as well.   2.         FU in a year     Electronically signed by Minda Parham MD on 2/14/23

## 2023-08-11 ENCOUNTER — TRANSCRIBE ORDERS (OUTPATIENT)
Dept: ADMINISTRATIVE | Facility: HOSPITAL | Age: 53
End: 2023-08-11
Payer: MEDICARE

## 2023-08-11 DIAGNOSIS — R41.82 ALTERED MENTAL STATUS, UNSPECIFIED ALTERED MENTAL STATUS TYPE: Primary | ICD-10-CM

## 2024-02-13 ENCOUNTER — OFFICE VISIT (OUTPATIENT)
Dept: NEUROLOGY | Age: 54
End: 2024-02-13
Payer: MEDICARE

## 2024-02-13 VITALS
SYSTOLIC BLOOD PRESSURE: 133 MMHG | WEIGHT: 315 LBS | HEART RATE: 93 BPM | RESPIRATION RATE: 20 BRPM | BODY MASS INDEX: 42.66 KG/M2 | DIASTOLIC BLOOD PRESSURE: 80 MMHG | HEIGHT: 72 IN

## 2024-02-13 DIAGNOSIS — G56.23 CUBITAL TUNNEL SYNDROME, BILATERAL: ICD-10-CM

## 2024-02-13 DIAGNOSIS — G56.03 BILATERAL CARPAL TUNNEL SYNDROME: ICD-10-CM

## 2024-02-13 DIAGNOSIS — G44.019 EPISODIC CLUSTER HEADACHE, NOT INTRACTABLE: ICD-10-CM

## 2024-02-13 DIAGNOSIS — G47.33 OBSTRUCTIVE SLEEP APNEA: Primary | ICD-10-CM

## 2024-02-13 DIAGNOSIS — M47.22 OSTEOARTHRITIS OF SPINE WITH RADICULOPATHY, CERVICAL REGION: ICD-10-CM

## 2024-02-13 PROCEDURE — 1036F TOBACCO NON-USER: CPT | Performed by: PSYCHIATRY & NEUROLOGY

## 2024-02-13 PROCEDURE — G8427 DOCREV CUR MEDS BY ELIG CLIN: HCPCS | Performed by: PSYCHIATRY & NEUROLOGY

## 2024-02-13 PROCEDURE — G8484 FLU IMMUNIZE NO ADMIN: HCPCS | Performed by: PSYCHIATRY & NEUROLOGY

## 2024-02-13 PROCEDURE — 3017F COLORECTAL CA SCREEN DOC REV: CPT | Performed by: PSYCHIATRY & NEUROLOGY

## 2024-02-13 PROCEDURE — 99213 OFFICE O/P EST LOW 20 MIN: CPT | Performed by: PSYCHIATRY & NEUROLOGY

## 2024-02-13 PROCEDURE — G8417 CALC BMI ABV UP PARAM F/U: HCPCS | Performed by: PSYCHIATRY & NEUROLOGY

## 2024-02-13 RX ORDER — LAMOTRIGINE 25 MG/1
100 TABLET ORAL
COMMUNITY

## 2024-02-13 RX ORDER — VITAMIN B COMPLEX
1 CAPSULE ORAL DAILY
COMMUNITY

## 2024-02-13 RX ORDER — DICLOFENAC POTASSIUM 50 MG/1
50 TABLET, FILM COATED ORAL 2 TIMES DAILY
COMMUNITY
Start: 2022-07-26

## 2024-02-13 RX ORDER — DULOXETIN HYDROCHLORIDE 60 MG/1
CAPSULE, DELAYED RELEASE ORAL
COMMUNITY
Start: 2024-01-31

## 2024-02-13 NOTE — PROGRESS NOTES
Wt (!) 156.5 kg (345 lb)   BMI 46.79 kg/m²   General appearance:  Alert, well developed, well nourished, in no distress  HEENT:  normocephalic, atraumatic, sclera appear normal, no nasal abnormalities, no rhinorrhea, Ears appear normal, oral mucous membranes are moist without erythema, trachea midline, thyroid is normal, no lymphadenopathy or neck mass.  Cardiovascular:  Regular rate and rhythm without murmer.  No peripheral edema, No cyanosis or clubbing.  No carotid bruits.  Pulmonary:  Lungs are clear to auscultation.  Breathing appears normal, good expansion, normal effort without use of accessory muscles  Musculoskeletal:  Joints are osteoarthritic  Integument:  No rash, erythema, or pallor  Psychiatric:  Mood, affect, and behavior appear normal      NEUROLOGIC EXAMINATION:  Mental Status:  alert, oriented to person, place, and time.  Speech:  Clear without dysarthria or dysphonia  Language:  Fluent without aphasia  Cranial Nerves:   II Visual fields are full to confrontation   III,IV, VI Extraocular movements are full   VII Facial movements are symmetrical without weakness   VIII Hearing is intact   IX,X Shoulder shrug and head rotation strength are intact   XII No tongue atrophy or fasciculations.  Normal tongue protrusion.  No tongue weakness  Motor:  Normal strength in both upper and lower extremities.  Normal muscle tone and bulk.  Deep tendon reflexes are intact and symmetrical in both upper and lower extremities.  Weems's signs are absent bilaterally.  There is no ankle clonus on either side.    Sensation:  Sensation is intact to light touch, temperature, and vibration in all extremities.  Coordination:  Rapid alternating movements are normal in both upper and lower extremities.  Finger to nose testing is unimpaired bilaterally.  Gait:  Normal station and gait.      Pertinent Diagnostic Studies:  CPAP download shows that he is 100% compliant with CPAP use at 16cm with residual AHI of 0.1.  His machine

## 2025-02-13 ENCOUNTER — OFFICE VISIT (OUTPATIENT)
Dept: NEUROLOGY | Age: 55
End: 2025-02-13
Payer: MEDICARE

## 2025-02-13 VITALS
HEIGHT: 72 IN | HEART RATE: 92 BPM | RESPIRATION RATE: 16 BRPM | WEIGHT: 315 LBS | BODY MASS INDEX: 42.66 KG/M2 | DIASTOLIC BLOOD PRESSURE: 78 MMHG | SYSTOLIC BLOOD PRESSURE: 128 MMHG

## 2025-02-13 DIAGNOSIS — E66.01 MORBID OBESITY: ICD-10-CM

## 2025-02-13 DIAGNOSIS — G56.23 CUBITAL TUNNEL SYNDROME, BILATERAL: ICD-10-CM

## 2025-02-13 DIAGNOSIS — G47.33 OBSTRUCTIVE SLEEP APNEA: Primary | ICD-10-CM

## 2025-02-13 DIAGNOSIS — M47.22 OSTEOARTHRITIS OF SPINE WITH RADICULOPATHY, CERVICAL REGION: ICD-10-CM

## 2025-02-13 DIAGNOSIS — G44.019 EPISODIC CLUSTER HEADACHE, NOT INTRACTABLE: ICD-10-CM

## 2025-02-13 DIAGNOSIS — G56.03 BILATERAL CARPAL TUNNEL SYNDROME: ICD-10-CM

## 2025-02-13 PROCEDURE — 1036F TOBACCO NON-USER: CPT | Performed by: PSYCHIATRY & NEUROLOGY

## 2025-02-13 PROCEDURE — G8427 DOCREV CUR MEDS BY ELIG CLIN: HCPCS | Performed by: PSYCHIATRY & NEUROLOGY

## 2025-02-13 PROCEDURE — G8417 CALC BMI ABV UP PARAM F/U: HCPCS | Performed by: PSYCHIATRY & NEUROLOGY

## 2025-02-13 PROCEDURE — 3017F COLORECTAL CA SCREEN DOC REV: CPT | Performed by: PSYCHIATRY & NEUROLOGY

## 2025-02-13 PROCEDURE — 99213 OFFICE O/P EST LOW 20 MIN: CPT | Performed by: PSYCHIATRY & NEUROLOGY

## 2025-02-13 RX ORDER — BUDESONIDE AND FORMOTEROL FUMARATE DIHYDRATE 160; 4.5 UG/1; UG/1
AEROSOL RESPIRATORY (INHALATION)
COMMUNITY
Start: 2024-12-31

## 2025-02-13 RX ORDER — TRAZODONE HYDROCHLORIDE 50 MG/1
50 TABLET, FILM COATED ORAL NIGHTLY PRN
COMMUNITY

## 2025-02-13 NOTE — PROGRESS NOTES
Hospitalizations  None    Significant Injuries  None    Habits  Nakul Ramírez reports that he quit smoking about 31 years ago. His smoking use included cigarettes. He started smoking about 35 years ago. He has a 4 pack-year smoking history. He quit smokeless tobacco use about 15 years ago.  His smokeless tobacco use included snuff. He reports current alcohol use. He reports current drug use.    Family History   Problem Relation Age of Onset    Diabetes Mother     High Blood Pressure Mother     Diabetes Father     Cancer Father         leukemia    Diabetes Brother         type 1       Social History  Nakul Ramírez is , lives in [place], [place], and is a student.     Medications:  Current Outpatient Medications   Medication Sig Dispense Refill    SYMBICORT 160-4.5 MCG/ACT AERO INHALE 2 PUFFS BY MOUTH TWICE DAILY AS DIRECTED      diclofenac (VOLTAREN) 50 MG EC tablet Take 1 tablet by mouth 2 times daily      traZODone (DESYREL) 50 MG tablet Take 1 tablet by mouth nightly as needed for Sleep      DULoxetine (CYMBALTA) 60 MG extended release capsule       diclofenac (CATAFLAM) 50 MG tablet Take 1 tablet by mouth 2 times daily      lamoTRIgine (LAMICTAL) 25 MG tablet 4 tablets      Chromium-Cinnamon (CINNAMON PLUS CHROMIUM PO) Take by mouth      ipratropium-albuterol (DUONEB) 0.5-2.5 (3) MG/3ML SOLN nebulizer solution Inhale 3 mLs into the lungs every 4 hours as needed      mometasone (NASONEX) 50 MCG/ACT nasal spray 2 sprays by Nasal route daily      Omega-3 Fatty Acids (FISH OIL) 1000 MG CAPS Take by mouth daily (with breakfast)      lisinopril (PRINIVIL;ZESTRIL) 10 MG tablet Take 1 tablet by mouth daily      tiZANidine (ZANAFLEX) 4 MG tablet Take 1 tablet by mouth nightly      Cholecalciferol (VITAMIN D3) 98716 units CAPS Take 10,000 Units by mouth       methotrexate (RHEUMATREX) 2.5 MG chemo tablet Take by mouth once a week Indications: 8 PO Q Week      folic acid (FOLVITE) 1 MG tablet Take 1 tablet by mouth

## 2025-03-27 ENCOUNTER — OFFICE VISIT (OUTPATIENT)
Dept: NEUROSURGERY | Facility: CLINIC | Age: 55
End: 2025-03-27
Payer: MEDICARE

## 2025-03-27 VITALS — BODY MASS INDEX: 41.75 KG/M2 | WEIGHT: 315 LBS | HEIGHT: 73 IN

## 2025-03-27 DIAGNOSIS — M54.6 BILATERAL THORACIC BACK PAIN, UNSPECIFIED CHRONICITY: Primary | ICD-10-CM

## 2025-03-27 DIAGNOSIS — E66.813 CLASS 3 SEVERE OBESITY DUE TO EXCESS CALORIES WITHOUT SERIOUS COMORBIDITY WITH BODY MASS INDEX (BMI) OF 45.0 TO 49.9 IN ADULT: ICD-10-CM

## 2025-03-27 DIAGNOSIS — E66.01 CLASS 3 SEVERE OBESITY DUE TO EXCESS CALORIES WITHOUT SERIOUS COMORBIDITY WITH BODY MASS INDEX (BMI) OF 45.0 TO 49.9 IN ADULT: ICD-10-CM

## 2025-03-27 PROCEDURE — 1159F MED LIST DOCD IN RCRD: CPT | Performed by: NURSE PRACTITIONER

## 2025-03-27 PROCEDURE — 1160F RVW MEDS BY RX/DR IN RCRD: CPT | Performed by: NURSE PRACTITIONER

## 2025-03-27 PROCEDURE — 99203 OFFICE O/P NEW LOW 30 MIN: CPT | Performed by: NURSE PRACTITIONER

## 2025-03-27 NOTE — PROGRESS NOTES
Chief complaint:   Chief Complaint   Patient presents with    Back Pain     Back has bothered him on and off for years.   This incident started the last part of January pt was showing his daughter how to change the oil on her car, when standing up pain shot into his ribs.   Pain is consistent, sometimes worse than others.        Subjective     HPI: This is a 54-year-old male gentleman who was referred to us by SOFIA Kang for low thoracic back pain.  He is here to be evaluated today.  Patient says that he has had occasional flareups with his back but 2 months ago he was working to change oil in a car and when he went to get up off the ground started having worsening back pain.  Currently says the pain in the back is mostly paraspinal bilaterally in the lower thoracic region.  Is worse with certain positions and better when he changes positions.  He does complain of some pain that radiated to his left lower extremity in a posterior radicular fashion to her that can go all the way to his foot but this pain is intermittent.  Nothing in particular makes it worse or better.  Denies any recent chiropractic care.  He has not had any injections in his back.  He has done a few sessions of therapy and is currently trying to do his own home stretches and exercises.  He is right-hand dominant.  He does take Voltaren.  Denies any tobacco.  He does drink alcohol rarely and does use marijuana occasionally.    Review of Systems   Constitutional:  Positive for activity change.   Musculoskeletal:  Positive for arthralgias, back pain and myalgias.   All other systems reviewed and are negative.       Past Medical History:   Diagnosis Date    Anemia     Arthritis     Asthma     Candida infection     Claustrophobia     Cubital tunnel syndrome     BUE    Fibromyalgia     Gastritis     GERD (gastroesophageal reflux disease)     Gluten intolerance     Gluten intolerance     Hernia     Hypertension     Hypothyroidism     OCD  "(obsessive compulsive disorder)     OCD (obsessive compulsive disorder)     Osteoarthritis     Peptic ulcer     AND DUODENAL    PTSD (post-traumatic stress disorder)     Reflux gastritis     Rheumatoid arthritis     Sleep apnea     uses a c-pap    Spinal stenosis     injections in cervical area approx every 3 months.     Thrush      Past Surgical History:   Procedure Laterality Date    ANTERIOR CERVICAL DISCECTOMY W/ FUSION N/A 3/31/2021    Procedure: CERVICAL DISCECTOMY ANTERIOR INTERBODY  FUSION, ANTERIOR CERVICAL INSTRUMENTATION C4-5,5-6,6-7;  Surgeon: Antony Elmore MD;  Location:  [facility] OR;  Service: Neurosurgery;  Laterality: N/A;    CARPAL TUNNEL RELEASE Left 2010    CARPAL TUNNEL RELEASE Right 2018    Procedure: RIGHT CARPAL TUNNEL RELEASE;  Surgeon: Derrell Pritchard MD;  Location:  [facility] OR;  Service:     CHOLECYSTECTOMY      LIPOMA EXCISION      ROTATOR CUFF REPAIR Right     UMBILICAL HERNIA REPAIR      with mesh    VASECTOMY       Family History   Problem Relation Age of Onset    Diabetes Mother     Hypertension Mother     Diabetes Sister      Social History     Tobacco Use    Smoking status: Former     Current packs/day: 0.00     Average packs/day: 1 pack/day for 6.0 years (6.0 ttl pk-yrs)     Types: Cigarettes     Start date: 1988     Quit date: 1994     Years since quittin.2     Passive exposure: Past    Smokeless tobacco: Former     Types: Snuff     Quit date: 2009   Vaping Use    Vaping status: Never Used   Substance Use Topics    Alcohol use: Yes     Alcohol/week: 3.0 standard drinks of alcohol     Types: 3 Cans of beer per week    Drug use: No     (Not in a hospital admission)    Allergies:  Gabapentin, Methylprednisolone, Adhesive tape, Aspartame, Beta adrenergic blockers, Ibuprofen, Proton pump inhibitors, and Tape    Objective      Vital Signs  Ht 185.4 cm (72.99\")   Wt (!) 156 kg (344 lb 12.8 oz)   BMI 45.50 kg/m²     Physical Exam  Constitutional:       " General: He is awake.      Appearance: Normal appearance. He is well-developed.   HENT:      Head: Normocephalic.   Eyes:      General: Lids are normal.      Extraocular Movements: Extraocular movements intact.      Conjunctiva/sclera: Conjunctivae normal.      Pupils: Pupils are equal, round, and reactive to light.   Pulmonary:      Effort: Pulmonary effort is normal.      Breath sounds: Normal breath sounds.   Musculoskeletal:         General: Normal range of motion.      Cervical back: Normal range of motion.   Skin:     General: Skin is warm.   Neurological:      Mental Status: He is alert and oriented to person, place, and time.      GCS: GCS eye subscore is 4. GCS verbal subscore is 5. GCS motor subscore is 6.      Cranial Nerves: No cranial nerve deficit.      Sensory: No sensory deficit.      Motor: Motor strength is normal.     Deep Tendon Reflexes: Reflexes are normal and symmetric. Reflexes normal.   Psychiatric:         Speech: Speech normal.         Behavior: Behavior normal.         Thought Content: Thought content normal.         Neurological Exam  Mental Status  Awake and alert. Oriented to person, place and time. Oriented to person, place, and time. Speech is normal. Language is fluent with no aphasia. Attention and concentration are normal.    Cranial Nerves  CN I: Sense of smell is normal.  CN II: Right normal visual field. Left normal visual field.  CN III, IV, VI: Extraocular movements intact bilaterally. Normal lids and orbits bilaterally. Pupils equal round and reactive to light bilaterally.  CN V: Facial sensation is normal.  CN VII:  Right: There is no facial weakness.  Left: There is no facial weakness.  CN XI: Shoulder shrug strength is normal.  CN XII: Tongue midline without atrophy or fasciculations.    Motor  Normal muscle bulk throughout. Normal muscle tone. Strength is 5/5 throughout all four extremities.    Sensory  Sensation is intact to light touch, pinprick, vibration and  proprioception in all four extremities.    Reflexes  Deep tendon reflexes are 2+ and symmetric in all four extremities.    Gait  Normal casual, toe, heel and tandem gait.       Imaging review: MRI of the thoracic spine that was done on February 18, 2025 shows a mild scoliotic deformity.  Mild disc degeneration is noted throughout.  No significant area of central canal stenosis or significant foraminal narrowing is noted.        Assessment/Plan: The patient is complaining of pain in his lower thoracic spine that is paraspinal.  At this point I do not see that there is anything from a surgical standpoint that needs to be addressed.  I did offer to send the patient for pain management injections but he wants to try working with the  at this time.  We will plan to see him back on an as-needed basis.  Was told to call us if any further problems or concerns.  Patient is a nonsmoker  The patient's Body mass index is 45.5 kg/m².. BMI is above normal parameters. Recommendations include: educational material and nutrition counseling    Diagnoses and all orders for this visit:    1. Bilateral thoracic back pain, unspecified chronicity (Primary)    2. Class 3 severe obesity due to excess calories without serious comorbidity with body mass index (BMI) of 45.0 to 49.9 in adult          I discussed the patients findings and my recommendations with patient    Pavel Lyn, SOFIA  03/27/25  14:53 CDT

## 2025-03-27 NOTE — PATIENT INSTRUCTIONS
"DASH Eating Plan  DASH stands for Dietary Approaches to Stop Hypertension. The DASH eating plan is a healthy eating plan that has been shown to:  Lower high blood pressure (hypertension).  Reduce your risk for type 2 diabetes, heart disease, and stroke.  Help with weight loss.  What are tips for following this plan?  Reading food labels  Check food labels for the amount of salt (sodium) per serving. Choose foods with less than 5 percent of the Daily Value (DV) of sodium. In general, foods with less than 300 milligrams (mg) of sodium per serving fit into this eating plan.  To find whole grains, look for the word \"whole\" as the first word in the ingredient list.  Shopping  Buy products labeled as \"low-sodium\" or \"no salt added.\"  Buy fresh foods. Avoid canned foods and pre-made or frozen meals.  Cooking  Try not to add salt when you cook. Use salt-free seasonings or herbs instead of table salt or sea salt. Check with your health care provider or pharmacist before using salt substitutes.  Do not molina foods. Cook foods in healthy ways, such as baking, boiling, grilling, roasting, or broiling.  Cook using oils that are good for your heart. These include olive, canola, avocado, soybean, and sunflower oil.  Meal planning    Eat a balanced diet. This should include:  4 or more servings of fruits and 4 or more servings of vegetables each day. Try to fill half of your plate with fruits and vegetables.  6-8 servings of whole grains each day.  6 or less servings of lean meat, poultry, or fish each day. 1 oz is 1 serving. A 3 oz (85 g) serving of meat is about the same size as the palm of your hand. One egg is 1 oz (28 g).  2-3 servings of low-fat dairy each day. One serving is 1 cup (237 mL).  1 serving of nuts, seeds, or beans 5 times each week.  2-3 servings of heart-healthy fats. Healthy fats called omega-3 fatty acids are found in foods such as walnuts, flaxseeds, fortified milks, and eggs. These fats are also found in " cold-water fish, such as sardines, salmon, and mackerel.  Limit how much you eat of:  Canned or prepackaged foods.  Food that is high in trans fat, such as fried foods.  Food that is high in saturated fat, such as fatty meat.  Desserts and other sweets, sugary drinks, and other foods with added sugar.  Full-fat dairy products.  Do not salt foods before eating.  Do not eat more than 4 egg yolks a week.  Try to eat at least 2 vegetarian meals a week.  Eat more home-cooked food and less restaurant, buffet, and fast food.  Lifestyle  When eating at a restaurant, ask if your food can be made with less salt or no salt.  If you drink alcohol:  Limit how much you have to:  0-1 drink a day if you are female.  0-2 drinks a day if you are male.  Know how much alcohol is in your drink. In the [place] one drink is one 12 oz bottle of beer (355 mL), one 5 oz glass of wine (148 mL), or one 1½ oz glass of hard liquor (44 mL).  General information  Avoid eating more than 2,300 mg of salt a day. If you have hypertension, you may need to reduce your sodium intake to 1,500 mg a day.  Work with your provider to stay at a healthy body weight or lose weight. Ask what the best weight range is for you.  On most days of the week, get at least 30 minutes of exercise that causes your heart to beat faster. This may include walking, swimming, or biking.  Work with your provider or dietitian to adjust your eating plan to meet your specific calorie needs.  What foods should I eat?  Fruits  All fresh, dried, or frozen fruit. Canned fruits that are in their natural juice and do not have sugar added to them.  Vegetables  Fresh or frozen vegetables that are raw, steamed, roasted, or grilled. Low-sodium or reduced-sodium tomato and vegetable juice. Low-sodium or reduced-sodium tomato sauce and tomato paste. Low-sodium or reduced-sodium canned vegetables.  Grains  Whole-grain or whole-wheat bread. Whole-grain or whole-wheat pasta. Brown rice. Oatmeal.  Quinoa. Bulgur. Whole-grain and low-sodium cereals. Rosie bread. Low-fat, low-sodium crackers. Whole-wheat flour tortillas.  Meats and other proteins  Skinless chicken or turkey. Ground chicken or turkey. Pork with fat trimmed off. Fish and seafood. Egg whites. Dried beans, peas, or lentils. Unsalted nuts, nut butters, and seeds. Unsalted canned beans. Lean cuts of beef with fat trimmed off. Low-sodium, lean precooked or cured meat, such as sausages or meat loaves.  Dairy  Low-fat (1%) or fat-free (skim) milk. Reduced-fat, low-fat, or fat-free cheeses. Nonfat, low-sodium ricotta or cottage cheese. Low-fat or nonfat yogurt. Low-fat, low-sodium cheese.  Fats and oils  Soft margarine without trans fats. Vegetable oil. Reduced-fat, low-fat, or light mayonnaise and salad dressings (reduced-sodium). Canola, safflower, olive, avocado, soybean, and sunflower oils. Avocado.  Seasonings and condiments  Herbs. Spices. Seasoning mixes without salt.  Other foods  Unsalted popcorn and pretzels. Fat-free sweets.  The items listed above may not be all the foods and drinks you can have. Talk to a dietitian to learn more.  What foods should I avoid?  Fruits  Canned fruit in a light or heavy syrup. Fried fruit. Fruit in cream or butter sauce.  Vegetables  Creamed or fried vegetables. Vegetables in a cheese sauce. Regular canned vegetables that are not marked as low-sodium or reduced-sodium. Regular canned tomato sauce and paste that are not marked as low-sodium or reduced-sodium. Regular tomato and vegetable juices that are not marked as low-sodium or reduced-sodium. Pickles. Olives.  Grains  Baked goods made with fat, such as croissants, muffins, or some breads. Dry pasta or rice meal packs.  Meats and other proteins  Fatty cuts of meat. Ribs. Fried meat. Moran. Bologna, salami, and other precooked or cured meats, such as sausages or meat loaves, that are not lean and low in sodium. Fat from the back of a pig (fatback). Russ.  Salted nuts and seeds. Canned beans with added salt. Canned or smoked fish. Whole eggs or egg yolks. Chicken or turkey with skin.  Dairy  Whole or 2% milk, cream, and half-and-half. Whole or full-fat cream cheese. Whole-fat or sweetened yogurt. Full-fat cheese. Nondairy creamers. Whipped toppings. Processed cheese and cheese spreads.  Fats and oils  Butter. Stick margarine. Lard. Shortening. Ghee. Moran fat. Tropical oils, such as coconut, palm kernel, or palm oil.  Seasonings and condiments  Onion salt, garlic salt, seasoned salt, table salt, and sea salt. Worcestershire sauce. Tartar sauce. Barbecue sauce. Teriyaki sauce. Soy sauce, including reduced-sodium soy sauce. Steak sauce. Canned and packaged gravies. Fish sauce. Oyster sauce. Cocktail sauce. Store-bought horseradish. Ketchup. Mustard. Meat flavorings and tenderizers. Bouillon cubes. Hot sauces. Pre-made or packaged marinades. Pre-made or packaged taco seasonings. Relishes. Regular salad dressings.  Other foods  Salted popcorn and pretzels.  The items listed above may not be all the foods and drinks you should avoid. Talk to a dietitian to learn more.  Where to find more information  National Heart, Lung, and Blood [place] (NHLBI): nhlbi.nih.gov  American Heart Association (AHA): heart.org  Academy of Nutrition and Dietetics: eatright.org  National Kidney Foundation (NKF): kidney.org  This information is not intended to replace advice given to you by your health care provider. Make sure you discuss any questions you have with your health care provider.  Document Revised: 01/04/2024 Document Reviewed: 01/04/2024  Elsevier Patient Education © 2024 [employer]BMI for Adults  Body mass index (BMI) is a number found using a person's weight and height. BMI can help tell how much of a person's weight is made up of fat. BMI does not measure body fat directly. It is used instead of tests that directly measure body fat, which can be difficult and  "expensive.  What are BMI measurements used for?  BMI is useful to:  Find out if your weight puts you at higher risk for medical problems.  Help recommend changes, such as in diet and exercise. This can help you reach a healthy weight. BMI screening can be done again to see if these changes are working.  How is BMI calculated?  Your height and weight are measured. The BMI is found from those numbers. This can be done with [place] or metric measurements. Note that charts and online BMI calculators are available to help you find your BMI quickly and easily without doing these calculations.  To calculate your BMI in [place] measurements:  Measure your weight in pounds (lb).  Multiply the number of pounds by 703.  So, for an adult who weighs 150 lb, multiply that number by 703: 150 x 703, which equals 105,450.  Measure your height in inches. Then multiply that number by itself to get a measurement called \"inches squared.\"  So, for an adult who is 70 inches tall, the \"inches squared\" measurement is 70 inches x 70 inches, which equals 4,900 inches squared.  Divide the total from step 2 (number of lb x 703) by the total from step 3 (inches squared): 105,450 ÷ 4,900 = 21.5. This is your BMI.  To calculate your BMI in metric measurements:    Measure your weight in kilograms (kg).  For this example, the weight is 70 kg.  Measure your height in meters (m). Then multiply that number by itself to get a measurement called \"meters squared.\"  So, for an adult who is 1.75 m tall, the \"meters squared\" measurement is 1.75 m x 1.75 m, which equals 3.1 meters squared.  Divide the number of kilograms (your weight) by the meters squared number. In this example: 70 ÷ 3.1 = 22.6. This is your BMI.  What do the results mean?  BMI charts are used to see if you are underweight, normal weight, overweight, or obese. The following guidelines will be used:  Underweight: BMI less than 18.5.  Normal weight: BMI between 18.5 and 24.9.  Overweight: BMI " between 25 and 29.9.  Obese: BMI of 30 or above.  BMI is a tool and cannot diagnose a condition. Talk with your health care provider about what your BMI means for you. Keep these notes in mind:  Weight includes fat and muscle. Someone with a muscular build, such as an athlete, may have a BMI that is higher than 24.9. In cases like these, BMI is not a correct measure of body fat.  If you have a BMI of 25 or higher, your provider may need to do more testing to find out if excess body fat is the cause.  BMI is measured the same way for males and females. Females usually have more body fat than males of the same height and weight.  Where to find more information  For more information about BMI, including tools to quickly find your BMI, go to:  Centers for Disease Control and Prevention: cdc.gov  American Heart Association: heart.org  National Heart, Lung, and Blood [place]: nhlbi.nih.gov  This information is not intended to replace advice given to you by your health care provider. Make sure you discuss any questions you have with your health care provider.  Document Revised: 09/07/2023 Document Reviewed: 08/31/2023  Elsevier Patient Education © 2024 [employer]

## 2025-04-16 ENCOUNTER — TELEPHONE (OUTPATIENT)
Dept: NEUROLOGY | Age: 55
End: 2025-04-16

## (undated) DEVICE — DISPOSABLE IRRIGATION CASSETTE: Brand: CORE

## (undated) DEVICE — SUT MNCRYL 4/0 PS2 27IN UD MCP426H

## (undated) DEVICE — BLD SCLPL BEAVR MINI STR 2BVL 180D LF

## (undated) DEVICE — ANTIBACTERIAL UNDYED BRAIDED (POLYGLACTIN 910), SYNTHETIC ABSORBABLE SUTURE: Brand: COATED VICRYL

## (undated) DEVICE — COTTON UNDERCAST PADDING,REGULAR FINISH: Brand: WEBRIL

## (undated) DEVICE — BNDG ELAS ECON W/CLIP 3IN 5YD LF STRL

## (undated) DEVICE — DRSNG GZ CURAD XEROFORM NONADHS 5X9IN STRL

## (undated) DEVICE — ELECTRD BLD EZ CLN MOD XLNG 2.75IN

## (undated) DEVICE — LIMB HOLDER, WRIST/ANKLE: Brand: DEROYAL

## (undated) DEVICE — TOTAL TRAY, 16FR 10ML SIL FOLEY, URN: Brand: MEDLINE

## (undated) DEVICE — 3M™ STERI-STRIP™ REINFORCED ADHESIVE SKIN CLOSURES, R1547, 1/2 IN X 4 IN (12 MM X 100 MM), 6 STRIPS/ENVELOPE: Brand: 3M™ STERI-STRIP™

## (undated) DEVICE — CONN FLX BREATHE CIRCT

## (undated) DEVICE — GLV SURG BIOGEL LTX PF 8

## (undated) DEVICE — PK EXTRM 30

## (undated) DEVICE — CLTH CLENS READYCLEANSE PERI CARE PK/5

## (undated) DEVICE — DISPOSABLE TOURNIQUET CUFF SINGLE BLADDER, SINGLE PORT AND QUICK CONNECT CONNECTOR: Brand: COLOR CUFF

## (undated) DEVICE — PROXIMATE RH ROTATING HEAD SKIN STAPLERS (35 WIDE) CONTAINS 35 STAINLESS STEEL STAPLES: Brand: PROXIMATE

## (undated) DEVICE — GLV SURG DERMASSURE GRN LF PF 8.0

## (undated) DEVICE — DRP TABL BK STERILEZ ZFLD

## (undated) DEVICE — EMG TUBE 8229708 NIM TRIVANTAGE 8.0MM ID: Brand: NIM TRIVANTAGE™

## (undated) DEVICE — ADHS LIQ MASTISOL 2/3ML

## (undated) DEVICE — GOWN,SIRUS,NON REINFRCD,LARGE,SET IN SL: Brand: MEDLINE

## (undated) DEVICE — VAGINAL PREP TRAY: Brand: MEDLINE INDUSTRIES, INC.

## (undated) DEVICE — HALTR TRACT HD CERV STD UNIV

## (undated) DEVICE — 4-PORT MANIFOLD: Brand: NEPTUNE 2

## (undated) DEVICE — SPNG GZ WOVN 4X4IN 12PLY 10/BX STRL

## (undated) DEVICE — 5.0MM BARREL STRAIGHT FLUTE

## (undated) DEVICE — NEEDLE, QUINCKE, 18GX3.5": Brand: MEDLINE

## (undated) DEVICE — PK TURNOVER RM ADV

## (undated) DEVICE — GLV SURG TRIUMPH MICRO PF LTX 8 STRL

## (undated) DEVICE — GLV SURG BIOGEL LTX PF 6 1/2

## (undated) DEVICE — GLV SURG TRIUMPH GREEN W/ALOE PF LTX 8 STRL

## (undated) DEVICE — SPNG DISSCT CHRRY 3/8IN STRL PK/5

## (undated) DEVICE — SUT ETHLN 3/0 FS1 30IN 669H

## (undated) DEVICE — CATH IV ANGIO FEP 12G 3IN LTBLU 10PK

## (undated) DEVICE — DRP C/ARMOR

## (undated) DEVICE — BNDG ESMARK 4IN 9FT LF STRL BLU

## (undated) DEVICE — GLV SURG DERMASSURE GRN LF PF 7.0

## (undated) DEVICE — PACK,UNIVERSAL,NO GOWNS: Brand: MEDLINE

## (undated) DEVICE — PK SPINE CERV ANT 30

## (undated) DEVICE — ELECTRD NDL EDGE/INSUL/PFTE.787MM 2.84IN